# Patient Record
Sex: MALE | Race: WHITE | NOT HISPANIC OR LATINO | Employment: OTHER | URBAN - METROPOLITAN AREA
[De-identification: names, ages, dates, MRNs, and addresses within clinical notes are randomized per-mention and may not be internally consistent; named-entity substitution may affect disease eponyms.]

---

## 2017-12-19 ENCOUNTER — OFFICE VISIT (OUTPATIENT)
Dept: FAMILY MEDICINE | Facility: CLINIC | Age: 27
End: 2017-12-19
Payer: OTHER GOVERNMENT

## 2017-12-19 VITALS
HEART RATE: 70 BPM | WEIGHT: 192.8 LBS | SYSTOLIC BLOOD PRESSURE: 134 MMHG | DIASTOLIC BLOOD PRESSURE: 72 MMHG | OXYGEN SATURATION: 95 % | BODY MASS INDEX: 28.56 KG/M2 | TEMPERATURE: 97.7 F | HEIGHT: 69 IN

## 2017-12-19 DIAGNOSIS — R10.32 LLQ ABDOMINAL PAIN: Primary | ICD-10-CM

## 2017-12-19 DIAGNOSIS — R19.7 DIARRHEA, UNSPECIFIED TYPE: ICD-10-CM

## 2017-12-19 LAB
ALBUMIN SERPL-MCNC: 4.2 G/DL (ref 3.4–5)
ALP SERPL-CCNC: 45 U/L (ref 40–150)
ALT SERPL W P-5'-P-CCNC: 39 U/L (ref 0–70)
ANION GAP SERPL CALCULATED.3IONS-SCNC: 5 MMOL/L (ref 3–14)
AST SERPL W P-5'-P-CCNC: 19 U/L (ref 0–45)
BILIRUB SERPL-MCNC: 0.5 MG/DL (ref 0.2–1.3)
BUN SERPL-MCNC: 27 MG/DL (ref 7–30)
CALCIUM SERPL-MCNC: 9.1 MG/DL (ref 8.5–10.1)
CHLORIDE SERPL-SCNC: 101 MMOL/L (ref 94–109)
CO2 SERPL-SCNC: 30 MMOL/L (ref 20–32)
CREAT SERPL-MCNC: 1.28 MG/DL (ref 0.66–1.25)
ERYTHROCYTE [DISTWIDTH] IN BLOOD BY AUTOMATED COUNT: 12.2 % (ref 10–15)
GFR SERPL CREATININE-BSD FRML MDRD: 67 ML/MIN/1.7M2
GLUCOSE SERPL-MCNC: 106 MG/DL (ref 70–99)
HCT VFR BLD AUTO: 43.7 % (ref 40–53)
HGB BLD-MCNC: 15.2 G/DL (ref 13.3–17.7)
MCH RBC QN AUTO: 31.2 PG (ref 26.5–33)
MCHC RBC AUTO-ENTMCNC: 34.8 G/DL (ref 31.5–36.5)
MCV RBC AUTO: 90 FL (ref 78–100)
PLATELET # BLD AUTO: 180 10E9/L (ref 150–450)
POTASSIUM SERPL-SCNC: 3.8 MMOL/L (ref 3.4–5.3)
PROT SERPL-MCNC: 7.5 G/DL (ref 6.8–8.8)
RBC # BLD AUTO: 4.87 10E12/L (ref 4.4–5.9)
SODIUM SERPL-SCNC: 136 MMOL/L (ref 133–144)
TSH SERPL DL<=0.005 MIU/L-ACNC: 0.69 MU/L (ref 0.4–4)
WBC # BLD AUTO: 4.6 10E9/L (ref 4–11)

## 2017-12-19 PROCEDURE — 83516 IMMUNOASSAY NONANTIBODY: CPT | Mod: 59 | Performed by: INTERNAL MEDICINE

## 2017-12-19 PROCEDURE — 36415 COLL VENOUS BLD VENIPUNCTURE: CPT | Performed by: INTERNAL MEDICINE

## 2017-12-19 PROCEDURE — 83516 IMMUNOASSAY NONANTIBODY: CPT | Performed by: INTERNAL MEDICINE

## 2017-12-19 PROCEDURE — 99203 OFFICE O/P NEW LOW 30 MIN: CPT | Performed by: INTERNAL MEDICINE

## 2017-12-19 PROCEDURE — 80053 COMPREHEN METABOLIC PANEL: CPT | Performed by: INTERNAL MEDICINE

## 2017-12-19 PROCEDURE — 85027 COMPLETE CBC AUTOMATED: CPT | Performed by: INTERNAL MEDICINE

## 2017-12-19 PROCEDURE — 84443 ASSAY THYROID STIM HORMONE: CPT | Performed by: INTERNAL MEDICINE

## 2017-12-19 NOTE — PROGRESS NOTES
SUBJECTIVE:   Darius Bennett is a 27 year old male who presents to clinic today for the following health issues:  Chief Complaint   Patient presents with     Abdominal Pain     x 1 year on/off, worsening over past month, w/ diarrhea      ABDOMINAL   PAIN     Onset: x 1 year, getting worse over past month     Description:   Character: Sharp pain comes/goes and when it happens it hurts to breath.  Feels like it has something to do w/ digestion.  Within 10 minutes of eating will have diarrhea   Location: left lower quadrant  Radiation: None    Intensity: 5/10 when happening     Progression of Symptoms:  worsening and waxing and waning    Accompanying Signs & Symptoms:  Fever/Chills?: no   Gas/Bloating: YES  Nausea: no   Vomitting: no   Diarrhea?: YES- 10 - 12 episodes of diarrhea yesterday often w/in minutes of eating   Constipation:no   Dysuria or Hematuria: YES- patient feels like stream was weak yesterday.     History:   Trauma: no   Previous similar pain: YES- about 2 years ago diagnosed w/ IBS   Previous tests done: blood and stool test    Precipitating factors:   Does the pain change with:     Food: YES     BM: YES    Urination: no     Alleviating factors:  none    Therapies Tried and outcome: none    LMP:  not applicable       Carlos Manuel reports that he was diagnosed with IBS a couple of years ago due to daily diarrhea (typically 2-3 times per day).  He had work-up in the  with blood and stool tests that were normal.  He doesn't recall what they tested for exactly.  He did not have any abdominal imaging or colonoscopy at that time.  He gets some cramping pain with diarrhea, but over the past year he has developed LLQ pain that is different and becoming more constant, especially over the past month.  The LLQ pain is sharp and intermittent.  His diarrhea has increased in frequency- had 10-12 episodes yesterday, very liquid.  No blood or mucus in the stool.  He does not have diarrhea at night.  He's not  "had any fevers, chills, or nausea.  He has had a 7 pound weight gain in the past couple of months because he has been working out.  He has occasional sternal chest pain for about 30 minutes, non radiating.  Some episodes of pounding heart, but not fast.  No SOB.  No nausea.  Had some frequent urination recently.    He has not had an recent travel, new animal exposure, untreated water consumption, recent antibiotics, or exposure to healthcare environments.      Problem list and histories reviewed & adjusted, as indicated.  Additional history: as documented    Patient Active Problem List   Diagnosis     Cluster headaches     IBS (irritable bowel syndrome)     Past Surgical History:   Procedure Laterality Date     Compartment release Bilateral 2007    He reports compartment syndrome in both lower legs for which he had surgery       Social History   Substance Use Topics     Smoking status: Never Smoker     Smokeless tobacco: Former User     Alcohol use Yes     Family History   Problem Relation Age of Onset     DIABETES Paternal Grandmother      Kidney Cancer Paternal Grandfather          Current Outpatient Prescriptions   Medication Sig Dispense Refill     Multiple Vitamins-Minerals (MULTIVITAMIN ADULT PO)        Omega-3 Fatty Acids (FISH OIL OMEGA-3 PO)        No Known Allergies      Reviewed and updated as needed this visit by clinical staffTobacco  Allergies  Med Hx  Surg Hx  Fam Hx  Soc Hx      Reviewed and updated as needed this visit by Provider         ROS:  Constitutional, cardiovascular, pulmonary, gi and gu systems are negative, except as otherwise noted.      OBJECTIVE:   /72 (BP Location: Right arm, Patient Position: Chair, Cuff Size: Adult Regular)  Pulse 70  Temp 97.7  F (36.5  C) (Tympanic)  Ht 5' 8.5\" (1.74 m)  Wt 192 lb 12.8 oz (87.5 kg)  SpO2 95%  BMI 28.89 kg/m2  Body mass index is 28.89 kg/(m^2).    GENERAL: healthy, alert and no distress  RESP: lungs clear to auscultation - no " rales, rhonchi or wheezes  CV: regular rate and rhythm, normal S1 S2, no S3 or S4, no murmur, click or rub  ABDOMEN: soft, mild LLQ tenderness, no rebound or guarding, no hepatosplenomegaly, no masses and bowel sounds normal      Diagnostic Test Results:  Results for orders placed or performed in visit on 12/19/17 (from the past 24 hour(s))   CBC with platelets   Result Value Ref Range    WBC 4.6 4.0 - 11.0 10e9/L    RBC Count 4.87 4.4 - 5.9 10e12/L    Hemoglobin 15.2 13.3 - 17.7 g/dL    Hematocrit 43.7 40.0 - 53.0 %    MCV 90 78 - 100 fl    MCH 31.2 26.5 - 33.0 pg    MCHC 34.8 31.5 - 36.5 g/dL    RDW 12.2 10.0 - 15.0 %    Platelet Count 180 150 - 450 10e9/L   Comprehensive metabolic panel   Result Value Ref Range    Sodium 136 133 - 144 mmol/L    Potassium 3.8 3.4 - 5.3 mmol/L    Chloride 101 94 - 109 mmol/L    Carbon Dioxide 30 20 - 32 mmol/L    Anion Gap 5 3 - 14 mmol/L    Glucose 106 (H) 70 - 99 mg/dL    Urea Nitrogen 27 7 - 30 mg/dL    Creatinine 1.28 (H) 0.66 - 1.25 mg/dL    GFR Estimate 67 >60 mL/min/1.7m2    GFR Estimate If Black 81 >60 mL/min/1.7m2    Calcium 9.1 8.5 - 10.1 mg/dL    Bilirubin Total 0.5 0.2 - 1.3 mg/dL    Albumin 4.2 3.4 - 5.0 g/dL    Protein Total 7.5 6.8 - 8.8 g/dL    Alkaline Phosphatase 45 40 - 150 U/L    ALT 39 0 - 70 U/L    AST 19 0 - 45 U/L   TSH with free T4 reflex   Result Value Ref Range    TSH 0.69 0.40 - 4.00 mU/L       ASSESSMENT/PLAN:       1. LLQ abdominal pain  2. Diarrhea, unspecified type    LLQ pain is a different symptom than his typical IBS symptoms and his BM frequency has significantly increased, so this does warrant further investigation.  Labs fairly unremarkable thus far- creatinine is slightly high (possibly related to high muscle mass?) but GFR is okay.  Will get some stool studies as well.  He does track his diet, advised him to watch to see if symptoms correlate with any particular food.  If remainder of labs are normal, consider abdominal imaging and/or  colonoscopy for further evaluation.     - CBC with platelets  - Comprehensive metabolic panel  - TSH with free T4 reflex  - Tissue transglutaminase anne IgA and IgG  - Fecal Lactoferrin; Future  - Enteric Bacteria and Virus Panel by SUYAPA Stool; Future  - Clostridium difficile toxin B PCR; Future  - Ova and Parasite Exam Routine; Future  - Ova and Parasite Exam Routine; Future    MounaJessie Law MD  [facility]

## 2017-12-19 NOTE — LETTER
December 20, 2017      Darius Bennett  [address] [address] [address]        Dear ,    We are writing to inform you of your test results.  Please let Carlos Manuel know that his blood work looks pretty good.  Normal cell counts, thyroid, Celiac test, electrolytes, liver tests.  Blood sugar was a bit high, likely because he was not fasting, and his creatinine is a bit high- likely due to high muscle mass.  He should submit the stool sample for further evaluation.  Thanks.    Also, it appears the phone number we have on file to reach you is the wrong number. Please call the clinic at [phone] to update your contact information. Thank You     Resulted Orders   CBC with platelets   Result Value Ref Range    WBC 4.6 4.0 - 11.0 10e9/L    RBC Count 4.87 4.4 - 5.9 10e12/L    Hemoglobin 15.2 13.3 - 17.7 g/dL    Hematocrit 43.7 40.0 - 53.0 %    MCV 90 78 - 100 fl    MCH 31.2 26.5 - 33.0 pg    MCHC 34.8 31.5 - 36.5 g/dL    RDW 12.2 10.0 - 15.0 %    Platelet Count 180 150 - 450 10e9/L   Comprehensive metabolic panel   Result Value Ref Range    Sodium 136 133 - 144 mmol/L    Potassium 3.8 3.4 - 5.3 mmol/L    Chloride 101 94 - 109 mmol/L    Carbon Dioxide 30 20 - 32 mmol/L    Anion Gap 5 3 - 14 mmol/L    Glucose 106 (H) 70 - 99 mg/dL    Urea Nitrogen 27 7 - 30 mg/dL    Creatinine 1.28 (H) 0.66 - 1.25 mg/dL    GFR Estimate 67 >60 mL/min/1.7m2      Comment:      Non  GFR Calc    GFR Estimate If Black 81 >60 mL/min/1.7m2      Comment:       GFR Calc    Calcium 9.1 8.5 - 10.1 mg/dL    Bilirubin Total 0.5 0.2 - 1.3 mg/dL    Albumin 4.2 3.4 - 5.0 g/dL    Protein Total 7.5 6.8 - 8.8 g/dL    Alkaline Phosphatase 45 40 - 150 U/L    ALT 39 0 - 70 U/L    AST 19 0 - 45 U/L   TSH with free T4 reflex   Result Value Ref Range    TSH 0.69 0.40 - 4.00 mU/L   Tissue transglutaminase anne IgA and IgG   Result Value Ref Range    Tissue Transglutaminase Antibody IgA 1 <7 U/mL      Comment:       Negative  The tTG-IgA assay has limited utility for patients with decreased levels of   IgA. Screening for celiac disease should include IgA testing to rule out   selective IgA deficiency and to guide selection and interpretation of   serological testing. tTG-IgG testing may be positive in celiac disease   patients with IgA deficiency.      Tissue Transglutaminase Snehal IgG <1 <7 U/mL      Comment:      Negative       If you have any questions or concerns, please call the clinic at the number listed above.       Sincerely,        Jayant Law MD/angélica

## 2017-12-19 NOTE — MR AVS SNAPSHOT
"              After Visit Summary   12/19/2017    Darius Bennett    MRN: [id number]           Patient Information     Date Of Birth          1990        Visit Information        Provider Department      12/19/2017 8:20 AM Jayant Law MD [facility]        Today's Diagnoses     LLQ abdominal pain    -  1    Diarrhea, unspecified type           Follow-ups after your visit        Future tests that were ordered for you today     Open Future Orders        Priority Expected Expires Ordered    Fecal Lactoferrin Routine  12/19/2018 12/19/2017    Enteric Bacteria and Virus Panel by SUYAPA Stool Routine  12/19/2018 12/19/2017    Clostridium difficile toxin B PCR Routine  12/19/2018 12/19/2017    Ova and Parasite Exam Routine Routine  12/19/2018 12/19/2017    Ova and Parasite Exam Routine Routine  12/19/2018 12/19/2017            Who to contact     If you have questions or need follow up information about today's clinic visit or your schedule please contact [facility] directly at [phone].  Normal or non-critical lab and imaging results will be communicated to you by Cloudnexahart, letter or phone within 4 business days after the clinic has received the results. If you do not hear from us within 7 days, please contact the clinic through Jooobz!t or phone. If you have a critical or abnormal lab result, we will notify you by phone as soon as possible.  Submit refill requests through Kaola100 or call your pharmacy and they will forward the refill request to us. Please allow 3 business days for your refill to be completed.          Additional Information About Your Visit        Cloudnexahart Information     Kaola100 lets you send messages to your doctor, view your test results, renew your prescriptions, schedule appointments and more. To sign up, go to [email or web address] . Click on \"Log in\" on the left side of the screen, which will take you to the Welcome page. Then click on \"Sign up Now\" on the " "right side of the page.     You will be asked to enter the access code listed below, as well as some personal information. Please follow the directions to create your username and password.     Your access code is: [email or web address]  Expires: 3/19/2018  9:06 AM     Your access code will  in 90 days. If you need help or a new code, please call your [place] clinic or [phone].        Care EveryWhere ID     This is your Care EveryWhere ID. This could be used by other organizations to access your [place] medical records  [id number]        Your Vitals Were     Pulse Temperature Height Pulse Oximetry BMI (Body Mass Index)       70 97.7  F (36.5  C) (Tympanic) 5' 8.5\" (1.74 m) 95% 28.89 kg/m2        Blood Pressure from Last 3 Encounters:   17 134/72    Weight from Last 3 Encounters:   17 192 lb 12.8 oz (87.5 kg)              We Performed the Following     CBC with platelets     Comprehensive metabolic panel     Tissue transglutaminase anne IgA and IgG     TSH with free T4 reflex          Today's Medication Changes          These changes are accurate as of: 17  9:06 AM.  If you have any questions, ask your nurse or doctor.               Stop taking these medicines if you haven't already. Please contact your care team if you have questions.     CLARINEX 5 MG tablet   Generic drug:  desloratadine   Stopped by:  Jayant Law MD                    Primary Care Provider Office Phone # Fax #    [facility] [phone] [address] [address]        Equal Access to Services     [facility]: Hadii manish silverio haduarorao Sokay, waaxda luqadaha, qaybta kaalmada josh lopez idiin hayaan adeeg kharash la'aan . So [facility] [phone].    ATENCIÓN: Si habla español, tiene a scherer disposición servicios gratuitos de asistencia lingüística. Llame al [phone].    We comply with applicable federal civil rights laws and [place] laws. We do not discriminate on the " basis of race, color, national origin, age, disability, sex, sexual orientation, or gender identity.            Thank you!     Thank you for choosing [facility]  for your care. Our goal is always to provide you with excellent care. Hearing back from our patients is one way we can continue to improve our services. Please take a few minutes to complete the written survey that you may receive in the mail after your visit with us. Thank you!             Your Updated Medication List - Protect others around you: Learn how to safely use, store and throw away your medicines at www.disposemymeds.org.          This list is accurate as of: 12/19/17  9:06 AM.  Always use your most recent med list.                   Brand Name Dispense Instructions for use Diagnosis    FISH OIL OMEGA-3 PO           MULTIVITAMIN ADULT PO

## 2017-12-19 NOTE — NURSING NOTE
"Chief Complaint   Patient presents with     Abdominal Pain     x 1 year on/off, worsening over past month, w/ diarrhea        Initial /72 (BP Location: Right arm, Patient Position: Chair, Cuff Size: Adult Regular)  Pulse 70  Temp 97.7  F (36.5  C) (Tympanic)  Ht 5' 8.5\" (1.74 m)  Wt 192 lb 12.8 oz (87.5 kg)  SpO2 95%  BMI 28.89 kg/m2 Estimated body mass index is 28.89 kg/(m^2) as calculated from the following:    Height as of this encounter: 5' 8.5\" (1.74 m).    Weight as of this encounter: 192 lb 12.8 oz (87.5 kg).  Medication Reconciliation: complete   Krysta ELLSWORTH CMA ([facility])    "

## 2017-12-20 LAB
TTG IGA SER-ACNC: 1 U/ML
TTG IGG SER-ACNC: <1 U/ML

## 2017-12-21 ENCOUNTER — ALLIED HEALTH/NURSE VISIT (OUTPATIENT)
Dept: FAMILY MEDICINE | Facility: CLINIC | Age: 27
End: 2017-12-21
Payer: OTHER GOVERNMENT

## 2017-12-21 ENCOUNTER — TELEPHONE (OUTPATIENT)
Dept: FAMILY MEDICINE | Facility: CLINIC | Age: 27
End: 2017-12-21

## 2017-12-21 DIAGNOSIS — R10.32 LLQ ABDOMINAL PAIN: ICD-10-CM

## 2017-12-21 DIAGNOSIS — R10.32 LLQ ABDOMINAL PAIN: Primary | ICD-10-CM

## 2017-12-21 DIAGNOSIS — R19.7 DIARRHEA, UNSPECIFIED TYPE: ICD-10-CM

## 2017-12-21 LAB
C COLI+JEJUNI+LARI FUSA STL QL NAA+PROBE: NOT DETECTED
C DIFF TOX B STL QL: ABNORMAL
EC STX1 GENE STL QL NAA+PROBE: NOT DETECTED
EC STX2 GENE STL QL NAA+PROBE: NOT DETECTED
ENTERIC PATHOGEN COMMENT: NORMAL
NOROV GI+II ORF1-ORF2 JNC STL QL NAA+PR: NOT DETECTED
RVA NSP5 STL QL NAA+PROBE: NOT DETECTED
SALMONELLA SP RPOD STL QL NAA+PROBE: NOT DETECTED
SHIGELLA SP+EIEC IPAH STL QL NAA+PROBE: NOT DETECTED
SPECIMEN SOURCE: ABNORMAL
V CHOL+PARA RFBL+TRKH+TNAA STL QL NAA+PR: NOT DETECTED
Y ENTERO RECN STL QL NAA+PROBE: NOT DETECTED

## 2017-12-21 PROCEDURE — 87177 OVA AND PARASITES SMEARS: CPT | Performed by: INTERNAL MEDICINE

## 2017-12-21 PROCEDURE — 99207 ZZC NO CHARGE NURSE ONLY: CPT

## 2017-12-21 PROCEDURE — 87209 SMEAR COMPLEX STAIN: CPT | Performed by: INTERNAL MEDICINE

## 2017-12-21 PROCEDURE — 87506 IADNA-DNA/RNA PROBE TQ 6-11: CPT | Performed by: INTERNAL MEDICINE

## 2017-12-21 NOTE — TELEPHONE ENCOUNTER
Pt informed of Karlee Tello' instructions below.    He updates that he is following a bland diet and feels he is improving overall.  He is concerned about the amount of pain that he was experiencing and what to do if it recurs.     Informed pt that he will need to present to the ED if his pain worsens or he has worsening symptoms again.  Instructed to continue to follow bland diet of easy-to-digest foods until all his symptoms have resolved.  Informed to avoid alcohol, caffeine, chocolate, fatty foods, spicy foods and other hard to digest foods.    Directed pt to follow up with Dr Law when he returns from his trip after the new year.      Routed this message to Dr Law for further instructions upon her return to clinic next week.    Post-poned this message until then.    Carlota Waldron RN

## 2017-12-21 NOTE — LETTER
December 22, 2017      Darius Bennett  [address] [address] [address]        Dear ,    We are writing to inform you of your test results.    Stool studies negative for infection    Resulted Orders   Enteric Bacteria and Virus Panel by SUYAPA Stool   Result Value Ref Range    Campylobacter group by SUYAPA Not Detected NDET^Not Detected    Salmonella species by SUYAPA Not Detected NDET^Not Detected    Shigella species by SUYAPA Not Detected NDET^Not Detected    Vibrio group by SUYAPA Not Detected NDET^Not Detected    Rotavirus A by SUYAPA Not Detected NDET^Not Detected    Shiga toxin 1 gene by SUYAPA Not Detected NDET^Not Detected    Shiga toxin 2 gene by SUYAPA Not Detected NDET^Not Detected    Norovirus I and II by SUYAPA Not Detected NDET^Not Detected    Yersinia enterocolitica by SUYAPA Not Detected NDET^Not Detected    Enteric pathogen comment       Testing performed by multiplexed, qualitative PCR using the Nanosphere ForeUpigene Enteric   Pathogens Nucleic Acid Test. Results should not be used as the sole basis for diagnosis,   treatment, or other patient management decisions.        Comment:      Positive results do not rule out co-infection with other organisms that are   not detected by this test, and may not be the sole or definitive cause of   patient illness.   Negative results in the setting of clinical illness compatible with   gastroenteritis may be due to infection by pathogens that are not detected by   this test or non-infectious causes such as ulcerative colitis, irritable bowel   syndrome, or Crohn's disease.   Note: Shiga toxin producing E. coli (STEC) typically harbor one or both genes   that encode for Shiga toxins 1 and 2.         If you have any questions or concerns, please call the clinic at the number listed above.       Sincerely,        Charlene Sawyer, CNP

## 2017-12-21 NOTE — TELEPHONE ENCOUNTER
The pt has stopped into a RN appt to discuss symptoms. The pt was seen in clinic visit on 12/19/17 for c/o LLQ abd pain. I have reviewed lab results with him. The pt reports that he dropped off stool samples today. He is in a RN visit to report continued LLQ pain that he rates 5/10. He reports eating a bland diet, and states that the diarrhea has gotten better, but the abd pain as remained the same. He states that it is very localized and feels different then his previous experience with IBS. He is concerned about this. The pt reports that he is leaving for [place] tomorrow morning.     I will route this message to Dr Law to review and advise.   Meron Jesus RN

## 2017-12-21 NOTE — MR AVS SNAPSHOT
"              After Visit Summary   2017    Darius Bennett    MRN: [id number]           Patient Information     Date Of Birth          1990        Visit Information        Provider Department      2017 10:45 AM GERSON CERNA/JOHNY MARTINS [facility]        Today's Diagnoses     LLQ abdominal pain    -  1       Follow-ups after your visit        Who to contact     If you have questions or need follow up information about today's clinic visit or your schedule please contact [facility] directly at [phone].  Normal or non-critical lab and imaging results will be communicated to you by Gridle.inhart, letter or phone within 4 business days after the clinic has received the results. If you do not hear from us within 7 days, please contact the clinic through Gridle.inhart or phone. If you have a critical or abnormal lab result, we will notify you by phone as soon as possible.  Submit refill requests through AMDL or call your pharmacy and they will forward the refill request to us. Please allow 3 business days for your refill to be completed.          Additional Information About Your Visit        MyChart Information     AMDL lets you send messages to your doctor, view your test results, renew your prescriptions, schedule appointments and more. To sign up, go to [email or web address] . Click on \"Log in\" on the left side of the screen, which will take you to the Welcome page. Then click on \"Sign up Now\" on the right side of the page.     You will be asked to enter the access code listed below, as well as some personal information. Please follow the directions to create your username and password.     Your access code is: [email or web address]  Expires: 3/19/2018  9:06 AM     Your access code will  in 90 days. If you need help or a new code, please call your [facility] or [phone].        Care EveryWhere ID     This is your Care EveryWhere ID. This could be used by other " organizations to access your [place] medical records  [id number]         Blood Pressure from Last 3 Encounters:   12/19/17 134/72    Weight from Last 3 Encounters:   12/19/17 192 lb 12.8 oz (87.5 kg)              Today, you had the following     No orders found for display       Primary Care Provider Office Phone # Fax #    [facility] [phone] [address] [address]        Equal Access to Services     SHAYNE ZAMORA : Hadii aad ku hadasho Soomaali, waaxda luqadaha, qaybta kaalmada adeegyada, waxay idiin hayaan adeeg kharash la'christophern amber. So [facility] [phone].    ATENCIÓN: Si habla español, tiene a scherer disposición servicios gratuitos de asistencia lingüística. Ll[facility] [phone].    We comply with applicable federal civil rights laws and [place] laws. We do not discriminate on the basis of race, color, national origin, age, disability, sex, sexual orientation, or gender identity.            Thank you!     Thank you for choosing [facility]  for your care. Our goal is always to provide you with excellent care. Hearing back from our patients is one way we can continue to improve our services. Please take a few minutes to complete the written survey that you may receive in the mail after your visit with us. Thank you!             Your Updated Medication List - Protect others around you: Learn how to safely use, store and throw away your medicines at www.disposemymeds.org.          This list is accurate as of: 12/21/17 11:43 AM.  Always use your most recent med list.                   Brand Name Dispense Instructions for use Diagnosis    FISH OIL OMEGA-3 PO           MULTIVITAMIN ADULT PO

## 2017-12-21 NOTE — TELEPHONE ENCOUNTER
I would speak to Dr. Dao... If in clinic today.  IF not patient needs to be seen for recheck and ? Imaging.  LOAN JenkinsP

## 2017-12-21 NOTE — TELEPHONE ENCOUNTER
Reason for Call:  Other fyi    Detailed comments: lab is calling to say they cannot process the stool sample for this patient as it is in a solid form.    Phone Number labcan be reached at: Other phone number:  [phone]    Best Time: any    Can we leave a detailed message on this number? YES    Call taken on 12/21/2017 at 1:50 PM by Isi Cates

## 2017-12-21 NOTE — NURSING NOTE
The pt has stopped into a RN appt to discuss symptoms. The pt was seen in clinic visit on 12/19/17 for c/o LLQ abd pain. I have reviewed labs with him. The pt reports that he dropped off stool samples today. He is in a RN visit to report continued LLQ pain that he rates 5/10. He reports eating a bland diet, and states that the diarrhea has gotten better, but the abd pain as remained the same. He states that it is very localized and feels different then his previous experience with IBS. He is concerned about this. The pt reports that he is leaving for [place] tomorrow morning.     I will route this message to Dr Law to review and advise.   Meron Jesus RN

## 2017-12-22 LAB
O+P STL MICRO: NORMAL
O+P STL MICRO: NORMAL
SPECIMEN SOURCE: NORMAL

## 2017-12-22 NOTE — TELEPHONE ENCOUNTER
Pt informed that his stool specimen was too formed to be processed.  Pt says that his diarrhea is improving.  No further specimen re-ordered at this time due to formed stools.  Carlota Waldron RN

## 2017-12-27 NOTE — TELEPHONE ENCOUNTER
The pt has returned a call to the clinic. He states that he still has the pain as described below. I have provided him with the number to schedule the CT scan. We can just leave him a message when the order is placed.   I will route this back to Dr Law. The pt would like to move forward with the CT scan. Please order.   Thank you,    Meron Jesus RN

## 2017-12-27 NOTE — TELEPHONE ENCOUNTER
If not improved by the time he returns from his trip, recommend proceeding with abdominal CT scan.  I will place order if he is not better.  Thanks.    Jayant Law MD

## 2018-01-04 RX ORDER — IOPAMIDOL 755 MG/ML
94 INJECTION, SOLUTION INTRAVASCULAR ONCE
Status: COMPLETED | OUTPATIENT
Start: 2018-01-05 | End: 2018-01-05

## 2018-01-05 ENCOUNTER — HOSPITAL ENCOUNTER (OUTPATIENT)
Dept: CT IMAGING | Facility: CLINIC | Age: 28
Discharge: HOME | End: 2018-01-05
Attending: INTERNAL MEDICINE | Admitting: INTERNAL MEDICINE
Payer: OTHER GOVERNMENT

## 2018-01-05 DIAGNOSIS — R10.32 LLQ ABDOMINAL PAIN: ICD-10-CM

## 2018-01-05 DIAGNOSIS — R19.7 DIARRHEA, UNSPECIFIED TYPE: ICD-10-CM

## 2018-01-05 DIAGNOSIS — R10.32 LLQ ABDOMINAL PAIN: Primary | ICD-10-CM

## 2018-01-05 PROCEDURE — 25000125 ZZHC RX 250: Performed by: RADIOLOGY

## 2018-01-05 PROCEDURE — 74177 CT ABD & PELVIS W/CONTRAST: CPT

## 2018-01-05 PROCEDURE — 25000128 H RX IP 250 OP 636: Performed by: RADIOLOGY

## 2018-01-05 RX ADMIN — IOPAMIDOL 94 ML: 755 INJECTION, SOLUTION INTRAVENOUS at 10:24

## 2018-01-05 RX ADMIN — SODIUM CHLORIDE 64 ML: 9 INJECTION, SOLUTION INTRAVENOUS at 10:24

## 2018-03-05 ENCOUNTER — OFFICE VISIT (OUTPATIENT)
Dept: FAMILY MEDICINE | Facility: CLINIC | Age: 28
End: 2018-03-05
Payer: OTHER GOVERNMENT

## 2018-03-05 VITALS
DIASTOLIC BLOOD PRESSURE: 74 MMHG | WEIGHT: 198 LBS | HEART RATE: 65 BPM | BODY MASS INDEX: 29.67 KG/M2 | SYSTOLIC BLOOD PRESSURE: 130 MMHG

## 2018-03-05 DIAGNOSIS — G89.29 CHRONIC PAIN OF LEFT KNEE: Primary | ICD-10-CM

## 2018-03-05 DIAGNOSIS — M25.562 CHRONIC PAIN OF LEFT KNEE: Primary | ICD-10-CM

## 2018-03-05 PROCEDURE — 99213 OFFICE O/P EST LOW 20 MIN: CPT | Performed by: NURSE PRACTITIONER

## 2018-03-05 NOTE — MR AVS SNAPSHOT
After Visit Summary   3/5/2018    Darius Bennett    MRN: [id number]           Patient Information     Date Of Birth          1990        Visit Information        Provider Department      3/5/2018 11:20 AM Nelly Aguirre APRN CNP [facility]        Today's Diagnoses     Chronic pain of left knee    -  1      Care Instructions    Knee MRI  Ibuprofen, Naproxen as needed  Try soft knee brace, or ACE wraps    Schedule with ortho               Follow-ups after your visit        Additional Services     ORTHO  REFERRAL       [facility] is referring you to the Orthopedic  Services at [facility].       The  Representative will assist you in the coordination of your Orthopedic and Musculoskeletal Care as prescribed by your physician.    The  Representative will call you within 1 business day to help schedule your appointment, or you may contact the  Representative at:    All areas ~ [phone]     Type of Referral : Non Surgical       Timeframe requested: 3 - 5 days    Coverage of these services is subject to the terms and limitations of your health insurance plan.  Please call member services at your health plan with any benefit or coverage questions.      If X-rays, CT or MRI's have been performed, please contact the facility where they were done to arrange for , prior to your scheduled appointment.  Please bring this referral request to your appointment and present it to your specialist.                  Follow-up notes from your care team     Return if symptoms worsen or fail to improve.      Your next 10 appointments already scheduled     Mar 09, 2018  8:00 AM CST   (Arrive by 7:45 AM)   MR KNEE LEFT W/O CONTRAST with WYMR2   [facility] MRI ([facility])    [address] [address]   [phone]           Take your medicines as usual, unless  your doctor tells you not to. Bring a list of your current medicines to your exam (including vitamins, minerals and over-the-counter drugs). Also bring the results of similar scans you may have had.  Please remove any body piercings and hair extensions before you arrive.  Follow your doctor s orders. If you do not, we may have to postpone your exam.  You may or may not receive IV contrast for this exam pending the discretion of the Radiologist.  You do not need to do anything special to prepare.  The MRI machine uses a strong magnet. Please wear clothes without metal (snaps, zippers). A sweatsuit works well, or we may give you a hospital gown.   **IMPORTANT** THE INSTRUCTIONS BELOW ARE ONLY FOR THOSE PATIENTS WHO HAVE BEEN PRESCRIBED SEDATION OR GENERAL ANESTHESIA DURING THEIR MRI PROCEDURE:  IF YOUR DOCTOR PRESCRIBED ORAL SEDATION (take medicine to help you relax during your exam):   You must get the medicine from your doctor (oral medication) before you arrive. Bring the medicine to the exam. Do not take it at home. You ll be told when to take it upon arriving for your exam.   Arrive one hour early. Bring someone who can take you home after the test. Your medicine will make you sleepy. After the exam, you may not drive, take a bus or take a taxi by yourself.  IF YOUR DOCTOR PRESCRIBED IV SEDATION:   Arrive one hour early. Bring someone who can take you home after the test. Your medicine will make you sleepy. After the exam, you may not drive, take a bus or take a taxi by yourself.   No eating 6 hours before your exam. You may have clear liquids up until 4 hours before your exam. (Clear liquids include water, clear tea, black coffee and fruit juice without pulp.)  IF YOUR DOCTOR PRESCRIBED ANESTHESIA (be asleep for your exam):   Arrive 1 1/2 hours early. Bring someone who can take you home after the test. You may not drive, take a bus or take a taxi by yourself.   No eating 8 hours before your exam. You may have  "clear liquids up until 4 hours before your exam. (Clear liquids include water, clear tea, black coffee and fruit juice without pulp.)   You will spend four to five hours in the recovery room.  Please call the Imaging Department at your exam site with any questions.            Mar 15, 2018  3:00 PM CDT   New Visit with Dutch Cheema DO   [facility] ([facility])    [address]  [address]  [facility] [place] [facility] [place] [address] [phone]              Who to contact     If you have questions or need follow up information about today's clinic visit or your schedule please contact [facility] directly at [phone].  Normal or non-critical lab and imaging results will be communicated to you by [employer], letter or phone within 4 business days after the clinic has received the results. If you do not hear from us within 7 days, please contact the clinic through [employer] or phone. If you have a critical or abnormal lab result, we will notify you by phone as soon as possible.  Submit refill requests through [employer] or call your pharmacy and they will forward the refill request to us. Please allow 3 business days for your refill to be completed.          Additional Information About Your Visit        [employer] Information     [employer] lets you send messages to your doctor, view your test results, renew your prescriptions, schedule appointments and more. To sign up, go to [email or web address] . Click on \"Log in\" on the left side of the screen, which will take you to the Welcome page. Then click on \"Sign up Now\" on the right side of the page.     You will be asked to enter the access code listed below, as well as some personal information. Please follow the directions to create your username and password.     Your access code is: [email or web address]  Expires: 3/19/2018  9:06 AM     Your access code will  in 90 days. If you need help or a new code, please call your " [facility] or [phone].        Care EveryWhere ID     This is your Care EveryWhere ID. This could be used by other organizations to access your [facility] medical records  [id number]        Your Vitals Were     Pulse BMI (Body Mass Index)                65 29.67 kg/m2           Blood Pressure from Last 3 Encounters:   03/05/18 130/74   12/19/17 134/72    Weight from Last 3 Encounters:   03/05/18 198 lb (89.8 kg)   12/19/17 192 lb 12.8 oz (87.5 kg)              We Performed the Following     ORTHO  REFERRAL        Primary Care Provider Office Phone # Fax #    [facility] [phone] [address] [address]        Equal Access to Services     SHAYNE ZAMORA : Hadii manish yano Sokay, waaxda luqadaha, qaybta kaalmada adeegyada, josh del rosario . So [facility] [phone].    ATENCIÓN: Si habla español, tiene a scherer disposición servicios gratuitos de asistencia lingüística. Llame al [phone].    We comply with applicable federal civil rights laws and [place] laws. We do not discriminate on the basis of race, color, national origin, age, disability, sex, sexual orientation, or gender identity.            Thank you!     Thank you for choosing [facility]  for your care. Our goal is always to provide you with excellent care. Hearing back from our patients is one way we can continue to improve our services. Please take a few minutes to complete the written survey that you may receive in the mail after your visit with us. Thank you!             Your Updated Medication List - Protect others around you: Learn how to safely use, store and throw away your medicines at www.disposemymeds.org.          This list is accurate as of 3/5/18 11:59 PM.  Always use your most recent med list.                   Brand Name Dispense Instructions for use Diagnosis    FISH OIL OMEGA-3 PO           MULTIVITAMIN ADULT PO

## 2018-03-05 NOTE — PATIENT INSTRUCTIONS
Knee MRI  Ibuprofen, Naproxen as needed  Try soft knee brace, or ACE wraps    Schedule with ortho

## 2018-03-05 NOTE — NURSING NOTE
"Chief Complaint   Patient presents with     Referral     ongoing left knee pain x2 years. Is in the , has pain while running. No history of imaging. Has gone to PT twice with little to no relief       Initial /74 (BP Location: Right arm, Patient Position: Chair, Cuff Size: Adult Regular)  Pulse 65  Wt 198 lb (89.8 kg)  BMI 29.67 kg/m2 Estimated body mass index is 29.67 kg/(m^2) as calculated from the following:    Height as of 12/19/17: 5' 8.5\" (1.74 m).    Weight as of this encounter: 198 lb (89.8 kg).  Medication Reconciliation: complete   Priyanka Taylor CMA    "

## 2018-03-05 NOTE — PROGRESS NOTES
SUBJECTIVE:   Darius Bennett is a 28 year old male who presents to clinic today for the following health issues: complains of chronic lateral side left knee pain, the pain usually worse after running, sometimes gets very severe pain. He did not notice any swelling and erythema. The pain is mainly on the lateral side radiating up and above his knee. He did physical therapy in the past without any improvement. He is in  and running frequently, was unable to run due to severe pain past few weeks.     MRI and Ortho referral orders faxed to insurance.   Priyanka Taylor [facility]    Problem list and histories reviewed & adjusted, as indicated.  Additional history: as documented    Labs reviewed in EPIC    Reviewed and updated as needed this visit by clinical staff  Tobacco  Allergies  Med Hx  Surg Hx  Fam Hx  Soc Hx      Reviewed and updated as needed this visit by Provider         ROS:  Constitutional, HEENT, cardiovascular, pulmonary, gi and gu systems are negative, except as otherwise noted.    OBJECTIVE:     /74 (BP Location: Right arm, Patient Position: Chair, Cuff Size: Adult Regular)  Pulse 65  Wt 198 lb (89.8 kg)  BMI 29.67 kg/m2  Body mass index is 29.67 kg/(m^2).  GENERAL: healthy, alert and no distress  MS: no gross musculoskeletal defects noted, no edema. Left knee: The is no swelling and ecchymosis.  No rashes, bruising, redness, foreign bodies and scars.   Valgus stress test is negative. Varus stress test is negative. Lateral side of the left knee tender to palpation   SKIN: no suspicious lesions or rashes  PSYCH: mentation appears normal, affect normal/bright    ASSESSMENT/PLAN:     1. Chronic pain of left knee  - MR Knee Left w/o Contrast; Future  - ORTHO  REFERRAL  -avoid running until see ortho  -Ibuprofen, or Naproxen as needed   -ice packs as needed     See Patient Instructions    JEN Villarreal [facility]

## 2018-03-09 ENCOUNTER — HOSPITAL ENCOUNTER (OUTPATIENT)
Dept: MRI IMAGING | Facility: CLINIC | Age: 28
Discharge: HOME | End: 2018-03-09
Attending: NURSE PRACTITIONER | Admitting: NURSE PRACTITIONER
Payer: OTHER GOVERNMENT

## 2018-03-09 ENCOUNTER — TELEPHONE (OUTPATIENT)
Dept: FAMILY MEDICINE | Facility: CLINIC | Age: 28
End: 2018-03-09

## 2018-03-09 ENCOUNTER — OFFICE VISIT (OUTPATIENT)
Dept: FAMILY MEDICINE | Facility: CLINIC | Age: 28
End: 2018-03-09
Payer: OTHER GOVERNMENT

## 2018-03-09 VITALS
OXYGEN SATURATION: 96 % | HEART RATE: 76 BPM | WEIGHT: 195 LBS | DIASTOLIC BLOOD PRESSURE: 63 MMHG | SYSTOLIC BLOOD PRESSURE: 115 MMHG | TEMPERATURE: 96.2 F | BODY MASS INDEX: 28.88 KG/M2 | HEIGHT: 69 IN

## 2018-03-09 DIAGNOSIS — R09.82 POST-NASAL DRIP: Primary | ICD-10-CM

## 2018-03-09 DIAGNOSIS — G89.29 CHRONIC PAIN OF LEFT KNEE: ICD-10-CM

## 2018-03-09 DIAGNOSIS — M25.562 CHRONIC PAIN OF LEFT KNEE: ICD-10-CM

## 2018-03-09 PROCEDURE — 99213 OFFICE O/P EST LOW 20 MIN: CPT | Performed by: FAMILY MEDICINE

## 2018-03-09 PROCEDURE — 73721 MRI JNT OF LWR EXTRE W/O DYE: CPT | Mod: LT

## 2018-03-09 RX ORDER — CETIRIZINE HYDROCHLORIDE 10 MG/1
10 TABLET ORAL EVERY EVENING
Qty: 90 TABLET | Refills: 3 | Status: SHIPPED | OUTPATIENT
Start: 2018-03-09 | End: 2019-05-03

## 2018-03-09 RX ORDER — TRIAMCINOLONE ACETONIDE 55 UG/1
2 SPRAY, METERED NASAL DAILY
Qty: 1 BOTTLE | Refills: 3 | Status: SHIPPED | OUTPATIENT
Start: 2018-03-09 | End: 2019-05-03

## 2018-03-09 NOTE — NURSING NOTE
"Chief Complaint   Patient presents with     Sinus Problem       Initial /63 (BP Location: Left arm, Cuff Size: Adult Regular)  Pulse 76  Temp 96.2  F (35.7  C) (Tympanic)  Ht 5' 8.5\" (1.74 m)  Wt 195 lb (88.5 kg)  SpO2 96%  BMI 29.22 kg/m2 Estimated body mass index is 29.22 kg/(m^2) as calculated from the following:    Height as of this encounter: 5' 8.5\" (1.74 m).    Weight as of this encounter: 195 lb (88.5 kg).  Medication Reconciliation: complete  "

## 2018-03-09 NOTE — MR AVS SNAPSHOT
After Visit Summary   3/9/2018    Darius Bennett    MRN: [id number]           Patient Information     Date Of Birth          1990        Visit Information        Provider Department      3/9/2018 7:00 AM Laquita Peres MD [facility]        Today's Diagnoses     Post-nasal drip    -  1      Care Instructions          Thank you for choosing [facility].  You may be receiving a survey in the mail from [employer] regarding your visit today.  Please take a few minutes to complete and return the survey to let us know how we are doing.      If you have questions or concerns, please contact us via [employer] or you can contact your care team at [phone].    Our Clinic hours are:  Monday 6:40 am  to 7:00 pm  Tuesday -Friday 6:40 am to 5:00 pm    The [place] outpatient lab hours are:  Monday - Friday 6:10 am to 4:45 pm  Saturdays 7:00 am to 11:00 am  Appointments are required, call [phone]    If you have clinical questions after hours or would like to schedule an appointment,  call the clinic at [phone].          Follow-ups after your visit        Your next 10 appointments already scheduled     Mar 09, 2018  8:00 AM CST   (Arrive by 7:45 AM)   MR KNEE LEFT W/O CONTRAST with [address] MRI ([facility])    [address] [id number] [phone]           Take your medicines as usual, unless your doctor tells you not to. Bring a list of your current medicines to your exam (including vitamins, minerals and over-the-counter drugs). Also bring the results of similar scans you may have had.  Please remove any body piercings and hair extensions before you arrive.  Follow your doctor s orders. If you do not, we may have to postpone your exam.  You may or may not receive IV contrast for this exam pending the discretion of the Radiologist.  You do not need to do anything special to prepare.  The MRI machine uses a strong  magnet. Please wear clothes without metal (snaps, zippers). A sweatsuit works well, or we may give you a hospital gown.   **IMPORTANT** THE INSTRUCTIONS BELOW ARE ONLY FOR THOSE PATIENTS WHO HAVE BEEN PRESCRIBED SEDATION OR GENERAL ANESTHESIA DURING THEIR MRI PROCEDURE:  IF YOUR DOCTOR PRESCRIBED ORAL SEDATION (take medicine to help you relax during your exam):   You must get the medicine from your doctor (oral medication) before you arrive. Bring the medicine to the exam. Do not take it at home. You ll be told when to take it upon arriving for your exam.   Arrive one hour early. Bring someone who can take you home after the test. Your medicine will make you sleepy. After the exam, you may not drive, take a bus or take a taxi by yourself.  IF YOUR DOCTOR PRESCRIBED IV SEDATION:   Arrive one hour early. Bring someone who can take you home after the test. Your medicine will make you sleepy. After the exam, you may not drive, take a bus or take a taxi by yourself.   No eating 6 hours before your exam. You may have clear liquids up until 4 hours before your exam. (Clear liquids include water, clear tea, black coffee and fruit juice without pulp.)  IF YOUR DOCTOR PRESCRIBED ANESTHESIA (be asleep for your exam):   Arrive 1 1/2 hours early. Bring someone who can take you home after the test. You may not drive, take a bus or take a taxi by yourself.   No eating 8 hours before your exam. You may have clear liquids up until 4 hours before your exam. (Clear liquids include water, clear tea, black coffee and fruit juice without pulp.)   You will spend four to five hours in the recovery room.  Please call the Imaging Department at your exam site with any questions.            Mar 15, 2018  3:00 PM CDT   New Visit with Dutch Cheema,    [facility] [place] ([facility])    [address] [address] [phone]              Who to contact     If you have  "questions or need follow up information about today's clinic visit or your schedule please contact [facility] directly at [phone].  Normal or non-critical lab and imaging results will be communicated to you by MyChart, letter or phone within 4 business days after the clinic has received the results. If you do not hear from us within 7 days, please contact the clinic through [employer] or phone. If you have a critical or abnormal lab result, we will notify you by phone as soon as possible.  Submit refill requests through [employer] or call your pharmacy and they will forward the refill request to us. Please allow 3 business days for your refill to be completed.          Additional Information About Your Visit        [employer] Information     [employer] lets you send messages to your doctor, view your test results, renew your prescriptions, schedule appointments and more. To sign up, go to [email or web address] . Click on \"Log in\" on the left side of the screen, which will take you to the Welcome page. Then click on \"Sign up Now\" on the right side of the page.     You will be asked to enter the access code listed below, as well as some personal information. Please follow the directions to create your username and password.     Your access code is: [email or web address]  Expires: 3/19/2018  9:06 AM     Your access code will  in 90 days. If you need help or a new code, please call your [facility] clinic or [phone].        Care EveryWhere ID     This is your Care EveryWhere ID. This could be used by other organizations to access your [facility] medical records  [id number]        Your Vitals Were     Pulse Temperature Height Pulse Oximetry BMI (Body Mass Index)       76 96.2  F (35.7  C) (Tympanic) 5' 8.5\" (1.74 m) 96% 29.22 kg/m2        Blood Pressure from Last 3 Encounters:   18 115/63   18 130/74   17 134/72    Weight from Last 3 Encounters:   18 195 lb (88.5 kg)   18 198 lb " (89.8 kg)   12/19/17 192 lb 12.8 oz (87.5 kg)              Today, you had the following     No orders found for display         Today's Medication Changes          These changes are accurate as of 3/9/18  7:42 AM.  If you have any questions, ask your nurse or doctor.               Start taking these medicines.        Dose/Directions    cetirizine 10 MG tablet   Commonly known as:  zyrTEC   Used for:  Post-nasal drip   Started by:  Laquita Peres MD        Dose:  10 mg   Take 1 tablet (10 mg) by mouth every evening   Quantity:  90 tablet   Refills:  3       triamcinolone 55 MCG/ACT Inhaler   Commonly known as:  NASACORT AQ   Used for:  Post-nasal drip   Started by:  Laquita Peres MD        Dose:  2 spray   Spray 2 sprays into both nostrils daily   Quantity:  1 Bottle   Refills:  3            Where to get your medicines      These medications were sent to [facility] [address] [address]     Phone:  [phone]     cetirizine 10 MG tablet    triamcinolone 55 MCG/ACT Inhaler                Primary Care Provider Office Phone # Fax #    [facility] [phone] [address] [address]        Equal Access to Services     SHAYNE ZAMORA AH: Hadii manish ku hadasho Solindaali, waaxda luqadaha, qaybta kaalmada adeegyada, waxay scar hayadilia clark. So [facility] [phone].    ATENCIÓN: Si habla español, tiene a scherer disposición servicios gratuitos de asistencia lingüística. Llame al [phone].    We comply with applicable federal civil rights laws and [place] laws. We do not discriminate on the basis of race, color, national origin, age, disability, sex, sexual orientation, or gender identity.            Thank you!     Thank you for choosing [facility]  for your care. Our goal is always to provide you with excellent care. Hearing back from our patients  is one way we can continue to improve our services. Please take a few minutes to complete the written survey that you may receive in the mail after your visit with us. Thank you!             Your Updated Medication List - Protect others around you: Learn how to safely use, store and throw away your medicines at www.disposemymeds.org.          This list is accurate as of 3/9/18  7:42 AM.  Always use your most recent med list.                   Brand Name Dispense Instructions for use Diagnosis    cetirizine 10 MG tablet    zyrTEC    90 tablet    Take 1 tablet (10 mg) by mouth every evening    Post-nasal drip       FISH OIL OMEGA-3 PO           MULTIVITAMIN ADULT PO           triamcinolone 55 MCG/ACT Inhaler    NASACORT AQ    1 Bottle    Spray 2 sprays into both nostrils daily    Post-nasal drip

## 2018-03-09 NOTE — PATIENT INSTRUCTIONS
Thank you for choosing [facility].  You may be receiving a survey in the mail from [employer] regarding your visit today.  Please take a few minutes to complete and return the survey to let us know how we are doing.      If you have questions or concerns, please contact us via [employer] or you can contact your care team at [phone].    Our Clinic hours are:  Monday 6:40 am  to 7:00 pm  Tuesday -Friday 6:40 am to 5:00 pm    The [place] outpatient lab hours are:  Monday - Friday 6:10 am to 4:45 pm  Saturdays 7:00 am to 11:00 am  Appointments are required, call [phone]    If you have clinical questions after hours or would like to schedule an appointment,  call the clinic at [phone].

## 2018-03-09 NOTE — PROGRESS NOTES
SUBJECTIVE:   Darius Bennett is a 28 year old male who presents to clinic today for the following health issues:      Acute Illness   Acute illness concerns: sinus drainage   Onset: 2 weeks ago     Fever: no    Chills/Sweats: no    Headache (location?): no    Sinus Pressure:no    Conjunctivitis:  no    Ear Pain: no    Rhinorrhea: no     Congestion: YES- slight     Sore Throat: YES- from drainage      Cough: YES-non-productive    Wheeze: no    Decreased Appetite: no    Nausea: no    Vomiting: no    Diarrhea:  no    Dysuria/Freq.: no    Fatigue/Achiness: no    Sick/Strep Exposure: no     Therapies Tried and outcome: none       Patient comes in for post nasal drainage , ongoing for about three weeks. Patient says he feels the need to always clear his throat. He denies any cough or shortness of breath. This symptom seems worse with exercise. Denies any history of acid reflux. Sometimes eating tends to worsen this symptom.    Problem list and histories reviewed & adjusted, as indicated.  Additional history: as documented    Patient Active Problem List   Diagnosis     Cluster headaches     IBS (irritable bowel syndrome)     Past Surgical History:   Procedure Laterality Date     Compartment release Bilateral 2007    He reports compartment syndrome in both lower legs for which he had surgery       Social History   Substance Use Topics     Smoking status: Never Smoker     Smokeless tobacco: Former User     Alcohol use Yes     Family History   Problem Relation Age of Onset     DIABETES Paternal Grandmother      Kidney Cancer Paternal Grandfather          Current Outpatient Prescriptions   Medication Sig Dispense Refill     triamcinolone (NASACORT AQ) 55 MCG/ACT Inhaler Spray 2 sprays into both nostrils daily 1 Bottle 3     cetirizine (ZYRTEC) 10 MG tablet Take 1 tablet (10 mg) by mouth every evening 90 tablet 3     Multiple Vitamins-Minerals (MULTIVITAMIN ADULT PO)        Omega-3 Fatty Acids (FISH OIL OMEGA-3 PO)     "    No Known Allergies  BP Readings from Last 3 Encounters:   03/09/18 115/63   03/05/18 130/74   12/19/17 134/72    Wt Readings from Last 3 Encounters:   03/09/18 195 lb (88.5 kg)   03/05/18 198 lb (89.8 kg)   12/19/17 192 lb 12.8 oz (87.5 kg)                  Labs reviewed in EPIC    Reviewed and updated as needed this visit by clinical staff  Tobacco  Allergies  Med Hx  Surg Hx  Fam Hx  Soc Hx      Reviewed and updated as needed this visit by Provider         ROS:  Constitutional, HEENT, cardiovascular, pulmonary, gi and gu systems are negative, except as otherwise noted.    OBJECTIVE:     /63 (BP Location: Left arm, Cuff Size: Adult Regular)  Pulse 76  Temp 96.2  F (35.7  C) (Tympanic)  Ht 5' 8.5\" (1.74 m)  Wt 195 lb (88.5 kg)  SpO2 96%  BMI 29.22 kg/m2  Body mass index is 29.22 kg/(m^2).  GENERAL: healthy, alert and no distress  EYES: Eyes grossly normal to inspection, PERRL and conjunctivae and sclerae normal  HENT: ear canals and TM's normal, nose and mouth without ulcers or lesions  NECK: no adenopathy, no asymmetry, masses, or scars and thyroid normal to palpation  RESP: lungs clear to auscultation - no rales, rhonchi or wheezes  CV: regular rate and rhythm, normal S1 S2, no S3 or S4, no murmur, click or rub, no peripheral edema and peripheral pulses strong  MS: no gross musculoskeletal defects noted, no edema    Diagnostic Test Results:  none     ASSESSMENT/PLAN:         (R09.82) Post-nasal drip  (primary encounter diagnosis)  Comment: Trial of allergy medication with nasal spray   Plan: triamcinolone (NASACORT AQ) 55 MCG/ACT Inhaler,        cetirizine (ZYRTEC) 10 MG tablet      FUTURE APPOINTMENTS:       - Follow-up visit as needed.    Laquita Peres MD  [facility]  "

## 2018-03-09 NOTE — TELEPHONE ENCOUNTER
Triamcinolone is not covered by patients insurance but fluticasone is covered- can we please get this changed and get a new prescription sent over to us.    Thank You!  Lauren James  [facility]  P: [phone] F:[phone]

## 2018-03-11 RX ORDER — FLUTICASONE PROPIONATE 50 MCG
1-2 SPRAY, SUSPENSION (ML) NASAL DAILY
Qty: 1 BOTTLE | Refills: 11 | Status: SHIPPED | OUTPATIENT
Start: 2018-03-11 | End: 2019-05-03

## 2018-03-15 ENCOUNTER — OFFICE VISIT (OUTPATIENT)
Dept: ORTHOPEDICS | Facility: CLINIC | Age: 28
End: 2018-03-15
Payer: OTHER GOVERNMENT

## 2018-03-15 DIAGNOSIS — M25.562 LEFT KNEE PAIN, UNSPECIFIED CHRONICITY: Primary | ICD-10-CM

## 2018-03-15 DIAGNOSIS — M25.562 PATELLOFEMORAL ARTHRALGIA OF LEFT KNEE: ICD-10-CM

## 2018-03-15 PROCEDURE — 99243 OFF/OP CNSLTJ NEW/EST LOW 30: CPT | Performed by: FAMILY MEDICINE

## 2018-03-15 NOTE — MR AVS SNAPSHOT
"              After Visit Summary   3/15/2018    Darius Bennett    MRN: [id number]           Patient Information     Date Of Birth          1990        Visit Information        Provider Department      3/15/2018 3:00 PM Dutch Cheema DO [facility]        Today's Diagnoses     Left knee pain, unspecified chronicity    -  1    Patellofemoral arthralgia of left knee           Follow-ups after your visit        Who to contact     If you have questions or need follow up information about today's clinic visit or your schedule please contact [facility] directly at [phone].  Normal or non-critical lab and imaging results will be communicated to you by MyChart, letter or phone within 4 business days after the clinic has received the results. If you do not hear from us within 7 days, please contact the clinic through MyChart or phone. If you have a critical or abnormal lab result, we will notify you by phone as soon as possible.  Submit refill requests through [employer] or call your pharmacy and they will forward the refill request to us. Please allow 3 business days for your refill to be completed.          Additional Information About Your Visit        MyChart Information     [employer] lets you send messages to your doctor, view your test results, renew your prescriptions, schedule appointments and more. To sign up, go to [email or web address] . Click on \"Log in\" on the left side of the screen, which will take you to the Welcome page. Then click on \"Sign up Now\" on the right side of the page.     You will be asked to enter the access code listed below, as well as some personal information. Please follow the directions to create your username and password.     Your access code is: [email or web address]  Expires: 2018  3:46 PM     Your access code will  in 90 days. If you need help or a new code, please call your [place] clinic or " [phone].        Care EveryWhere ID     This is your Care EveryWhere ID. This could be used by other organizations to access your [place] medical records  [id number]         Blood Pressure from Last 3 Encounters:   03/15/18 (P) 115/62   03/09/18 115/63   03/05/18 130/74    Weight from Last 3 Encounters:   03/15/18 (P) 195 lb (88.5 kg)   03/09/18 195 lb (88.5 kg)   03/05/18 198 lb (89.8 kg)              Today, you had the following     No orders found for display       Primary Care Provider Office Phone # Fax #    [facility] [phone] [address] [phone]        Equal Access to Services     SHAYNE ZAMORA : Abhijit yano Sokay, waaxda luqadaha, qaybta kaalmada adeegyada, josh del rosario . So [facility] [phone].    ATENCIÓN: Si habla español, tiene a scherer disposición servicios gratuitos de asistencia lingüística. Llame al [phone].    We comply with applicable federal civil rights laws and [place] laws. We do not discriminate on the basis of race, color, national origin, age, disability, sex, sexual orientation, or gender identity.            Thank you!     Thank you for choosing [facility]  for your care. Our goal is always to provide you with excellent care. Hearing back from our patients is one way we can continue to improve our services. Please take a few minutes to complete the written survey that you may receive in the mail after your visit with us. Thank you!             Your Updated Medication List - Protect others around you: Learn how to safely use, store and throw away your medicines at www.disposemymeds.org.          This list is accurate as of 3/15/18 11:59 PM.  Always use your most recent med list.                   Brand Name Dispense Instructions for use Diagnosis    cetirizine 10 MG tablet    zyrTEC    90 tablet    Take 1 tablet (10 mg) by mouth every evening     Post-nasal drip       FISH OIL OMEGA-3 PO           fluticasone 50 MCG/ACT spray    FLONASE    1 Bottle    Spray 1-2 sprays into both nostrils daily    Post-nasal drip       MULTIVITAMIN ADULT PO           triamcinolone 55 MCG/ACT Inhaler    NASACORT AQ    1 Bottle    Spray 2 sprays into both nostrils daily    Post-nasal drip

## 2018-03-15 NOTE — LETTER
"    3/15/2018         RE: Darius Bennett  [id number] [address] [id number]        Dear Colleague,    Thank you for referring your patient, Darius Bennett, to the [facility]. Please see a copy of my visit note below.    Darius Bennett  :  1990  DOS: 3/15/2018  MRN: [id number]    Sports Medicine Clinic Visit    PCP: [facility], [facility]    Darius Bennett is a 28 year old male who is seen in consultation at the request of  Nelly Aguirre C.N.P. presenting with chronic left knee pain.    Injury: Gradual onset of left knee pain over the last ~ 2 years, mostly with running > 15 minutes.  Pain located over left lateral, superior knee, nonradiating.  Additional Features:  Positive: swelling.  Symptoms are better with Ibuprofen and Rest.  Symptoms are worse with: running > 15 minutes.  Other evaluation and/or treatments so far consists of: Ibuprofen, Rest and PCP consult, physical therapy x2.  Recent imaging completed: MRI completed 3/9/17.  Prior History of related problems: none    Social History: works for  - [employer] full time    Review of Systems  Musculoskeletal: as above  Remainder of review of systems is negative including constitutional, CV, pulmonary, GI, Skin and Neurologic except as noted in HPI or medical history.    Past Medical History:   Diagnosis Date     Cluster headaches      IBS (irritable bowel syndrome)      Past Surgical History:   Procedure Laterality Date     Compartment release Bilateral     He reports compartment syndrome in both lower legs for which he had surgery       Objective  BP (P) 115/62  Ht (P) 5' 8.5\" (1.74 m)  Wt (P) 195 lb (88.5 kg)  BMI (P) 29.22 kg/m2    General: healthy, alert and in no distress    HEENT: no scleral icterus or conjunctival erythema   Skin: no suspicious lesions or rash. No jaundice.   CV: regular rhythm by palpation, 2+ distal pulses, no pedal edema    Resp: normal " respiratory effort without conversational dyspnea   Psych: normal mood and affect    Gait: nonantalgic, appropriate coordination and balance   Neuro: normal light touch sensory exam of the extremities. Motor strength as noted below     Left Knee exam    ROM:        Flexion 140 degrees       Extension 0 degrees       Range of motion limited by mild pain in terminal flexion    Inspection:       no visible ecchymosis        effusion noted trace    Skin:       no visible deformities       well perfused       capillary refill brisk    Patellar Motion:        Lateral tilt noted in patella       Crepitus noted in the patellofemoral joint       + J Sign    Tender:        lateral patellar border       medial joint line    Non Tender:         remainder of knee area        medial patellar border        lateral joint line        along MCL        distal IT Band        infrapatellar tendon        tibial tubercle       pes anserine bursa    Special Tests:        neg (-) Maegan       neg (-) Lachmans       neg (-) anterior drawer       neg (-) posterior drawer       neg (-) varus at 0 deg and 30 deg       neg (-) valgus at 0 deg and 30 deg    Evaluation of ipsilateral kinetic chain       normal strength with hip extension and abduction, but somewhat deconditioned b/l       Decreased strength with single leg squat b/l, R>L    Radiology  Results for orders placed or performed during the hospital encounter of 03/09/18   MR Knee Left w/o Contrast    Narrative    MR KNEE LEFT WITHOUT CONTRAST   3/9/2018 8:21 AM    HISTORY:  Chronic lateral side left knee pain, worse after running  severe pain. Chronic pain of left knee.    TECHNIQUE:  Sagittal proton density and T2, coronal T1, and coronal  and transverse fat suppressed T2 weighted images.    COMPARISON: None.    FINDINGS:   Medial Meniscus: No tear, displaced fragment, or extrusion.       Lateral Meniscus: No tear, displaced fragment, or extrusion.       Anterior Cruciate Ligament:  Intact.     Posterior Cruciate Ligament: Intact.     Medial Collateral Ligament: Intact.    Lateral Collateral Ligament Complex, Popliteus Tendon: The fibular  collateral ligament, biceps femoris tendon, popliteal tendon, and  iliotibial band are intact.    Osseous Structures and Cartilaginous Surfaces:  Articular cartilage  surfaces in the medial, lateral, and patellofemoral compartments  appear within normal limits. Marrow signal is within normal limits.    Extensor Mechanism: The quadriceps and infrapatellar tendons are  intact. The medial and lateral patellar retinacula appear  unremarkable. Mild lateral tilt of the patella.    Joint Space: There is a physiological amount of fluid in the joint  space.  No definite articular bodies are demonstrated.    Additional Findings:  No semimembranosus-tibial collateral ligament or  pes anserine bursitis. Minimal Baker's cyst.      Impression    IMPRESSION:    1. No meniscal tear or ligamentous injury.  2. Mild lateral tilt of the patella.    GUI HERNÁNDEZ MD       Assessment:  1. Left knee pain, unspecified chronicity    2. Patellofemoral arthralgia of left knee        Plan:  Discussed the assessment with the patient.  Follow up: prn  Follow up: 6-8 wks prn  Offered PT, signs of PFS, advised working with Mayito Santiago if desired  MR images independently visualized and reviewed with patient today in clinic  Results reassuring overall  Apply HEP to both legs, activity modifications reviewed  Need to work on hip and knee stabilization reviewed, training principles reviewed  Use of short 1-2 wk course of NSAIDs reviewed, dosing and precautions reviewed if utilized  Otherwise try to use tylenol prn, and not before competition or training to mask symptoms  RICE reviewed, as well as potential brace usage and principles of appropriate brace usage for comfort without becoming dependent  Painfree activity ok, limit squatting, lunging, jumping, stairs  We discussed modified progressive  pain-free activity as tolerated  Home handouts provided and supportive care reviewed  All questions were answered today  Contact us with additional questions or concerns  Signs and sx of concern reviewed    Thanks very much for sending this nice gentleman to us, I will keep you updated with his progress      Dutch Cheema DO, KAYODE  Primary Care Sports Medicine  [facility]           Disclaimer: This note consists of symbols derived from keyboarding, dictation and/or voice recognition software. As a result, there may be errors in the script that have gone undetected. Please consider this when interpreting information found in this chart.    Again, thank you for allowing me to participate in the care of your patient.        Sincerely,        Dutch Cheema DO

## 2018-03-15 NOTE — PROGRESS NOTES
"Darius Bennett  :  1990  DOS: 3/15/2018  MRN: [id number]    Sports Medicine Clinic Visit    PCP: [facility]    Darius Bennett is a 28 year old male who is seen in consultation at the request of  Nelly Aguirre C.N.P. presenting with chronic left knee pain.    Injury: Gradual onset of left knee pain over the last ~ 2 years, mostly with running > 15 minutes.  Pain located over left lateral, superior knee, nonradiating.  Additional Features:  Positive: swelling.  Symptoms are better with Ibuprofen and Rest.  Symptoms are worse with: running > 15 minutes.  Other evaluation and/or treatments so far consists of: Ibuprofen, Rest and PCP consult, physical therapy x2.  Recent imaging completed: MRI completed 3/9/17.  Prior History of related problems: none    Social History: works for  - [employer] full time    Review of Systems  Musculoskeletal: as above  Remainder of review of systems is negative including constitutional, CV, pulmonary, GI, Skin and Neurologic except as noted in HPI or medical history.    Past Medical History:   Diagnosis Date     Cluster headaches      IBS (irritable bowel syndrome)      Past Surgical History:   Procedure Laterality Date     Compartment release Bilateral     He reports compartment syndrome in both lower legs for which he had surgery       Objective  BP (P) 115/62  Ht (P) 5' 8.5\" (1.74 m)  Wt (P) 195 lb (88.5 kg)  BMI (P) 29.22 kg/m2    General: healthy, alert and in no distress    HEENT: no scleral icterus or conjunctival erythema   Skin: no suspicious lesions or rash. No jaundice.   CV: regular rhythm by palpation, 2+ distal pulses, no pedal edema    Resp: normal respiratory effort without conversational dyspnea   Psych: normal mood and affect    Gait: nonantalgic, appropriate coordination and balance   Neuro: normal light touch sensory exam of the extremities. Motor strength as noted below     Left Knee exam    ROM:        Flexion " 140 degrees       Extension 0 degrees       Range of motion limited by mild pain in terminal flexion    Inspection:       no visible ecchymosis        effusion noted trace    Skin:       no visible deformities       well perfused       capillary refill brisk    Patellar Motion:        Lateral tilt noted in patella       Crepitus noted in the patellofemoral joint       + J Sign    Tender:        lateral patellar border       medial joint line    Non Tender:         remainder of knee area        medial patellar border        lateral joint line        along MCL        distal IT Band        infrapatellar tendon        tibial tubercle       pes anserine bursa    Special Tests:        neg (-) Maegan       neg (-) Lachmans       neg (-) anterior drawer       neg (-) posterior drawer       neg (-) varus at 0 deg and 30 deg       neg (-) valgus at 0 deg and 30 deg    Evaluation of ipsilateral kinetic chain       normal strength with hip extension and abduction, but somewhat deconditioned b/l       Decreased strength with single leg squat b/l, R>L    Radiology  Results for orders placed or performed during the hospital encounter of 03/09/18   MR Knee Left w/o Contrast    Narrative    MR KNEE LEFT WITHOUT CONTRAST   3/9/2018 8:21 AM    HISTORY:  Chronic lateral side left knee pain, worse after running  severe pain. Chronic pain of left knee.    TECHNIQUE:  Sagittal proton density and T2, coronal T1, and coronal  and transverse fat suppressed T2 weighted images.    COMPARISON: None.    FINDINGS:   Medial Meniscus: No tear, displaced fragment, or extrusion.       Lateral Meniscus: No tear, displaced fragment, or extrusion.       Anterior Cruciate Ligament: Intact.     Posterior Cruciate Ligament: Intact.     Medial Collateral Ligament: Intact.    Lateral Collateral Ligament Complex, Popliteus Tendon: The fibular  collateral ligament, biceps femoris tendon, popliteal tendon, and  iliotibial band are intact.    Osseous  Structures and Cartilaginous Surfaces:  Articular cartilage  surfaces in the medial, lateral, and patellofemoral compartments  appear within normal limits. Marrow signal is within normal limits.    Extensor Mechanism: The quadriceps and infrapatellar tendons are  intact. The medial and lateral patellar retinacula appear  unremarkable. Mild lateral tilt of the patella.    Joint Space: There is a physiological amount of fluid in the joint  space.  No definite articular bodies are demonstrated.    Additional Findings:  No semimembranosus-tibial collateral ligament or  pes anserine bursitis. Minimal Baker's cyst.      Impression    IMPRESSION:    1. No meniscal tear or ligamentous injury.  2. Mild lateral tilt of the patella.    GUI HERNÁNDEZ MD       Assessment:  1. Left knee pain, unspecified chronicity    2. Patellofemoral arthralgia of left knee        Plan:  Discussed the assessment with the patient.  Follow up: prn  Follow up: 6-8 wks prn  Offered PT, signs of PFS, advised working with Mayito Santiago if desired  MR images independently visualized and reviewed with patient today in clinic  Results reassuring overall  Apply HEP to both legs, activity modifications reviewed  Need to work on hip and knee stabilization reviewed, training principles reviewed  Use of short 1-2 wk course of NSAIDs reviewed, dosing and precautions reviewed if utilized  Otherwise try to use tylenol prn, and not before competition or training to mask symptoms  RICE reviewed, as well as potential brace usage and principles of appropriate brace usage for comfort without becoming dependent  Painfree activity ok, limit squatting, lunging, jumping, stairs  We discussed modified progressive pain-free activity as tolerated  Home handouts provided and supportive care reviewed  All questions were answered today  Contact us with additional questions or concerns  Signs and sx of concern reviewed    Thanks very much for sending this nice gentleman to us, I  will keep you updated with his progress      Dutch Cheema DO, KAYODE  Primary Care Sports Medicine  [facility]           Disclaimer: This note consists of symbols derived from keyboarding, dictation and/or voice recognition software. As a result, there may be errors in the script that have gone undetected. Please consider this when interpreting information found in this chart.

## 2018-06-19 ENCOUNTER — OFFICE VISIT (OUTPATIENT)
Dept: FAMILY MEDICINE | Facility: CLINIC | Age: 28
End: 2018-06-19
Payer: OTHER GOVERNMENT

## 2018-06-19 ENCOUNTER — TELEPHONE (OUTPATIENT)
Dept: FAMILY MEDICINE | Facility: CLINIC | Age: 28
End: 2018-06-19

## 2018-06-19 VITALS
TEMPERATURE: 96.1 F | BODY MASS INDEX: 28.58 KG/M2 | RESPIRATION RATE: 14 BRPM | HEART RATE: 58 BPM | HEIGHT: 69 IN | DIASTOLIC BLOOD PRESSURE: 77 MMHG | SYSTOLIC BLOOD PRESSURE: 119 MMHG | WEIGHT: 193 LBS

## 2018-06-19 DIAGNOSIS — G44.019 EPISODIC CLUSTER HEADACHE, NOT INTRACTABLE: Primary | ICD-10-CM

## 2018-06-19 PROCEDURE — 99213 OFFICE O/P EST LOW 20 MIN: CPT | Performed by: FAMILY MEDICINE

## 2018-06-19 PROCEDURE — 99207 ZZC FOR CODING REVIEW: CPT | Performed by: FAMILY MEDICINE

## 2018-06-19 RX ORDER — ZOLMITRIPTAN 5 MG/1
1 SPRAY NASAL
Qty: 30 EACH | Refills: 11 | Status: SHIPPED | OUTPATIENT
Start: 2018-06-19 | End: 2018-06-19

## 2018-06-19 RX ORDER — ZOLMITRIPTAN 5 MG/1
5 SPRAY NASAL
COMMUNITY
Start: 2017-02-14 | End: 2018-06-19

## 2018-06-19 RX ORDER — VERAPAMIL HYDROCHLORIDE 120 MG/1
120 TABLET, FILM COATED, EXTENDED RELEASE ORAL
COMMUNITY
Start: 2017-02-15 | End: 2018-06-19

## 2018-06-19 RX ORDER — SUMATRIPTAN 20 MG/1
1 SPRAY NASAL PRN
Qty: 1 EACH | Refills: 11 | Status: SHIPPED | OUTPATIENT
Start: 2018-06-19 | End: 2021-03-30 | Stop reason: ALTCHOICE

## 2018-06-19 RX ORDER — VERAPAMIL HYDROCHLORIDE 120 MG/1
120 TABLET, FILM COATED, EXTENDED RELEASE ORAL AT BEDTIME
Qty: 90 TABLET | Refills: 3 | Status: SHIPPED | OUTPATIENT
Start: 2018-06-19 | End: 2021-05-05 | Stop reason: DRUGHIGH

## 2018-06-19 NOTE — TELEPHONE ENCOUNTER
Rx for sumatriptan nasal spray signed and sent to pharmacy.  Zolmitriptan rx removed from med list.

## 2018-06-19 NOTE — PATIENT INSTRUCTIONS
Use Zolmitriptan as prescribed for cluster headache attacks.  If not covered by insurance, possible trial of sumatriptan can be given.    Verapamil as prescribed for preventive treatment.    Try to obtain Oxygen tank/equipment for treatment of cluster headaches.  Cluster Headache  A cluster headache is different from a migraine or a tension headache. A cluster headache starts suddenly, without any warning. The pain can become severe within minutes. The headache is often brief. It can last only 15 minutes. But it can also continue for several hours.  The pain is around a single eye. You may have tears coming from that eye. That eyelid may become droopy. The eye may be red and swollen. You may also have a stuffy or runny nose on the affected side.  You may also have several headaches in one 24-hour period. These may return at the same time of day during the cluster period. A cluster headache ends as suddenly as it began. It often leaves you feeling exhausted for some time afterward.  Cluster headaches often occur at night and during certain times of the year that are unique to you. A cluster period ranges from a few weeks up to a few months. Then, the headaches may not come back for months or years.  Possible triggers include alcohol and smoking. Even a single alcoholic drink can trigger a headache during the cluster period. Another potential trigger is interrupted sleep patterns. Medicines like nitroglycerin can also cause a cluster headache.  Treatment for cluster headaches varies. Headaches that last only 15 minutes aren t helped by over-the-counter medicines. That s because these medicines take 20 to 30 minutes to start working. Prescription medicines given by injection or as a nasal spray can stop a headache. This is called abortive treatment.  Preventive medicines include steroids and anti-seizure medicines. These can help cut the number of headaches you have during a cluster period. High dose oxygen therapy or  a nerve stimulator may shorten each attack and make it less severe. If you have cluster headaches often or have severe symptoms, your healthcare provider may talk with you about surgery. Surgery may be able to stop the nerve that s sending the pain signals. These types of treatments are usually given by a specialist (neurologist or neurosurgeon).  Home care  Follow these tips when caring for yourself at home:    Don t drive yourself home if you were given pain medicine for your headache. Instead, have someone else drive you home. Try to sleep when you get home. You should feel much better when you wake up.    If your healthcare provider gave you preventive medicines, take them as directed. If abortive medicines were prescribed, carry these with you to take at first sign of the headache.    Stick to a regular sleep schedule. Avoid afternoon naps during a cluster period. If you have sleep apnea, talk with your provider about getting treatment for this condition. Sleep apnea greatly interferes with sleep patterns.    Stay away from gas fumes and oil-based paint fumes.    Avoid drinking alcohol and smoking during a cluster period.    Be cautious when traveling to high altitudes. Higher altitudes have less oxygen. This may trigger a headache.    Use sunglasses to avoid glare and bright lights.    Keep a headache journal. Record the date and time of each headache. Describe the quality of the pain. Note how severe it is, where it is, and how long it lasts. Write down your response to any medicines to control the pain. Note possible triggers: Was it something you ate? Something you did just before the attack? Share this information with your provider to help him or her figure out the best treatment plan for you.    Talk with a counselor or therapist, or join a support group. This may help you cope with the effects of cluster headache on your life.  Follow-up care  Follow up with your healthcare provider, or as advised If  you had a CT scan or an MRI, a specialist will review it. You will be told of any new findings that may affect your care.  When to seek medical advice  Call your healthcare provider right away if any of these occur:    Sudden, severe headache, worse than any you have had before    Headache with a fainting spell    Unexplained fever greater than 100.0  F (37.8  C), or as advised    Stiff neck or rash    Weakness in an arm or leg, or on one side of your face    Difficulty speaking    Headaches brought on by physical activity    Changes in your vision    You need to use more pain medicine than normal  Date Last Reviewed: 8/1/2016 2000-2017 [employer]. [address] [place] [address], [place] [address]. All rights reserved. This information is not intended as a substitute for professional medical care. Always follow your healthcare professional's instructions.

## 2018-06-19 NOTE — NURSING NOTE
"Chief Complaint   Patient presents with     Headache     Patient presents today with start up of cluster headaches (began last night with another headache this morning).  Last cluster cycle was December 2015 to February 2016.  Presents today with medication refills for Zomig nasal spray and also verapamil 120 mg.       Initial /77  Pulse 58  Temp 96.1  F (35.6  C) (Tympanic)  Resp 14  Ht 5' 8.5\" (1.74 m)  Wt 193 lb (87.5 kg)  BMI 28.92 kg/m2 Estimated body mass index is 28.92 kg/(m^2) as calculated from the following:    Height as of this encounter: 5' 8.5\" (1.74 m).    Weight as of this encounter: 193 lb (87.5 kg).    Medication Reconciliation:  complete    Nellie Cid CMA (AAMA)  "

## 2018-06-19 NOTE — PROGRESS NOTES
SUBJECTIVE:   Darius Bennett is a 28 year old male who presents to clinic today for the following health issues:      Medication Followup of ZOMIG NASAL SPRAY, VERAPAMIL 120 MG    Taking Medication as prescribed: no, patient needs refills on both Rx, however, did take one dose of Zomig this morning.    Side Effects:  Unsure - this morning patient reports his left shoulder started tightening up after taking  Rx.    Medication Helping Symptoms:  yes     Patient states in last 2 years has been in remission from cluster headaches until last night.   Patient has zolmitriptan -  since 2017.  On verapamil preventive treatment.      Problem list and histories reviewed & adjusted, as indicated.  Additional history: as documented    Patient Active Problem List   Diagnosis     Cluster headaches     IBS (irritable bowel syndrome)     Past Surgical History:   Procedure Laterality Date     Compartment release Bilateral     He reports compartment syndrome in both lower legs for which he had surgery       Social History   Substance Use Topics     Smoking status: Never Smoker     Smokeless tobacco: Former User     Alcohol use Yes     Family History   Problem Relation Age of Onset     Diabetes Paternal Grandmother      Kidney Cancer Paternal Grandfather          Current Outpatient Prescriptions   Medication Sig Dispense Refill     Multiple Vitamins-Minerals (MULTIVITAMIN ADULT PO)        Omega-3 Fatty Acids (FISH OIL OMEGA-3 PO)        order for DME Equipment being ordered: Oxygen 1 each 0     verapamil (CALAN-SR) 120 MG TBCR CR tablet Take 1 tablet (120 mg) by mouth At Bedtime 90 tablet 3     cetirizine (ZYRTEC) 10 MG tablet Take 1 tablet (10 mg) by mouth every evening 90 tablet 3     fluticasone (FLONASE) 50 MCG/ACT spray Spray 1-2 sprays into both nostrils daily 1 Bottle 11     SUMAtriptan (IMITREX) 20 MG/ACT nasal spray Spray 1 spray in nostril as needed for migraine 1 each 11     triamcinolone (NASACORT  "AQ) 55 MCG/ACT Inhaler Spray 2 sprays into both nostrils daily 1 Bottle 3     [DISCONTINUED] verapamil (CALAN-SR) 120 MG TBCR CR tablet Take 120 mg by mouth       No Known Allergies    Reviewed and updated as needed this visit by clinical staff  Tobacco  Allergies  Meds  Problems  Med Hx  Surg Hx  Fam Hx  Soc Hx        Reviewed and updated as needed this visit by Provider  Allergies  Meds  Problems         ROS:  CONSTITUTIONAL: NEGATIVE for fever, chills, change in weight  INTEGUMENTARY/SKIN: NEGATIVE for worrisome rashes, moles or lesions  EYES: NEGATIVE for vision changes or irritation  ENT/MOUTH: NEGATIVE for ear, mouth and throat problems  RESP: NEGATIVE for significant cough or SOB  CV: NEGATIVE for chest pain, palpitations or peripheral edema  GI: NEGATIVE for nausea, abdominal pain, heartburn, or change in bowel habits  : NEGATIVE for frequency, dysuria, or hematuria  MUSCULOSKELETAL: NEGATIVE for significant arthralgias or myalgia  NEURO: NEGATIVE for weakness, dizziness or paresthesias  ENDOCRINE: NEGATIVE for temperature intolerance, skin/hair changes  HEME: NEGATIVE for bleeding problems    OBJECTIVE:                                                    /77  Pulse 58  Temp 96.1  F (35.6  C) (Tympanic)  Resp 14  Ht 5' 8.5\" (1.74 m)  Wt 193 lb (87.5 kg)  BMI 28.92 kg/m2  Body mass index is 28.92 kg/(m^2).  GENERAL: well-nourished, alert and no distress, ambulatory w/o assist; gait normal  EYES: no icterus; EOMI, PERRLA  NECK: no tenderness, no adenopathy,  Thyroid not enlarged  RESP: lungs clear to auscultation - no rales, no rhonchi, no wheezes  CV: regular rates and rhythm, no murmur  MS: no edema  SKIN: no suspicious lesions, no rashes  NEURO: Patient is A and O X 3; Cranial nerves 2-12 intact;  Strength 5/5 all extremities; DTR: ++ x 4;  Sensory no deficit; no tremors    Diagnostic test results:  Diagnostic Test Results:  none      ASSESSMENT/PLAN:                                   "                      ICD-10-CM    1. Episodic cluster headache, not intractable G44.019 order for DME     verapamil (CALAN-SR) 120 MG TBCR CR tablet      ZOLMitriptan 5 MG SOLN     Patient is well-versed with his treatment for cluster headaches - states has been seen by  provider, a previous primary provider and a neurologist.  Patient states he feels he is in a new cycle of cluster HA since 2 days ago - hence need new Rxs.  Patient unsure if zolmitriptan is covered under insurance so will try first, and if not, try sumatriptan nasal.  Reinforced role of preventive treatment - patient concurred to start verapamil.  O2 rx given to patient - was given in the past but patient never pursued the Rx.  Other symptomatic measures reinforced.  Consider neurology consult if difficult to control CH.  Return precautions discussed and given to patient.        Follow up with Provider - prn   Patient Instructions   Use Zolmitriptan as prescribed for cluster headache attacks.  If not covered by insurance, possible trial of sumatriptan can be given.    Verapamil as prescribed for preventive treatment.    Try to obtain Oxygen tank/equipment for treatment of cluster headaches.  Cluster Headache  A cluster headache is different from a migraine or a tension headache. A cluster headache starts suddenly, without any warning. The pain can become severe within minutes. The headache is often brief. It can last only 15 minutes. But it can also continue for several hours.  The pain is around a single eye. You may have tears coming from that eye. That eyelid may become droopy. The eye may be red and swollen. You may also have a stuffy or runny nose on the affected side.  You may also have several headaches in one 24-hour period. These may return at the same time of day during the cluster period. A cluster headache ends as suddenly as it began. It often leaves you feeling exhausted for some time afterward.  Cluster headaches often occur  at night and during certain times of the year that are unique to you. A cluster period ranges from a few weeks up to a few months. Then, the headaches may not come back for months or years.  Possible triggers include alcohol and smoking. Even a single alcoholic drink can trigger a headache during the cluster period. Another potential trigger is interrupted sleep patterns. Medicines like nitroglycerin can also cause a cluster headache.  Treatment for cluster headaches varies. Headaches that last only 15 minutes aren t helped by over-the-counter medicines. That s because these medicines take 20 to 30 minutes to start working. Prescription medicines given by injection or as a nasal spray can stop a headache. This is called abortive treatment.  Preventive medicines include steroids and anti-seizure medicines. These can help cut the number of headaches you have during a cluster period. High dose oxygen therapy or a nerve stimulator may shorten each attack and make it less severe. If you have cluster headaches often or have severe symptoms, your healthcare provider may talk with you about surgery. Surgery may be able to stop the nerve that s sending the pain signals. These types of treatments are usually given by a specialist (neurologist or neurosurgeon).  Home care  Follow these tips when caring for yourself at home:    Don t drive yourself home if you were given pain medicine for your headache. Instead, have someone else drive you home. Try to sleep when you get home. You should feel much better when you wake up.    If your healthcare provider gave you preventive medicines, take them as directed. If abortive medicines were prescribed, carry these with you to take at first sign of the headache.    Stick to a regular sleep schedule. Avoid afternoon naps during a cluster period. If you have sleep apnea, talk with your provider about getting treatment for this condition. Sleep apnea greatly interferes with sleep  patterns.    Stay away from gas fumes and oil-based paint fumes.    Avoid drinking alcohol and smoking during a cluster period.    Be cautious when traveling to high altitudes. Higher altitudes have less oxygen. This may trigger a headache.    Use sunglasses to avoid glare and bright lights.    Keep a headache journal. Record the date and time of each headache. Describe the quality of the pain. Note how severe it is, where it is, and how long it lasts. Write down your response to any medicines to control the pain. Note possible triggers: Was it something you ate? Something you did just before the attack? Share this information with your provider to help him or her figure out the best treatment plan for you.    Talk with a counselor or therapist, or join a support group. This may help you cope with the effects of cluster headache on your life.  Follow-up care  Follow up with your healthcare provider, or as advised If you had a CT scan or an MRI, a specialist will review it. You will be told of any new findings that may affect your care.  When to seek medical advice  Call your healthcare provider right away if any of these occur:    Sudden, severe headache, worse than any you have had before    Headache with a fainting spell    Unexplained fever greater than 100.0  F (37.8  C), or as advised    Stiff neck or rash    Weakness in an arm or leg, or on one side of your face    Difficulty speaking    Headaches brought on by physical activity    Changes in your vision    You need to use more pain medicine than normal  Date Last Reviewed: 8/1/2016 2000-2017 [employer]. [address] [place] [address], [place] [address]. All rights reserved. This information is not intended as a substitute for professional medical care. Always follow your healthcare professional's instructions.            Jaron Hamm MD  [facility]

## 2018-06-19 NOTE — MR AVS SNAPSHOT
After Visit Summary   6/19/2018    Darius Bennett    MRN: [id number]           Patient Information     Date Of Birth          1990        Visit Information        Provider Department      6/19/2018 11:20 AM Jaron Hamm MD [facility]        Today's Diagnoses     Episodic cluster headache, not intractable    -  1      Care Instructions    Use Zolmitriptan as prescribed for cluster headache attacks.  If not covered by insurance, possible trial of sumatriptan can be given.    Verapamil as prescribed for preventive treatment.    Try to obtain Oxygen tank/equipment for treatment of cluster headaches.  Cluster Headache  A cluster headache is different from a migraine or a tension headache. A cluster headache starts suddenly, without any warning. The pain can become severe within minutes. The headache is often brief. It can last only 15 minutes. But it can also continue for several hours.  The pain is around a single eye. You may have tears coming from that eye. That eyelid may become droopy. The eye may be red and swollen. You may also have a stuffy or runny nose on the affected side.  You may also have several headaches in one 24-hour period. These may return at the same time of day during the cluster period. A cluster headache ends as suddenly as it began. It often leaves you feeling exhausted for some time afterward.  Cluster headaches often occur at night and during certain times of the year that are unique to you. A cluster period ranges from a few weeks up to a few months. Then, the headaches may not come back for months or years.  Possible triggers include alcohol and smoking. Even a single alcoholic drink can trigger a headache during the cluster period. Another potential trigger is interrupted sleep patterns. Medicines like nitroglycerin can also cause a cluster headache.  Treatment for cluster headaches varies. Headaches that last only 15 minutes aren t helped  by over-the-counter medicines. That s because these medicines take 20 to 30 minutes to start working. Prescription medicines given by injection or as a nasal spray can stop a headache. This is called abortive treatment.  Preventive medicines include steroids and anti-seizure medicines. These can help cut the number of headaches you have during a cluster period. High dose oxygen therapy or a nerve stimulator may shorten each attack and make it less severe. If you have cluster headaches often or have severe symptoms, your healthcare provider may talk with you about surgery. Surgery may be able to stop the nerve that s sending the pain signals. These types of treatments are usually given by a specialist (neurologist or neurosurgeon).  Home care  Follow these tips when caring for yourself at home:    Don t drive yourself home if you were given pain medicine for your headache. Instead, have someone else drive you home. Try to sleep when you get home. You should feel much better when you wake up.    If your healthcare provider gave you preventive medicines, take them as directed. If abortive medicines were prescribed, carry these with you to take at first sign of the headache.    Stick to a regular sleep schedule. Avoid afternoon naps during a cluster period. If you have sleep apnea, talk with your provider about getting treatment for this condition. Sleep apnea greatly interferes with sleep patterns.    Stay away from gas fumes and oil-based paint fumes.    Avoid drinking alcohol and smoking during a cluster period.    Be cautious when traveling to high altitudes. Higher altitudes have less oxygen. This may trigger a headache.    Use sunglasses to avoid glare and bright lights.    Keep a headache journal. Record the date and time of each headache. Describe the quality of the pain. Note how severe it is, where it is, and how long it lasts. Write down your response to any medicines to control the pain. Note possible  triggers: Was it something you ate? Something you did just before the attack? Share this information with your provider to help him or her figure out the best treatment plan for you.    Talk with a counselor or therapist, or join a support group. This may help you cope with the effects of cluster headache on your life.  Follow-up care  Follow up with your healthcare provider, or as advised If you had a CT scan or an MRI, a specialist will review it. You will be told of any new findings that may affect your care.  When to seek medical advice  Call your healthcare provider right away if any of these occur:    Sudden, severe headache, worse than any you have had before    Headache with a fainting spell    Unexplained fever greater than 100.0  F (37.8  C), or as advised    Stiff neck or rash    Weakness in an arm or leg, or on one side of your face    Difficulty speaking    Headaches brought on by physical activity    Changes in your vision    You need to use more pain medicine than normal  Date Last Reviewed: 8/1/2016 2000-2017 [employer]. [address] [place] [address]. All rights reserved. This information is not intended as a substitute for professional medical care. Always follow your healthcare professional's instructions.                Follow-ups after your visit        Who to contact     If you have questions or need follow up information about today's clinic visit or your schedule please contact [facility] directly at [phone].  Normal or non-critical lab and imaging results will be communicated to you by MyChart, letter or phone within 4 business days after the clinic has received the results. If you do not hear from us within 7 days, please contact the clinic through MyChart or phone. If you have a critical or abnormal lab result, we will notify you by phone as soon as possible.  Submit refill requests through [employer] or call your pharmacy and they will forward  "the refill request to us. Please allow 3 business days for your refill to be completed.          Additional Information About Your Visit        [employer] Information     Viyet lets you send messages to your doctor, view your test results, renew your prescriptions, schedule appointments and more. To sign up, go to [email or web address] . Click on \"Log in\" on the left side of the screen, which will take you to the Welcome page. Then click on \"Sign up Now\" on the right side of the page.     You will be asked to enter the access code listed below, as well as some personal information. Please follow the directions to create your username and password.     Your access code is: [email or web address]  Expires: 2018 11:01 AM     Your access code will  in 90 days. If you need help or a new code, please call your [place] clinic or [phone].        Care EveryWhere ID     This is your Care EveryWhere ID. This could be used by other organizations to access your [place] medical records  [id number]        Your Vitals Were     Pulse Temperature Respirations Height BMI (Body Mass Index)       58 96.1  F (35.6  C) (Tympanic) 14 5' 8.5\" (1.74 m) 28.92 kg/m2        Blood Pressure from Last 3 Encounters:   18 119/77   03/15/18 (P) 115/62   18 115/63    Weight from Last 3 Encounters:   18 193 lb (87.5 kg)   03/15/18 (P) 195 lb (88.5 kg)   18 195 lb (88.5 kg)              Today, you had the following     No orders found for display         Today's Medication Changes          These changes are accurate as of 18 11:50 AM.  If you have any questions, ask your nurse or doctor.               Start taking these medicines.        Dose/Directions    order for DME   Used for:  Episodic cluster headache, not intractable   Started by:  Jaron Hamm MD        Equipment being ordered: Oxygen   Quantity:  1 each   Refills:  0         These medicines have changed or have updated prescriptions.        " Dose/Directions    verapamil 120 MG Tbcr CR tablet   Commonly known as:  CALAN-SR   This may have changed:  when to take this   Used for:  Episodic cluster headache, not intractable   Changed by:  Jaron Hamm MD        Dose:  120 mg   Take 1 tablet (120 mg) by mouth At Bedtime   Quantity:  90 tablet   Refills:  3       ZOLMitriptan 5 MG Soln   This may have changed:    - how much to take  - how to take this  - when to take this  - reasons to take this   Used for:  Episodic cluster headache, not intractable   Changed by:  Jaron Hamm MD        Dose:  1 spray   Spray 1 spray in nostril at onset of headache for migraine   Quantity:  30 each   Refills:  11            Where to get your medicines      These medications were sent to [facility] [address] [address]     Phone:  [phone]     verapamil 120 MG Tbcr CR tablet    ZOLMitriptan 5 MG Soln         Some of these will need a paper prescription and others can be bought over the counter.  Ask your nurse if you have questions.     Bring a paper prescription for each of these medications     order for DME                Primary Care Provider Office Phone # Fax #    [facility] [phone] [address] [facility] [address]        Equal Access to Services     SHAYNE ZAMORA AH: Abhijit zheng Sokay, waaxda luqadaha, qaybta kaalmada adeegyada, josh clark. So [facility] [phone].    ATENCIÓN: Si habla español, tiene a scherer disposición servicios gratuitos de asistencia lingüística. Llame al [phone].    We comply with applicable federal civil rights laws and [place] laws. We do not discriminate on the basis of race, color, national origin, age, disability, sex, sexual orientation, or gender identity.            Thank you!     Thank you for choosing [facility]  for your care.  Our goal is always to provide you with excellent care. Hearing back from our patients is one way we can continue to improve our services. Please take a few minutes to complete the written survey that you may receive in the mail after your visit with us. Thank you!             Your Updated Medication List - Protect others around you: Learn how to safely use, store and throw away your medicines at www.disposemymeds.org.          This list is accurate as of 6/19/18 11:50 AM.  Always use your most recent med list.                   Brand Name Dispense Instructions for use Diagnosis    cetirizine 10 MG tablet    zyrTEC    90 tablet    Take 1 tablet (10 mg) by mouth every evening    Post-nasal drip       FISH OIL OMEGA-3 PO           fluticasone 50 MCG/ACT spray    FLONASE    1 Bottle    Spray 1-2 sprays into both nostrils daily    Post-nasal drip       MULTIVITAMIN ADULT PO           order for DME     1 each    Equipment being ordered: Oxygen    Episodic cluster headache, not intractable       triamcinolone 55 MCG/ACT Inhaler    NASACORT AQ    1 Bottle    Spray 2 sprays into both nostrils daily    Post-nasal drip       verapamil 120 MG Tbcr CR tablet    CALAN-SR    90 tablet    Take 1 tablet (120 mg) by mouth At Bedtime    Episodic cluster headache, not intractable       ZOLMitriptan 5 MG Soln     30 each    Spray 1 spray in nostril at onset of headache for migraine    Episodic cluster headache, not intractable

## 2018-07-05 ENCOUNTER — TELEPHONE (OUTPATIENT)
Dept: FAMILY MEDICINE | Facility: CLINIC | Age: 28
End: 2018-07-05

## 2018-07-05 DIAGNOSIS — G44.019 EPISODIC CLUSTER HEADACHE, NOT INTRACTABLE: Primary | ICD-10-CM

## 2018-07-05 NOTE — TELEPHONE ENCOUNTER
Dr. Hamm,    Looks like the patient is  and going out of town for a week asking for a early refill of the medication.   Although his insurance may not cover it??  Please advise. Britni BRADY RN

## 2018-07-05 NOTE — TELEPHONE ENCOUNTER
Patient's pharmacy may be advised to fill the Rx early so patient can have the medication with him when he goes out of town.

## 2018-07-05 NOTE — TELEPHONE ENCOUNTER
According to labeling, sumatriptan nasal is generally used for migraines.  Even for migraines, use of sumatriptan nasal solution spray is recommended for up to 2 sprays per 24 hours. The efficacy and safety of treatment of four headaches in a 30 day period is not documented.    Prior auth for 2 boxes may be started but PA's can take several days for approval.    Patient will need neurology consult if he continues to have more frequent headaches that requires imitrex treatment based on the above recommended use of the med.   Referral has been placed for neurology if patient agrees to see one.

## 2018-07-05 NOTE — TELEPHONE ENCOUNTER
Patient calling clinic   He reports he is leaving today for  duties  Advised patient that pharmacy reports he will have to pay cash to fill early  Patient reports:  1 box has 6 spray bottles that are single dose, that lasts about 4-6 days  3 box RX is more realistic for a 30 days worth of medication  Insurance reports pharmacy that 1 box is what is allowed for 30 days, anything outside of that requires a prior auth  Ok to PA for 3 boxes of nasal Imitrex ?    Routing to provider.    Piper VALLECILLO Rn

## 2018-07-05 NOTE — TELEPHONE ENCOUNTER
Reason for Call:  Nasal Imitrex    Detailed comments: patient is calling and stating he is headed out on a  trip for 1 week and was trying to fill this medication. It was prescribed 6/19/2018 1 bottle with 11 refills. He states he is using it up a lot sooner than once a month. He is out and needs this for his headaches which are daily. [facility] will not fill it this soon, with out MD's approval. Please advise.    Phone Number Patient can be reached at: Cell number on file:    Telephone Information:   Mobile [phone]       Best Time: any    Can we leave a detailed message on this number? YES     Patricia Lund  Clinic [facility] [place] [facility]      Call taken on 7/5/2018 at 12:46 PM by Patricia Lund

## 2018-07-06 NOTE — TELEPHONE ENCOUNTER
When stationed in [place] was seeing Neurologist. Advised patient to have medical records sent to [facility]. He was told then to try Zomig. Patient was denied prior because he didn't try and fail sumatriptan. He is now asking if Dr. Hamm would be willing to send a prescription in for this medication and send in a prior authorization on this medication.    Send prescription or have patient see neuro for this?    Thank you,     Felicia CABRERA RN

## 2018-07-08 ENCOUNTER — TELEPHONE (OUTPATIENT)
Dept: FAMILY MEDICINE | Facility: CLINIC | Age: 28
End: 2018-07-08

## 2018-07-08 NOTE — TELEPHONE ENCOUNTER
Sumatriptan is indicated for cluster headaches in the form of injection subcutaneously. See Uptodate; non-response after a second dose is nto well documented for safety or efficcacy.    If used for migraines, Intranasal sumatriptan is recommended for treating of up to 4 headaches in a 30-day period; more than that, the safety is not well-documented. Hence using Imitrex intranasal for more than the labeled dose is unusual.    Prior authorization can be started for 2 boxes. Use of more of this medication beyond that should be consulted with and overseen by a neurologist.

## 2018-07-09 ENCOUNTER — HOSPITAL ENCOUNTER (EMERGENCY)
Facility: CLINIC | Age: 28
Discharge: HOME | End: 2018-07-09
Attending: STUDENT IN AN ORGANIZED HEALTH CARE EDUCATION/TRAINING PROGRAM | Admitting: STUDENT IN AN ORGANIZED HEALTH CARE EDUCATION/TRAINING PROGRAM
Payer: OTHER GOVERNMENT

## 2018-07-09 VITALS
SYSTOLIC BLOOD PRESSURE: 119 MMHG | RESPIRATION RATE: 22 BRPM | WEIGHT: 180 LBS | OXYGEN SATURATION: 95 % | BODY MASS INDEX: 26.97 KG/M2 | DIASTOLIC BLOOD PRESSURE: 79 MMHG | TEMPERATURE: 98.1 F

## 2018-07-09 DIAGNOSIS — G44.011 INTRACTABLE EPISODIC CLUSTER HEADACHE: ICD-10-CM

## 2018-07-09 PROCEDURE — 25000128 H RX IP 250 OP 636: Performed by: STUDENT IN AN ORGANIZED HEALTH CARE EDUCATION/TRAINING PROGRAM

## 2018-07-09 PROCEDURE — 96374 THER/PROPH/DIAG INJ IV PUSH: CPT

## 2018-07-09 PROCEDURE — 99284 EMERGENCY DEPT VISIT MOD MDM: CPT | Mod: Z6 | Performed by: STUDENT IN AN ORGANIZED HEALTH CARE EDUCATION/TRAINING PROGRAM

## 2018-07-09 PROCEDURE — 96361 HYDRATE IV INFUSION ADD-ON: CPT

## 2018-07-09 PROCEDURE — 96375 TX/PRO/DX INJ NEW DRUG ADDON: CPT

## 2018-07-09 PROCEDURE — 99284 EMERGENCY DEPT VISIT MOD MDM: CPT | Mod: 25

## 2018-07-09 PROCEDURE — 25000125 ZZHC RX 250: Performed by: STUDENT IN AN ORGANIZED HEALTH CARE EDUCATION/TRAINING PROGRAM

## 2018-07-09 RX ORDER — LIDOCAINE HYDROCHLORIDE 20 MG/ML
1 INJECTION, SOLUTION EPIDURAL; INFILTRATION; INTRACAUDAL; PERINEURAL ONCE
Status: COMPLETED | OUTPATIENT
Start: 2018-07-09 | End: 2018-07-09

## 2018-07-09 RX ORDER — DIPHENHYDRAMINE HYDROCHLORIDE 50 MG/ML
25 INJECTION INTRAMUSCULAR; INTRAVENOUS ONCE
Status: COMPLETED | OUTPATIENT
Start: 2018-07-09 | End: 2018-07-09

## 2018-07-09 RX ORDER — BENZTROPINE MESYLATE 1 MG/ML
1 INJECTION, SOLUTION INTRAMUSCULAR; INTRAVENOUS ONCE
Status: COMPLETED | OUTPATIENT
Start: 2018-07-09 | End: 2018-07-09

## 2018-07-09 RX ORDER — DEXAMETHASONE SODIUM PHOSPHATE 10 MG/ML
10 INJECTION, SOLUTION INTRAMUSCULAR; INTRAVENOUS ONCE
Status: COMPLETED | OUTPATIENT
Start: 2018-07-09 | End: 2018-07-09

## 2018-07-09 RX ADMIN — DEXAMETHASONE SODIUM PHOSPHATE 10 MG: 10 INJECTION, SOLUTION INTRAMUSCULAR; INTRAVENOUS at 02:47

## 2018-07-09 RX ADMIN — PROCHLORPERAZINE EDISYLATE 10 MG: 5 INJECTION INTRAMUSCULAR; INTRAVENOUS at 03:18

## 2018-07-09 RX ADMIN — BENZTROPINE MESYLATE 1 MG: 1 INJECTION INTRAMUSCULAR; INTRAVENOUS at 03:58

## 2018-07-09 RX ADMIN — SODIUM CHLORIDE 1000 ML: 9 INJECTION, SOLUTION INTRAVENOUS at 03:18

## 2018-07-09 RX ADMIN — LIDOCAINE HYDROCHLORIDE 1 ML: 20 INJECTION, SOLUTION EPIDURAL; INFILTRATION; INTRACAUDAL; PERINEURAL at 02:44

## 2018-07-09 RX ADMIN — DIPHENHYDRAMINE HYDROCHLORIDE 25 MG: 50 INJECTION, SOLUTION INTRAMUSCULAR; INTRAVENOUS at 03:18

## 2018-07-09 NOTE — ED PROVIDER NOTES
History     Chief Complaint   Patient presents with     Headache     hx of cluster headache     HPI  Darius Bennett is a 28 year old male with medical history which includes IBS and cluster headaches who presents for evaluation of sharp stabbing left-sided headache.  Patient explains that he had been in remission from cluster headaches for nearly 2.5 years until 6/9/18 when they suddenly began reoccurring.  He has had between 1-4 episodes per day lasting up to 30 minutes per episode, but then has had none over the past 4 days and believed that he may have entered remission again.  90 minutes prior to arrival his sharp stabbing left-sided headache began, retro-orbital, severe, and consistent with his long history of cluster headaches.  He denies recent fever, chills, neck stiffness, visual changes, injury or illness.  He had taken his sumatriptan without relief.    Problem List:    Patient Active Problem List    Diagnosis Date Noted     Cluster headaches      Priority: Medium     IBS (irritable bowel syndrome)      Priority: Medium        Past Medical History:    Past Medical History:   Diagnosis Date     Cluster headaches      IBS (irritable bowel syndrome)        Past Surgical History:    Past Surgical History:   Procedure Laterality Date     Compartment release Bilateral 2007    He reports compartment syndrome in both lower legs for which he had surgery       Family History:    Family History   Problem Relation Age of Onset     Diabetes Paternal Grandmother      Kidney Cancer Paternal Grandfather        Social History:  Marital Status:  Single [1]  Social History   Substance Use Topics     Smoking status: Never Smoker     Smokeless tobacco: Former User     Alcohol use Yes        Medications:      SUMAtriptan (IMITREX) 20 MG/ACT nasal spray   verapamil (CALAN-SR) 120 MG TBCR CR tablet   cetirizine (ZYRTEC) 10 MG tablet   fluticasone (FLONASE) 50 MCG/ACT spray   Multiple Vitamins-Minerals (MULTIVITAMIN ADULT  PO)   Omega-3 Fatty Acids (FISH OIL OMEGA-3 PO)   order for DME   triamcinolone (NASACORT AQ) 55 MCG/ACT Inhaler         Review of Systems  Constitutional:  Negative for fever or recent illness.  HENT:  Negative for nasal congestion or sore throat.  Eye:  Negative for visual changes from baseline.  Respiratory:  Negative for cough or shortness of breath.  Gastrointestinal:  Negative for abdominal pain.  Musculoskeletal:  Negative for neck pain or recent injury.  Neurological: Sharp stabbing left-sided headache.  Negative for sensory deficits or weakness.    All others reviewed and are negative.      Physical Exam   BP: (!) 139/98  Heart Rate: 74  Temp: 98.1  F (36.7  C)  Resp: 22  Weight: 81.6 kg (180 lb)  SpO2: 98 %      Physical Exam  Constitutional:  Well developed, well nourished.  Appears nontoxic but moderate discomfort secondary to headache.   Head:  Normocephalic and atraumatic.  Symmetrical in appearance.  No palpable temporal arteries.  Eyes:  Conjunctivae are normal.  EOMI.  PERRLA.  Negative for nystagmus.  Neck:  Neck supple and without nuchal rigidity.  Cardiovascular:  No cyanosis.   Respiratory/Chest:  Effort normal, no respiratory distress.   Musculoskeletal:  Moves all extremities spontaneously and without complaint.  Neuro:  Patient is alert and oriented, ambulated without difficulty or ataxia.  Skin:  Skin is warm and dry, not diaphoretic.  Psych:  Appears to have a normal mood and affect.      ED Course     ED Course     Procedures              Critical Care time:  none               No results found for this or any previous visit (from the past 24 hour(s)).    Medications   dexamethasone PF (DECADRON) injection 10 mg (10 mg Intravenous Given 7/9/18 0247)   lidocaine (PF) (XYLOCAINE) 2 % injection 1 mL (1 mL Other Given 7/9/18 0244)   prochlorperazine (COMPAZINE) injection 10 mg (10 mg Intravenous Given 7/9/18 0318)   diphenhydrAMINE (BENADRYL) injection 25 mg (25 mg Intravenous Given 7/9/18  6550)   0.9% sodium chloride BOLUS (0 mLs Intravenous Stopped 7/9/18 9667)   benztropine mesylate (COGENTIN) injection 1 mg (1 mg Intravenous Given 7/9/18 5901)       Assessments & Plan (with Medical Decision Making)   Darius Bennett is a 28 year old male who presents to the department for evaluation of severe sharp stabbing left-sided headache consistent with his history of cluster headaches.  He has been taking his prevention medication verapamil and also tried intranasal sumatriptan and this evening without relief.  He admits that oxygen therapy has helped in the past but he believes Toradol has led to rebound headaches.  No focal neurologic deficits, recent injury, or infectious symptoms.  Low suspicion for intracranial mass or SAH.  Oxygen was liberally applied but without initial relief.  Subsequently attempted intranasal lidocaine with only mild improvement.  Afterwards he was given IV prochlorperazine and diphenhydramine and his headache completely resolved.  He did develop some anxiousness and restlessness so a milligram of Cogentin was given and symptoms resolved.  Clinical impression is that he likely suffered from one of his recurrent cluster headaches.  Recommend outpatient follow-up with primary care provider for continued management.  Neurology contact information also provided if needed.  He seems comfortable with the discharge plan we discussed including follow up.    Disclaimer:  This note consists of symbols derived from keyboarding, dictation, and/or voice recognition software.  As a result, there may be errors in the script that have gone undetected.  Please consider this when interpreting information found in the chart.        I have reviewed the nursing notes.    I have reviewed the findings, diagnosis, plan and need for follow up with the patient.       Current Discharge Medication List          Final diagnoses:   Intractable episodic cluster headache       7/9/2018   [facility]  EMERGENCY DEPARTMENT     Maco Escobar DO  07/09/18 2688

## 2018-07-09 NOTE — TELEPHONE ENCOUNTER
PA submitted to [facility] PA POOL 7/6/18 by RENE Hammonds    Prior Authorization Retail Medication Request    Medication/Dose: SUMAtriptan (IMITREX) 20 MG/ACT nasal spray  ICD code (if different than what is on RX):    Previously Tried and Failed:  Zolmitriptan 5 mg sol  Rationale:  Per Dr. Hamm: Prior authorization can be started for 2 boxes. Use of more of this medication beyond that should be consulted with and overseen by a neurologist.    Insurance Name:  [employer]  Insurance ID:  [id number]       Pharmacy Information (if different than what is on RX)  Name:    Phone:

## 2018-07-09 NOTE — ED TRIAGE NOTES
Patient states was watching movie and headache came on suddenly. Patient has history of cluster headaches and states pain is at an 9/10. Tried prescribed medications with no relief.

## 2018-07-09 NOTE — TELEPHONE ENCOUNTER
PA Initiation    Medication: sumatriptan intra nasal  Insurance Company: [employer] - Phone [phone] Fax [phone]  Pharmacy Filling the Rx: [place], [place] - [address] [place] [address]  Filling Pharmacy Phone: [phone]  Filling Pharmacy Fax:    Start Date: 7/9/2018    Central Prior Authorization Team   Phone: [phone]

## 2018-07-09 NOTE — ED AVS SNAPSHOT
[facility] Emergency Department    [address] [facility] [address]    Phone:  [phone]    Fax:  [phone]                                       Darius Bennett   MRN: [id number]    Department:  [facility] Emergency Department   Date of Visit:  7/9/2018           Patient Information     Date Of Birth          1990        Your diagnoses for this visit were:     Intractable episodic cluster headache        You were seen by Maco Escobar DO.      Follow-up Information     Follow up with [facility]. Schedule an appointment as soon as possible for a visit in 4 days.    Why:  Followup for reevaluation and managment plan.    Contact information:    [address] [place] [address] [phone]          Call [facility].    Specialty:  Neurology    Why:  As needed.    Contact information:    [address] [place] [address] [phone]    Additional information:    The medical center is located at   [address] [place] [address] (between [facility] and   [place] [address] in [place], four miles north   of [place]).        Discharge Instructions         Cluster Headache  A cluster headache is different from a migraine or a tension headache. A cluster headache starts suddenly, without any warning. The pain can become severe within minutes. The headache is often brief. It can last only 15 minutes. But it can also continue for several hours.  The pain is around a single eye. You may have tears coming from that eye. That eyelid may become droopy. The eye may be red and swollen. You may also have a stuffy or runny nose on the affected side.  You may also have several headaches in one 24-hour period. These may return at the same time of day during the cluster period. A cluster headache ends as suddenly as it began. It often leaves you feeling exhausted for some time afterward.  Cluster headaches often occur at night and during certain times of the  year that are unique to you. A cluster period ranges from a few weeks up to a few months. Then, the headaches may not come back for months or years.  Possible triggers include alcohol and smoking. Even a single alcoholic drink can trigger a headache during the cluster period. Another potential trigger is interrupted sleep patterns. Medicines like nitroglycerin can also cause a cluster headache.  Treatment for cluster headaches varies. Headaches that last only 15 minutes aren t helped by over-the-counter medicines. That s because these medicines take 20 to 30 minutes to start working. Prescription medicines given by injection or as a nasal spray can stop a headache. This is called abortive treatment.  Preventive medicines include steroids and anti-seizure medicines. These can help cut the number of headaches you have during a cluster period. High dose oxygen therapy or a nerve stimulator may shorten each attack and make it less severe. If you have cluster headaches often or have severe symptoms, your healthcare provider may talk with you about surgery. Surgery may be able to stop the nerve that s sending the pain signals. These types of treatments are usually given by a specialist (neurologist or neurosurgeon).  Home care  Follow these tips when caring for yourself at home:    Don t drive yourself home if you were given pain medicine for your headache. Instead, have someone else drive you home. Try to sleep when you get home. You should feel much better when you wake up.    If your healthcare provider gave you preventive medicines, take them as directed. If abortive medicines were prescribed, carry these with you to take at first sign of the headache.    Stick to a regular sleep schedule. Avoid afternoon naps during a cluster period. If you have sleep apnea, talk with your provider about getting treatment for this condition. Sleep apnea greatly interferes with sleep patterns.    Stay away from gas fumes and oil-based  paint fumes.    Avoid drinking alcohol and smoking during a cluster period.    Be cautious when traveling to high altitudes. Higher altitudes have less oxygen. This may trigger a headache.    Use sunglasses to avoid glare and bright lights.    Keep a headache journal. Record the date and time of each headache. Describe the quality of the pain. Note how severe it is, where it is, and how long it lasts. Write down your response to any medicines to control the pain. Note possible triggers: Was it something you ate? Something you did just before the attack? Share this information with your provider to help him or her figure out the best treatment plan for you.    Talk with a counselor or therapist, or join a support group. This may help you cope with the effects of cluster headache on your life.  Follow-up care  Follow up with your healthcare provider, or as advised If you had a CT scan or an MRI, a specialist will review it. You will be told of any new findings that may affect your care.  When to seek medical advice  Call your healthcare provider right away if any of these occur:    Sudden, severe headache, worse than any you have had before    Headache with a fainting spell    Unexplained fever greater than 100.0  F (37.8  C), or as advised    Stiff neck or rash    Weakness in an arm or leg, or on one side of your face    Difficulty speaking    Headaches brought on by physical activity    Changes in your vision    You need to use more pain medicine than normal  Date Last Reviewed: 8/1/2016 2000-2017 [employer]. [address] [place] [address]. All rights reserved. This information is not intended as a substitute for professional medical care. Always follow your healthcare professional's instructions.          24 Hour Appointment Hotline       To make an appointment at any [facility] clinic, call [phone] ([phone]). If you don't have a family doctor or clinic, we will help you  find one. [facility] are conveniently located to serve the needs of you and your family.             Review of your medicines      Our records show that you are taking the medicines listed below. If these are incorrect, please call your family doctor or clinic.        Dose / Directions Last dose taken    cetirizine 10 MG tablet   Commonly known as:  zyrTEC   Dose:  10 mg   Quantity:  90 tablet        Take 1 tablet (10 mg) by mouth every evening   Refills:  3        FISH OIL OMEGA-3 PO        Refills:  0        fluticasone 50 MCG/ACT spray   Commonly known as:  FLONASE   Dose:  1-2 spray   Quantity:  1 Bottle        Spray 1-2 sprays into both nostrils daily   Refills:  11        MULTIVITAMIN ADULT PO        Refills:  0        order for DME   Quantity:  1 each        Equipment being ordered: Oxygen   Refills:  0        SUMAtriptan 20 MG/ACT nasal spray   Commonly known as:  IMITREX   Dose:  1 spray   Quantity:  1 each        Spray 1 spray in nostril as needed for migraine   Refills:  11        triamcinolone 55 MCG/ACT Inhaler   Commonly known as:  NASACORT AQ   Dose:  2 spray   Quantity:  1 Bottle        Spray 2 sprays into both nostrils daily   Refills:  3        verapamil 120 MG Tbcr CR tablet   Commonly known as:  CALAN-SR   Dose:  120 mg   Quantity:  90 tablet        Take 1 tablet (120 mg) by mouth At Bedtime   Refills:  3                Orders Needing Specimen Collection     None      Pending Results     No orders found from 7/7/2018 to 7/10/2018.            Pending Culture Results     No orders found from 7/7/2018 to 7/10/2018.            Pending Results Instructions     If you had any lab results that were not finalized at the time of your Discharge, you can call the ED Lab Result RN at [phone]. You will be contacted by this team for any positive Lab results or changes in treatment. The nurses are available 7 days a week from 10A to 6:30P.  You can leave a message 24 hours per day and they will  "return your call.        Test Results From Your Hospital Stay               Thank you for choosing [facility]       Thank you for choosing [facility] for your care. Our goal is always to provide you with excellent care. Hearing back from our patients is one way we can continue to improve our services. Please take a few minutes to complete the written survey that you may receive in the mail after you visit with us. Thank you!        [employer] Information     [employer] lets you send messages to your doctor, view your test results, renew your prescriptions, schedule appointments and more. To sign up, go to [email or web address] . Click on \"Log in\" on the left side of the screen, which will take you to the Welcome page. Then click on \"Sign up Now\" on the right side of the page.     You will be asked to enter the access code listed below, as well as some personal information. Please follow the directions to create your username and password.     Your access code is: [email or web address]  Expires: 2018 11:01 AM     Your access code will  in 90 days. If you need help or a new code, please call your [facility] clinic or [phone].        Care EveryWhere ID     This is your Care EveryWhere ID. This could be used by other organizations to access your [facility] medical records  [id number]        Equal Access to Services     SHAYNE ZAMORA : Abhijit Childers, waaxda luqadaha, qaybta kaalmada jessica, josh clark. So [facility] [phone].    ATENCIÓN: Si habla español, tiene a scherer disposición servicios gratuitos de asistencia lingüística. Llame al [phone].    We comply with applicable federal civil rights laws and [place] laws. We do not discriminate on the basis of race, color, national origin, age, disability, sex, sexual orientation, or gender identity.            After Visit Summary       This is your record. Keep this with you and show to your community pharmacist(s) and " doctor(s) at your next visit.

## 2018-07-09 NOTE — ED AVS SNAPSHOT
[facility] Emergency Department    [address] [facility] [address]    Phone:  [phone]    Fax:  [phone]                                       Darius Bennett   MRN: [id number]    Department:  [facility] Emergency Department   Date of Visit:  7/9/2018           After Visit Summary Signature Page     I have received my discharge instructions, and my questions have been answered. I have discussed any challenges I see with this plan with the nurse or doctor.    ..........................................................................................................................................  Patient/Patient Representative Signature      ..........................................................................................................................................  Patient Representative Print Name and Relationship to Patient    ..................................................               ................................................  Date                                            Time    ..........................................................................................................................................  Reviewed by Signature/Title    ...................................................              ..............................................  Date                                                            Time

## 2018-07-09 NOTE — ED NOTES
Patient denies any pain at this time and reports shakiness to be better. States that he wants to go home.

## 2018-07-09 NOTE — ED NOTES
Patient states headache is gone with 0/10 for pain but is now feeling shaky and restless. MD informed with new orders received.

## 2018-07-09 NOTE — DISCHARGE INSTRUCTIONS
Cluster Headache  A cluster headache is different from a migraine or a tension headache. A cluster headache starts suddenly, without any warning. The pain can become severe within minutes. The headache is often brief. It can last only 15 minutes. But it can also continue for several hours.  The pain is around a single eye. You may have tears coming from that eye. That eyelid may become droopy. The eye may be red and swollen. You may also have a stuffy or runny nose on the affected side.  You may also have several headaches in one 24-hour period. These may return at the same time of day during the cluster period. A cluster headache ends as suddenly as it began. It often leaves you feeling exhausted for some time afterward.  Cluster headaches often occur at night and during certain times of the year that are unique to you. A cluster period ranges from a few weeks up to a few months. Then, the headaches may not come back for months or years.  Possible triggers include alcohol and smoking. Even a single alcoholic drink can trigger a headache during the cluster period. Another potential trigger is interrupted sleep patterns. Medicines like nitroglycerin can also cause a cluster headache.  Treatment for cluster headaches varies. Headaches that last only 15 minutes aren t helped by over-the-counter medicines. That s because these medicines take 20 to 30 minutes to start working. Prescription medicines given by injection or as a nasal spray can stop a headache. This is called abortive treatment.  Preventive medicines include steroids and anti-seizure medicines. These can help cut the number of headaches you have during a cluster period. High dose oxygen therapy or a nerve stimulator may shorten each attack and make it less severe. If you have cluster headaches often or have severe symptoms, your healthcare provider may talk with you about surgery. Surgery may be able to stop the nerve that s sending the pain  signals. These types of treatments are usually given by a specialist (neurologist or neurosurgeon).  Home care  Follow these tips when caring for yourself at home:    Don t drive yourself home if you were given pain medicine for your headache. Instead, have someone else drive you home. Try to sleep when you get home. You should feel much better when you wake up.    If your healthcare provider gave you preventive medicines, take them as directed. If abortive medicines were prescribed, carry these with you to take at first sign of the headache.    Stick to a regular sleep schedule. Avoid afternoon naps during a cluster period. If you have sleep apnea, talk with your provider about getting treatment for this condition. Sleep apnea greatly interferes with sleep patterns.    Stay away from gas fumes and oil-based paint fumes.    Avoid drinking alcohol and smoking during a cluster period.    Be cautious when traveling to high altitudes. Higher altitudes have less oxygen. This may trigger a headache.    Use sunglasses to avoid glare and bright lights.    Keep a headache journal. Record the date and time of each headache. Describe the quality of the pain. Note how severe it is, where it is, and how long it lasts. Write down your response to any medicines to control the pain. Note possible triggers: Was it something you ate? Something you did just before the attack? Share this information with your provider to help him or her figure out the best treatment plan for you.    Talk with a counselor or therapist, or join a support group. This may help you cope with the effects of cluster headache on your life.  Follow-up care  Follow up with your healthcare provider, or as advised If you had a CT scan or an MRI, a specialist will review it. You will be told of any new findings that may affect your care.  When to seek medical advice  Call your healthcare provider right away if any of these occur:    Sudden, severe headache, worse  than any you have had before    Headache with a fainting spell    Unexplained fever greater than 100.0  F (37.8  C), or as advised    Stiff neck or rash    Weakness in an arm or leg, or on one side of your face    Difficulty speaking    Headaches brought on by physical activity    Changes in your vision    You need to use more pain medicine than normal  Date Last Reviewed: 8/1/2016 2000-2017 [employer]. [address] [place] [address], [place] [address]. All rights reserved. This information is not intended as a substitute for professional medical care. Always follow your healthcare professional's instructions.

## 2018-07-10 NOTE — TELEPHONE ENCOUNTER
Missed that 2 boxes per month is wanted re-submitted request to [employer], new CoverMyMeds Key [id number].

## 2018-07-12 NOTE — TELEPHONE ENCOUNTER
Prior Authorization Approval    Authorization Effective Date: 6/9/2018  Authorization Expiration Date: 7/19/2018  Medication: sumatriptan intra nasal  Approved Dose/Quantity: 2/30  Reference #: [id number]   Insurance Company: [employer] - Phone [phone] Fax [phone]  Which Pharmacy is filling the prescription (Not needed for infusion/clinic administered): [facility] [place] [id number] [place] [address]  Pharmacy Notified: Yes  Patient Notified: Yes

## 2018-09-14 ENCOUNTER — OFFICE VISIT (OUTPATIENT)
Dept: FAMILY MEDICINE | Facility: CLINIC | Age: 28
End: 2018-09-14
Payer: OTHER GOVERNMENT

## 2018-09-14 VITALS
OXYGEN SATURATION: 98 % | DIASTOLIC BLOOD PRESSURE: 82 MMHG | BODY MASS INDEX: 29.22 KG/M2 | SYSTOLIC BLOOD PRESSURE: 121 MMHG | HEART RATE: 70 BPM | WEIGHT: 195 LBS

## 2018-09-14 DIAGNOSIS — B07.0 PLANTAR WART OF LEFT FOOT: Primary | ICD-10-CM

## 2018-09-14 PROCEDURE — 99213 OFFICE O/P EST LOW 20 MIN: CPT | Performed by: FAMILY MEDICINE

## 2018-09-14 NOTE — MR AVS SNAPSHOT
After Visit Summary   9/14/2018    Darius Bennett    MRN: [id number]           Patient Information     Date Of Birth          1990        Visit Information        Provider Department      9/14/2018 8:40 AM Laquita Peres MD [facility]        Today's Diagnoses     Plantar wart of left foot    -  1       Follow-ups after your visit        Additional Services     ORTHO  REFERRAL       [facility] is referring you to the Orthopedic  Services at [facility].       The  Representative will assist you in the coordination of your Orthopedic and Musculoskeletal Care as prescribed by your physician.    The  Representative will call you within 1 business day to help schedule your appointment, or you may contact the  Representative at:    All areas ~ [phone]     Type of Referral : [facility]       Timeframe requested: 3 - 5 days    Patient has a deep calloused wart , will like this removed    Coverage of these services is subject to the terms and limitations of your health insurance plan.  Please call member services at your health plan with any benefit or coverage questions.      If X-rays, CT or MRI's have been performed, please contact the facility where they were done to arrange for , prior to your scheduled appointment.  Please bring this referral request to your appointment and present it to your specialist.                  Who to contact     If you have questions or need follow up information about today's clinic visit or your schedule please contact [facility] directly at [phone].  Normal or non-critical lab and imaging results will be communicated to you by MyChart, letter or phone within 4 business days after the clinic has received the results. If you do not hear from us within 7 days, please contact the clinic through  "MyChart or phone. If you have a critical or abnormal lab result, we will notify you by phone as soon as possible.  Submit refill requests through [employer] (app) or call your pharmacy and they will forward the refill request to us. Please allow 3 business days for your refill to be completed.          Additional Information About Your Visit        To [employer] Information     [employer] (app) lets you send messages to your doctor, view your test results, renew your prescriptions, schedule appointments and more. To sign up, go to [email or web address] ([email or web address]) . Click on \"Log in\" on the left side of the screen, which will take you to the Welcome page. Then click on \"Sign up Now\" on the right side of the page.     You will be asked to enter the access code listed below, as well as some personal information. Please follow the directions to create your username and password.     Your access code is: [email or web address]  Expires: 2018  8:44 AM     Your access code will  in 90 days. If you need help or a new code, please call your [facility] clinic or [phone].        Care EveryWhere ID     This is your Care EveryWhere ID. This could be used by other organizations to access your [facility] medical records  [id number]        Your Vitals Were     Pulse Pulse Oximetry BMI (Body Mass Index)             70 98% 29.22 kg/m2          Blood Pressure from Last 3 Encounters:   18 121/82   18 119/79   18 119/77    Weight from Last 3 Encounters:   18 195 lb (88.5 kg)   18 180 lb (81.6 kg)   18 193 lb (87.5 kg)              We Performed the Following     ORTHO  REFERRAL        Primary Care Provider Office Phone # Fax #    [facility] [phone] [address] [facility] [address]        Equal Access to Services     SHAYNE ZAMORA : Abhijit Childers, mireya luqmarietta, qatony kajosh smith . So [facility] " [phone].    ATENCIÓN: Si eda cee, tiene a scherer disposición servicios gratuitos de asistencia lingüística. Terry herrera [phone].    We comply with applicable federal civil rights laws and [place] laws. We do not discriminate on the basis of race, color, national origin, age, disability, sex, sexual orientation, or gender identity.            Thank you!     Thank you for choosing [facility]  for your care. Our goal is always to provide you with excellent care. Hearing back from our patients is one way we can continue to improve our services. Please take a few minutes to complete the written survey that you may receive in the mail after your visit with us. Thank you!             Your Updated Medication List - Protect others around you: Learn how to safely use, store and throw away your medicines at www.disposemymeds.org.          This list is accurate as of 9/14/18  8:57 AM.  Always use your most recent med list.                   Brand Name Dispense Instructions for use Diagnosis    cetirizine 10 MG tablet    zyrTEC    90 tablet    Take 1 tablet (10 mg) by mouth every evening    Post-nasal drip       FISH OIL OMEGA-3 PO           fluticasone 50 MCG/ACT spray    FLONASE    1 Bottle    Spray 1-2 sprays into both nostrils daily    Post-nasal drip       MULTIVITAMIN ADULT PO           order for DME     1 each    Equipment being ordered: Oxygen    Episodic cluster headache, not intractable       SUMAtriptan 20 MG/ACT nasal spray    IMITREX    1 each    Spray 1 spray in nostril as needed for migraine    Episodic cluster headache, not intractable       triamcinolone 55 MCG/ACT inhaler    NASACORT AQ    1 Bottle    Spray 2 sprays into both nostrils daily    Post-nasal drip       verapamil 120 MG Tbcr CR tablet    CALAN-SR    90 tablet    Take 1 tablet (120 mg) by mouth At Bedtime    Episodic cluster headache, not intractable

## 2018-09-14 NOTE — PROGRESS NOTES
SUBJECTIVE:   Darius Bennett is a 28 year old male who presents to clinic today for the following health issues:      WART(S)  Onset: 3 years    Description:   Location: left foot  Number of warts: 1  Painful: YES    Accompanying Signs & Symptoms:  Signs of infection: no     History:   History of trauma: no   Prior warts: YES    Therapies Tried and outcome: liquid nitrogen and OTC    28 yr old male here for a wart on his left plantar surface. Says he has had this for over two years. It has been frozen once with liquid nitrogen but this did not help. It is causing some pain when he walks.    Problem list and histories reviewed & adjusted, as indicated.  Additional history: as documented    Patient Active Problem List   Diagnosis     Cluster headaches     IBS (irritable bowel syndrome)     Past Surgical History:   Procedure Laterality Date     Compartment release Bilateral 2007    He reports compartment syndrome in both lower legs for which he had surgery       Social History   Substance Use Topics     Smoking status: Never Smoker     Smokeless tobacco: Former User     Alcohol use Yes     Family History   Problem Relation Age of Onset     Diabetes Paternal Grandmother      Kidney Cancer Paternal Grandfather          Current Outpatient Prescriptions   Medication Sig Dispense Refill     cetirizine (ZYRTEC) 10 MG tablet Take 1 tablet (10 mg) by mouth every evening 90 tablet 3     fluticasone (FLONASE) 50 MCG/ACT spray Spray 1-2 sprays into both nostrils daily 1 Bottle 11     Multiple Vitamins-Minerals (MULTIVITAMIN ADULT PO)        Omega-3 Fatty Acids (FISH OIL OMEGA-3 PO)        order for DME Equipment being ordered: Oxygen 1 each 0     SUMAtriptan (IMITREX) 20 MG/ACT nasal spray Spray 1 spray in nostril as needed for migraine 1 each 11     triamcinolone (NASACORT AQ) 55 MCG/ACT Inhaler Spray 2 sprays into both nostrils daily 1 Bottle 3     verapamil (CALAN-SR) 120 MG TBCR CR tablet Take 1 tablet (120 mg) by  mouth At Bedtime 90 tablet 3     No Known Allergies  BP Readings from Last 3 Encounters:   09/14/18 121/82   07/09/18 119/79   06/19/18 119/77    Wt Readings from Last 3 Encounters:   09/14/18 195 lb (88.5 kg)   07/09/18 180 lb (81.6 kg)   06/19/18 193 lb (87.5 kg)                  Labs reviewed in EPIC    Reviewed and updated as needed this visit by clinical staff       Reviewed and updated as needed this visit by Provider         ROS:  Constitutional, HEENT, cardiovascular, pulmonary, gi and gu systems are negative, except as otherwise noted.    OBJECTIVE:     /82 (BP Location: Right arm, Patient Position: Chair, Cuff Size: Adult Regular)  Pulse 70  Wt 195 lb (88.5 kg)  SpO2 98%  BMI 29.22 kg/m2  Body mass index is 29.22 kg/(m^2).  GENERAL: healthy, alert and no distress  SKIN: one deep calloused  wart - foot left    Diagnostic Test Results:  none     ASSESSMENT/PLAN:       1. Plantar wart of left foot  Due to the fact that this wart is quite deep , referred to podiatry for removal   - ORTHO  REFERRAL    FUTURE APPOINTMENTS:       - Follow-up visit as needed.    Laquita Peres MD  [facility]

## 2018-09-14 NOTE — NURSING NOTE
"Initial /82 (BP Location: Right arm, Patient Position: Chair, Cuff Size: Adult Regular)  Pulse 70  Wt 195 lb (88.5 kg)  SpO2 98%  BMI 29.22 kg/m2 Estimated body mass index is 29.22 kg/(m^2) as calculated from the following:    Height as of 6/19/18: 5' 8.5\" (1.74 m).    Weight as of this encounter: 195 lb (88.5 kg). .    Fabienne Alcaraz    "

## 2018-09-17 ENCOUNTER — TELEPHONE (OUTPATIENT)
Dept: FAMILY MEDICINE | Facility: CLINIC | Age: 28
End: 2018-09-17

## 2018-09-17 NOTE — TELEPHONE ENCOUNTER
Reason for Call:  Referral faxed    Detailed comments: patient is calling and stating that he needs his Podiatry Referral faxed to [employer] at [phone]    Phone Number Patient can be reached at: Home number on file [phone] (home)    Best Time: any    Can we leave a detailed message on this number? YES   Patricia Lund  [facility]  [facility]      Call taken on 9/17/2018 at 12:10 PM by Patricia Lund

## 2018-09-19 NOTE — TELEPHONE ENCOUNTER
Patient is calling us back stating his insurance company does not accept faxes anymore for his referral, instead we need to go to [facility]. Come and go to the provider tab page to submit this referral.  Patricia Lund  [facility]inic [facility]  [facility]

## 2018-09-19 NOTE — TELEPHONE ENCOUNTER
Submitted referral via [facility] notified this was completed. He will check status. Printed out referral. Will send to HIM for scanning.      Felicia CABRERA RN

## 2019-03-01 ENCOUNTER — OFFICE VISIT (OUTPATIENT)
Dept: FAMILY MEDICINE | Facility: CLINIC | Age: 29
End: 2019-03-01
Payer: OTHER GOVERNMENT

## 2019-03-01 VITALS
HEART RATE: 73 BPM | TEMPERATURE: 97.8 F | WEIGHT: 201.6 LBS | RESPIRATION RATE: 16 BRPM | SYSTOLIC BLOOD PRESSURE: 110 MMHG | OXYGEN SATURATION: 98 % | HEIGHT: 69 IN | DIASTOLIC BLOOD PRESSURE: 70 MMHG | BODY MASS INDEX: 29.86 KG/M2

## 2019-03-01 DIAGNOSIS — R00.2 PALPITATIONS: Primary | ICD-10-CM

## 2019-03-01 LAB
ANION GAP SERPL CALCULATED.3IONS-SCNC: 6 MMOL/L (ref 3–14)
BUN SERPL-MCNC: 22 MG/DL (ref 7–30)
CALCIUM SERPL-MCNC: 9 MG/DL (ref 8.5–10.1)
CHLORIDE SERPL-SCNC: 106 MMOL/L (ref 94–109)
CO2 SERPL-SCNC: 28 MMOL/L (ref 20–32)
CREAT SERPL-MCNC: 1.15 MG/DL (ref 0.66–1.25)
GFR SERPL CREATININE-BSD FRML MDRD: 85 ML/MIN/{1.73_M2}
GLUCOSE SERPL-MCNC: 104 MG/DL (ref 70–99)
MAGNESIUM SERPL-MCNC: 2.3 MG/DL (ref 1.6–2.3)
POTASSIUM SERPL-SCNC: 3.8 MMOL/L (ref 3.4–5.3)
SODIUM SERPL-SCNC: 140 MMOL/L (ref 133–144)
TSH SERPL DL<=0.005 MIU/L-ACNC: 0.56 MU/L (ref 0.4–4)

## 2019-03-01 PROCEDURE — 93000 ELECTROCARDIOGRAM COMPLETE: CPT | Performed by: INTERNAL MEDICINE

## 2019-03-01 PROCEDURE — 99214 OFFICE O/P EST MOD 30 MIN: CPT | Performed by: INTERNAL MEDICINE

## 2019-03-01 PROCEDURE — 83735 ASSAY OF MAGNESIUM: CPT | Performed by: INTERNAL MEDICINE

## 2019-03-01 PROCEDURE — 36415 COLL VENOUS BLD VENIPUNCTURE: CPT | Performed by: INTERNAL MEDICINE

## 2019-03-01 PROCEDURE — 84443 ASSAY THYROID STIM HORMONE: CPT | Performed by: INTERNAL MEDICINE

## 2019-03-01 PROCEDURE — 80048 BASIC METABOLIC PNL TOTAL CA: CPT | Performed by: INTERNAL MEDICINE

## 2019-03-01 ASSESSMENT — PAIN SCALES - GENERAL: PAINLEVEL: NO PAIN (0)

## 2019-03-01 ASSESSMENT — MIFFLIN-ST. JEOR: SCORE: 1869.83

## 2019-03-01 NOTE — PROGRESS NOTES
"  SUBJECTIVE:   Darius Bennett is a 29 year old male who presents to clinic today for the following health issues:      Chief Complaint   Patient presents with     Patient Request     ? heart palpations \"pt can feel heart pounding\" X 3 weeks comes and goes. pt. is very active goes to the gym and the last week when at the gym noticed it and seemed out of shape. pt. notes it seems worse with alcohol and caffeine.        Darius has noticed heart pounding when just sitting, felt fast but he checked his HR and it was normal.  This sometimes lasts for hours.  Happens daily.  Very SOB during recent exercise.  He feels like his breathing is \"off\" during the episodes.  No chest pain, dizziness.  No history of heart issues.  He hasn't had more stress recently.  Has had more parties recently, so has been drinking more than usual.  Hasn't noticed urine being more concentrated or lightheadedness with position change.     Problem list and histories reviewed & adjusted, as indicated.  Additional history: as documented    Patient Active Problem List   Diagnosis     Cluster headaches     IBS (irritable bowel syndrome)     Past Surgical History:   Procedure Laterality Date     Compartment release Bilateral 2007    He reports compartment syndrome in both lower legs for which he had surgery       Social History     Tobacco Use     Smoking status: Never Smoker     Smokeless tobacco: Former User   Substance Use Topics     Alcohol use: Yes     Family History   Problem Relation Age of Onset     Diabetes Paternal Grandmother      Kidney Cancer Paternal Grandfather          Current Outpatient Medications   Medication Sig Dispense Refill     Multiple Vitamins-Minerals (MULTIVITAMIN ADULT PO)        Omega-3 Fatty Acids (FISH OIL OMEGA-3 PO)        cetirizine (ZYRTEC) 10 MG tablet Take 1 tablet (10 mg) by mouth every evening (Patient not taking: Reported on 3/1/2019) 90 tablet 3     fluticasone (FLONASE) 50 MCG/ACT spray Spray 1-2 sprays " "into both nostrils daily (Patient not taking: Reported on 3/1/2019) 1 Bottle 11     order for DME Equipment being ordered: Oxygen 1 each 0     SUMAtriptan (IMITREX) 20 MG/ACT nasal spray Spray 1 spray in nostril as needed for migraine (Patient not taking: Reported on 3/1/2019) 1 each 11     triamcinolone (NASACORT AQ) 55 MCG/ACT Inhaler Spray 2 sprays into both nostrils daily (Patient not taking: Reported on 3/1/2019) 1 Bottle 3     verapamil (CALAN-SR) 120 MG TBCR CR tablet Take 1 tablet (120 mg) by mouth At Bedtime (Patient not taking: Reported on 3/1/2019) 90 tablet 3     No Known Allergies    Reviewed and updated as needed this visit by clinical staff  Tobacco  Allergies  Meds  Med Hx  Surg Hx  Fam Hx  Soc Hx      Reviewed and updated as needed this visit by Provider         ROS:  Constitutional, cardiovascular systems are negative, except as otherwise noted.    OBJECTIVE:     /70   Pulse 73   Temp 97.8  F (36.6  C) (Tympanic)   Resp 16   Ht 1.753 m (5' 9\")   Wt 91.4 kg (201 lb 9.6 oz)   SpO2 98%   BMI 29.77 kg/m    Body mass index is 29.77 kg/m .  GENERAL: healthy, alert and no distress  NECK: no adenopathy, no asymmetry, masses, or scars and thyroid normal to palpation  RESP: lungs clear to auscultation - no rales, rhonchi or wheezes  CV: regular rate and rhythm, normal S1 S2, no S3 or S4, no murmur, click or rub, no peripheral edema    Diagnostic Test Results:  EKG - nondiagnostic RSR in V1, otherwise normal    ASSESSMENT/PLAN:       1. Palpitations    Darius presents with 3 weeks of the sensation of strong heart beats- when he checks his pulse during these times, it has been normal, which is reassuring and therefore likely represents a benign etiology.  Discussed possible dehydration as he has noticed it is worse when drinking alcohol.  Denies significant stressors that may be leading to an anxiety etiology.  EKG today was unremarkable.  Will check labs as below.  Discussed Holter " monitoring for additional reassurance, but he says he may just continue to monitor if labs are normal, which I think is reasonable.  Certainly proceed with Holter if he records any rapid HRs at home.  Follow-up as needed with any new symptoms.     - TSH with free T4 reflex  - Magnesium  - Basic metabolic panel  - EKG 12-lead complete w/read - Clinics  - Holter Monitor 48 hour Adult Pediatric; Future      MounaJessie Law MD  [facility]

## 2019-03-01 NOTE — LETTER
[facility]  [address] [place] [address]  Phone: [phone]    March 1, 2019        Darius Bennett  [address] [place] [address]          Dear Darius,      Your labs are normal.  Blood sugar just slightly elevated, but this sample was likely not done fasting, so the level is fine for a non-fasting sample.       Component      Latest Ref Rng & Units 3/1/2019   Sodium      133 - 144 mmol/L 140   Potassium      3.4 - 5.3 mmol/L 3.8   Chloride      94 - 109 mmol/L 106   Carbon Dioxide      20 - 32 mmol/L 28   Anion Gap      3 - 14 mmol/L 6   Glucose      70 - 99 mg/dL 104 (H)   Urea Nitrogen      7 - 30 mg/dL 22   Creatinine      0.66 - 1.25 mg/dL 1.15   GFR Estimate      >60 mL/min/1.73:m2 85   GFR Estimate If Black      >60 mL/min/1.73:m2 >90   Calcium      8.5 - 10.1 mg/dL 9.0   TSH      0.40 - 4.00 mU/L 0.56   Magnesium      1.6 - 2.3 mg/dL 2.3         Sincerely,        Richa Law MD /  cma

## 2019-04-26 ENCOUNTER — OFFICE VISIT (OUTPATIENT)
Dept: FAMILY MEDICINE | Facility: CLINIC | Age: 29
End: 2019-04-26
Payer: OTHER GOVERNMENT

## 2019-04-26 ENCOUNTER — ANCILLARY PROCEDURE (OUTPATIENT)
Dept: GENERAL RADIOLOGY | Facility: CLINIC | Age: 29
End: 2019-04-26
Attending: NURSE PRACTITIONER
Payer: OTHER GOVERNMENT

## 2019-04-26 VITALS
TEMPERATURE: 97.6 F | RESPIRATION RATE: 16 BRPM | BODY MASS INDEX: 28.44 KG/M2 | SYSTOLIC BLOOD PRESSURE: 136 MMHG | WEIGHT: 192 LBS | HEIGHT: 69 IN | OXYGEN SATURATION: 96 % | HEART RATE: 70 BPM | DIASTOLIC BLOOD PRESSURE: 78 MMHG

## 2019-04-26 DIAGNOSIS — S29.011A STRAIN OF LEFT PECTORALIS MUSCLE, INITIAL ENCOUNTER: Primary | ICD-10-CM

## 2019-04-26 DIAGNOSIS — S29.011A STRAIN OF LEFT PECTORALIS MUSCLE, INITIAL ENCOUNTER: ICD-10-CM

## 2019-04-26 PROCEDURE — 71046 X-RAY EXAM CHEST 2 VIEWS: CPT

## 2019-04-26 PROCEDURE — 99214 OFFICE O/P EST MOD 30 MIN: CPT | Performed by: NURSE PRACTITIONER

## 2019-04-26 ASSESSMENT — MIFFLIN-ST. JEOR: SCORE: 1826.29

## 2019-04-26 NOTE — PATIENT INSTRUCTIONS
Set up appointment for recheck in 7-10 days.    Try to not keep feeling or pressing on this area as it can actually become tender or red with repeated palpation.    Follow-up with your primary care provider next week and as needed.    Indications for emergent return to emergency department discussed with patient, who verbalized good understanding and agreement.  Patient understands the limitations of today's evaluation.

## 2019-04-26 NOTE — NURSING NOTE
"Chief Complaint   Patient presents with     Mass     lump on chest       Initial /78 (BP Location: Right arm, Patient Position: Chair, Cuff Size: Adult Large)   Pulse 70   Temp 97.6  F (36.4  C) (Tympanic)   Resp 16   Ht 1.753 m (5' 9\")   Wt 87.1 kg (192 lb)   SpO2 96%   BMI 28.35 kg/m   Estimated body mass index is 28.35 kg/m  as calculated from the following:    Height as of this encounter: 1.753 m (5' 9\").    Weight as of this encounter: 87.1 kg (192 lb).    Patient presents to the clinic using No DME    Health Maintenance that is potentially due pending provider review:  NONE    n/a    Is there anyone who you would like to be able to receive your results? No  If yes have patient fill out YUSRA    "

## 2019-04-26 NOTE — PROGRESS NOTES
SUBJECTIVE:   Darius Bennett is a 29 year old male who presents to clinic today for the following   health issues:      Lump on Chest      Duration: 1 day    Description (location/character/radiation): noticed yesterday a small lump on left side of chest     Intensity:  mild    Accompanying signs and symptoms: can have discomfort at site of lump    History (similar episodes/previous evaluation): None    Precipitating or alleviating factors: None    Therapies tried and outcome: None           Additional history: as documented    Reviewed  and updated as needed this visit by clinical staff  Tobacco  Allergies  Meds  Problems  Med Hx  Surg Hx  Fam Hx  Soc Hx          Reviewed and updated as needed this visit by Provider  Tobacco  Allergies  Meds  Problems  Med Hx  Surg Hx  Fam Hx         Patient Active Problem List   Diagnosis     Cluster headaches     IBS (irritable bowel syndrome)     Past Surgical History:   Procedure Laterality Date     Compartment release Bilateral 2007    He reports compartment syndrome in both lower legs for which he had surgery       Social History     Tobacco Use     Smoking status: Never Smoker     Smokeless tobacco: Former User   Substance Use Topics     Alcohol use: Yes     Family History   Problem Relation Age of Onset     Diabetes Paternal Grandmother      Kidney Cancer Paternal Grandfather          Current Outpatient Medications   Medication Sig Dispense Refill     Multiple Vitamins-Minerals (MULTIVITAMIN ADULT PO)        Omega-3 Fatty Acids (FISH OIL OMEGA-3 PO)        order for DME Equipment being ordered: Oxygen 1 each 0     SUMAtriptan (IMITREX) 20 MG/ACT nasal spray Spray 1 spray in nostril as needed for migraine 1 each 11     verapamil (CALAN-SR) 120 MG TBCR CR tablet Take 1 tablet (120 mg) by mouth At Bedtime 90 tablet 3     cetirizine (ZYRTEC) 10 MG tablet Take 1 tablet (10 mg) by mouth every evening (Patient not taking: Reported on 3/1/2019) 90 tablet 3      "fluticasone (FLONASE) 50 MCG/ACT spray Spray 1-2 sprays into both nostrils daily (Patient not taking: Reported on 3/1/2019) 1 Bottle 11     triamcinolone (NASACORT AQ) 55 MCG/ACT Inhaler Spray 2 sprays into both nostrils daily (Patient not taking: Reported on 3/1/2019) 1 Bottle 3     No Known Allergies  Labs reviewed in EPIC    ROS:  Constitutional, HEENT, cardiovascular, pulmonary, GI, , musculoskeletal, neuro, skin, endocrine and psych systems are negative, except as otherwise noted.    OBJECTIVE:     /78 (BP Location: Right arm, Patient Position: Chair, Cuff Size: Adult Large)   Pulse 70   Temp 97.6  F (36.4  C) (Tympanic)   Resp 16   Ht 1.753 m (5' 9\")   Wt 87.1 kg (192 lb)   SpO2 96%   BMI 28.35 kg/m    Body mass index is 28.35 kg/m .   GENERAL: healthy, alert and no distress, nontoxic in appearance  EYES: Eyes grossly normal to inspection, PERRL and conjunctivae and sclerae normal  HENT: normocephalic  NECK:supple with full ROM  RESP: lungs clear to auscultation - no rales, rhonchi or wheezes  CV: regular rate and rhythm, normal S1 S2, no S3 or S4, no murmur, click or rub, no peripheral edema   ABDOMEN: soft, nontender, no hepatosplenomegaly, no masses and bowel sounds normal  MS: no gross musculoskeletal defects noted, no edema  Left pectoral muscle has area of linear firmness across the top. Had Dr. Mathews feel it as well as it does not feel like a mass. Xray is normal.    Diagnostic Test Results:CHEST TWO VIEWS 4/26/2019 3:45 PM      HISTORY: Strain of left pectoralis muscle, initial encounter     COMPARISON: None.      FINDINGS: There are no acute infiltrates. The cardiac silhouette is  not enlarged. Pulmonary vasculature is unremarkable.                                                                      IMPRESSION: No acute disease.     NORI TAN MD  No results found for this or any previous visit (from the past 24 hour(s)).    ASSESSMENT/PLAN:   Watchful waiting and recheck in 1 week " for further evaluation and recheck. Try to not keep touching it as this can irritate the area. Dr. Mathews palpated as well and was wondering if it could be a rib that was slightly displaced due to bench pressing up to 250 pounds.  Problem List Items Addressed This Visit     None      Visit Diagnoses     Strain of left pectoralis muscle, initial encounter    -  Primary    Relevant Orders    XR Chest 2 Views (Completed)               Patient Instructions   Set up appointment for recheck in 7-10 days.    Try to not keep feeling or pressing on this area as it can actually become tender or red with repeated palpation.    Follow-up with your primary care provider next week and as needed.    Indications for emergent return to emergency department discussed with patient, who verbalized good understanding and agreement.  Patient understands the limitations of today's evaluation.             JEN [facility]

## 2019-05-03 ENCOUNTER — OFFICE VISIT (OUTPATIENT)
Dept: FAMILY MEDICINE | Facility: CLINIC | Age: 29
End: 2019-05-03
Payer: OTHER GOVERNMENT

## 2019-05-03 VITALS
TEMPERATURE: 97 F | SYSTOLIC BLOOD PRESSURE: 122 MMHG | RESPIRATION RATE: 18 BRPM | WEIGHT: 189 LBS | BODY MASS INDEX: 27.99 KG/M2 | DIASTOLIC BLOOD PRESSURE: 70 MMHG | HEART RATE: 76 BPM | HEIGHT: 69 IN

## 2019-05-03 DIAGNOSIS — N63.0 LUMP OR MASS IN BREAST: Primary | ICD-10-CM

## 2019-05-03 DIAGNOSIS — R22.2 LUMP IN CHEST: ICD-10-CM

## 2019-05-03 DIAGNOSIS — S86.892A LEFT MEDIAL TIBIAL STRESS SYNDROME, INITIAL ENCOUNTER: ICD-10-CM

## 2019-05-03 PROCEDURE — 99213 OFFICE O/P EST LOW 20 MIN: CPT | Performed by: NURSE PRACTITIONER

## 2019-05-03 ASSESSMENT — MIFFLIN-ST. JEOR: SCORE: 1812.68

## 2019-05-03 NOTE — PATIENT INSTRUCTIONS
If lump dose not resolve over the next 1 week Contact [phone] to schedule your ultra sound.    Will have you start Physical Therapy.       Patient Education     Shin Splints (Medial Tibial Stress Syndrome)  Pain felt in the front of your lower leg is often called  shin splints.  One common cause of this pain is tendinitis. This is the inflammation of tendons. These are the tough, cordlike bands of tissue that connect muscle to bone. When the tendons of the muscles near the shinbone (tibia) become inflamed, the pain is felt along the shin. Shin splints often affect athletes and runners and are commonly due to overuse. A less common cause is flat feet with low arches.  Symptoms of shin splints  Symptoms of shin splints often start as a dull ache that gets worse over time. Pain may also be sharp or stabbing. Resting your legs often relieves the symptoms. Pain may occur both during or after activity. Later, the pain may become continuous with almost any activity.  Your evaluation  Your healthcare provider will ask you questions about your activities and your health history. Tell your provider about possible injuries. The diagnosis is often made through the history and physical exam. There are no tests for shin splints. But your provider may want to do some tests to rule out a stress fracture in your shinbone. These tests may include an X-ray, bone scan, or MRI.  Treating shin splints    Follow these and any other instructions you are given.    Rest. Cut down on running and high-impact sports. Or stop doing these things completely to let your legs rest and the injury heal.    Ice. Put ice on the painful areas. Ice for 15 minutes every 3 hours. To make an ice pack, put ice cubes in a plastic bag that seals at the top. Wrap the bag in a clean, thin towel or cloth. Never put ice or an ice pack directly on the skin.    Medicines. Take nonsteroidal anti-inflammatory medicines (NSAIDs), such as ibuprofen, as directed by  your healthcare provider.  Preventing shin splints  To help prevent shin splints in the future:    Warm up before you run. Do gentle calf-stretching exercises.    Be careful not to overtrain.    Don't run on hard or uneven surfaces.    If you have flat feet or low arches, consider orthotics or insoles for correction.  Use running shoes with good support and cushioned soles. Replace old or worn shoes.  Date Last Reviewed: 5/1/2018 2000-2018 [employer]. [address] [place], [place] [id number]. All rights reserved. This information is not intended as a substitute for professional medical care. Always follow your healthcare professional's instructions.

## 2019-05-03 NOTE — PROGRESS NOTES
SUBJECTIVE:   Darius Bennett is a 29 year old male who presents to clinic today for the following   health issues:      ED/UC Followup:    Facility:  [facility]  Date of visit: 4/26/19  Reason for visit: strain of left pectoralis  Current Status: Patient states the lump is still on his chest. It has not changed. It is not painful.           Additional history: as documented    Reviewed  and updated as needed this visit by clinical staff         Reviewed and updated as needed this visit by Provider         Patient Active Problem List   Diagnosis     Cluster headaches     IBS (irritable bowel syndrome)     Past Surgical History:   Procedure Laterality Date     Compartment release Bilateral 2007    He reports compartment syndrome in both lower legs for which he had surgery       Social History     Tobacco Use     Smoking status: Never Smoker     Smokeless tobacco: Former User   Substance Use Topics     Alcohol use: Yes     Family History   Problem Relation Age of Onset     Diabetes Paternal Grandmother      Kidney Cancer Paternal Grandfather          Current Outpatient Medications   Medication Sig Dispense Refill     Multiple Vitamins-Minerals (MULTIVITAMIN ADULT PO)        Omega-3 Fatty Acids (FISH OIL OMEGA-3 PO)        order for DME Equipment being ordered: Oxygen 1 each 0     SUMAtriptan (IMITREX) 20 MG/ACT nasal spray Spray 1 spray in nostril as needed for migraine 1 each 11     verapamil (CALAN-SR) 120 MG TBCR CR tablet Take 1 tablet (120 mg) by mouth At Bedtime 90 tablet 3     No Known Allergies  Recent Labs   Lab Test 03/01/19  0853 12/19/17  0921   ALT  --  39   CR 1.15 1.28*   GFRESTIMATED 85 67   GFRESTBLACK >90 81   POTASSIUM 3.8 3.8   TSH 0.56 0.69      BP Readings from Last 3 Encounters:   05/03/19 122/70   04/26/19 136/78   03/01/19 110/70    Wt Readings from Last 3 Encounters:   05/03/19 85.7 kg (189 lb)   04/26/19 87.1 kg (192 lb)   03/01/19 91.4 kg (201 lb 9.6 oz)                 "    ROS:  Constitutional, HEENT, cardiovascular, pulmonary, gi and gu systems are negative, except as otherwise noted.    OBJECTIVE:     /70 (BP Location: Right arm, Cuff Size: Adult Large)   Pulse 76   Temp 97  F (36.1  C) (Tympanic)   Resp 18   Ht 1.753 m (5' 9\")   Wt 85.7 kg (189 lb)   BMI 27.91 kg/m    Body mass index is 27.91 kg/m .  GENERAL: healthy, alert and no distress  EYES: Eyes grossly normal to inspection, PERRL and conjunctivae and sclerae normal  NECK: no adenopathy, no asymmetry, masses, or scars and thyroid normal to palpation  RESP: lungs clear to auscultation - no rales, rhonchi or wheezes  BREAST: mass left breast/chest close to left sternum   CV: regular rate and rhythm, normal S1 S2, no S3 or S4, no murmur, click or rub, no peripheral edema and peripheral pulses strong  MS: Tenderness to the left calf area with fixation and extension  SKIN: no suspicious lesions or rashes  NEURO: Normal strength and tone, mentation intact and speech normal  PSYCH: mentation appears normal, affect normal/bright      ASSESSMENT/PLAN:   (N63.0) Lump or mass in breast  (primary encounter diagnosis)  Comment: We will obtain ultrasound.  Plan: US Chest Wall        (R22.2) Lump in chest  Comment:   Plan: , US Chest Wall      (S86.892A) Left medial tibial stress syndrome, initial encounter  Comment: Patient is training for marathon.  Has noticed questionable shinsplints.  Will have patient follow-up with physical therapy for treatment due to his active sport history and training for marathon  Plan: PHYSICAL THERAPY REFERRAL, CANCELED: US Chest         Wall       JEN [facility]      "

## 2019-05-14 ENCOUNTER — HOSPITAL ENCOUNTER (OUTPATIENT)
Dept: ULTRASOUND IMAGING | Facility: CLINIC | Age: 29
Discharge: HOME | End: 2019-05-14
Attending: NURSE PRACTITIONER | Admitting: NURSE PRACTITIONER
Payer: OTHER GOVERNMENT

## 2019-05-14 DIAGNOSIS — N63.0 LUMP OR MASS IN BREAST: ICD-10-CM

## 2019-05-14 DIAGNOSIS — R22.2 LUMP IN CHEST: ICD-10-CM

## 2019-05-14 PROCEDURE — 76642 ULTRASOUND BREAST LIMITED: CPT | Mod: LT

## 2019-05-14 NOTE — RESULT ENCOUNTER NOTE
Please Notify Darius  of test results Ultra Sound negative.  Continue to monitor if not resolved or  further concerns return.    Katy Barragan CNP

## 2019-05-14 NOTE — LETTER
Darius Bennett  [address] [facility] [address]      May 14, 2019  Date of Exam: 5/14/19    Dear Darius:    Thank you for your recent visit.  Breast Imaging Result: We are pleased to inform you that the results of your recent breast imaging show no evidence of malignancy (cancer).    If you are experiencing any breast problems such as a lump or localized pain we request that you discuss this with your health care provider if you haven t already done so, as additional testing may be necessary.    As you know, early detection of cancer is very important. Although mammography is the most accurate method for early detection, not all cancers are found through mammography. A thorough examination includes a combination of mammography, physical examination and breast self-examination. Currently the American College of Radiology and the Society of Breast Imaging recommend an annual mammogram for all women beginning at the age of 40.    A report of your breast imaging results was sent to: Katy Barragan    Your breast imaging will become part of your medical file here at [facility] for at least 10 years. You are responsible for informing any new health care provider or breast imaging facility of the date and location of this examination.    We appreciate the opportunity to participate in your health care.    Sincerely,    Kevin Sawyer MD  Interpreting Radiologist  [facility]

## 2019-07-31 ENCOUNTER — OFFICE VISIT (OUTPATIENT)
Dept: FAMILY MEDICINE | Facility: CLINIC | Age: 29
End: 2019-07-31
Payer: OTHER GOVERNMENT

## 2019-07-31 VITALS
OXYGEN SATURATION: 97 % | SYSTOLIC BLOOD PRESSURE: 128 MMHG | BODY MASS INDEX: 28.14 KG/M2 | WEIGHT: 190 LBS | HEART RATE: 62 BPM | DIASTOLIC BLOOD PRESSURE: 81 MMHG | RESPIRATION RATE: 16 BRPM | HEIGHT: 69 IN | TEMPERATURE: 97.7 F

## 2019-07-31 DIAGNOSIS — R20.0 NUMBNESS OF FINGERS OF BOTH HANDS: ICD-10-CM

## 2019-07-31 DIAGNOSIS — R20.2 PARESTHESIA: Primary | ICD-10-CM

## 2019-07-31 PROCEDURE — 99213 OFFICE O/P EST LOW 20 MIN: CPT | Performed by: NURSE PRACTITIONER

## 2019-07-31 ASSESSMENT — MIFFLIN-ST. JEOR: SCORE: 1817.21

## 2019-07-31 NOTE — PATIENT INSTRUCTIONS
"Set up Occupational Therapy appointment    Follow up with your primary care provider in the next 2 weeks.    Follow-up with your primary care provider next week and as needed.    Indications for emergent return to emergency department discussed with patient, who verbalized good understanding and agreement.  Patient understands the limitations of today's evaluation.         Patient Education     Paraesthesias  Paraesthesia is a burning or prickling sensation that is sometimes felt in the hands, arms, legs or feet. It can also occur in other parts of the body. It can also feel like tingling or numbness, skin crawling, or itching. The feeling is not comfortable, but it is not painful. (The \"pins and needles\" feeling that happens when a foot or hand \"falls asleep\" is a temporary paraesthesia.)  Paraesthesias that last or come and go may be caused by medical issues that need to be treated. These include stroke, a bulging disk pressing on a nerve, a trapped nerve, vitamin deficiencies, uncontrolled diabetes, alcohol abuse, or even certain medicines.  Tests are often done. These tests may include blood tests, X-ray, CT (computerized tomography) scan, nerve conduction studies (NCS), or a muscle test (electromyography). Depending on the cause, treatment may include physical therapy.  Home care    Tell your healthcare provider about all medicines you take. This includes prescription and over-the-counter medicines, vitamins, and herbs. Ask if any of the medicines may be causing your problems. Don't make any changes to prescription medicines without talking to your healthcare provider first.    You may be prescribed medicines to help relieve the tingling feeling or for pain. Take all medicines as directed.    A numb hand or foot may be more prone to injury. To help protect it:  ? Always use oven mitts.  ? Test water with an unaffected hand or foot.  ? Use caution when trimming nails. File sharp areas.  ? Wear shoes that fit " well to avoid pressure points, blisters, and ulcers.  ? Inspect your hands and feet carefully (including the soles of your feet and between your toes) daily. If you see red areas, sores, or other problems, tell your healthcare provider.  Follow-up care  Follow up with your doctor, or as advised. You may need further testing or evaluation.  When to seek medical advice  Call your healthcare provider right away if any of the following occur:    Numbness or weakness of the face, one arm, or one leg    Slurred speech, confusion, trouble speaking, walking, or seeing    Severe headache, fainting spell, dizziness, or seizure    Chest, arm, neck, or upper back pain    Loss of bladder or bowel control    Open wound with redness, swelling, or pus  Date Last Reviewed: 4/1/2018 2000-2018 [employer]. [address] [place] [address]. All rights reserved. This information is not intended as a substitute for professional medical care. Always follow your healthcare professional's instructions.

## 2019-07-31 NOTE — NURSING NOTE
"Chief Complaint   Patient presents with     Tingling       Initial /81   Pulse 62   Temp 97.7  F (36.5  C) (Tympanic)   Resp 16   Ht 1.753 m (5' 9\")   Wt 86.2 kg (190 lb)   SpO2 97%   BMI 28.06 kg/m   Estimated body mass index is 28.06 kg/m  as calculated from the following:    Height as of this encounter: 1.753 m (5' 9\").    Weight as of this encounter: 86.2 kg (190 lb).    Patient presents to the clinic using No DME    Health Maintenance that is potentially due pending provider review:  NONE    n/a    Is there anyone who you would like to be able to receive your results? No  If yes have patient fill out [facility]YS[facility]      "

## 2019-07-31 NOTE — PROGRESS NOTES
Subjective     Darius Bennett is a 29 year old male who presents to clinic today for the following health issues:    HPI   Numbness       Duration: 1 month     Description (location/character/radiation): both hands     Intensity:  mild, moderate    Accompanying signs and symptoms: numbness and tingling, feels weird when he touches things, no known injury. Has been riding bike due to training for a triathlon, no pain.     History (similar episodes/previous evaluation): training for a short triathlon     Precipitating or alleviating factors: None    Therapies tried and outcome: ibuprofen          Patient Active Problem List   Diagnosis     Cluster headaches     IBS (irritable bowel syndrome)     Past Surgical History:   Procedure Laterality Date     Compartment release Bilateral 2007    He reports compartment syndrome in both lower legs for which he had surgery       Social History     Tobacco Use     Smoking status: Never Smoker     Smokeless tobacco: Former User   Substance Use Topics     Alcohol use: Yes     Family History   Problem Relation Age of Onset     Diabetes Paternal Grandmother      Kidney Cancer Paternal Grandfather          Current Outpatient Medications   Medication Sig Dispense Refill     Multiple Vitamins-Minerals (MULTIVITAMIN ADULT PO)        Omega-3 Fatty Acids (FISH OIL OMEGA-3 PO)        SUMAtriptan (IMITREX) 20 MG/ACT nasal spray Spray 1 spray in nostril as needed for migraine 1 each 11     verapamil (CALAN-SR) 120 MG TBCR CR tablet Take 1 tablet (120 mg) by mouth At Bedtime (Patient taking differently: Take 120 mg by mouth as needed ) 90 tablet 3     order for DME Equipment being ordered: Oxygen (Patient not taking: Reported on 7/31/2019) 1 each 0     No Known Allergies      Reviewed and updated as needed this visit by Provider  Tobacco  Allergies  Meds  Problems  Med Hx  Surg Hx  Fam Hx         Review of Systems   ROS COMP: Constitutional, HEENT, cardiovascular, pulmonary, GI,  ", musculoskeletal, neuro, skin, endocrine and psych systems are negative, except as otherwise noted.      Objective    /81   Pulse 62   Temp 97.7  F (36.5  C) (Tympanic)   Resp 16   Ht 1.753 m (5' 9\")   Wt 86.2 kg (190 lb)   SpO2 97%   BMI 28.06 kg/m    Body mass index is 28.06 kg/m .  Physical Exam   GENERAL: healthy, alert and no distress, nontoxic in appearance  EYES: Eyes grossly normal to inspection, PERRL and conjunctivae and sclerae normal  HENT: normocephalic  NECK: supple with full ROM  RESP: lungs clear to auscultation - no rales, rhonchi or wheezes  CV: regular rate and rhythm, normal S1 S2, no S3 or S4, no murmur, click or rub, no peripheral edema   ABDOMEN: soft, nontender, no hepatosplenomegaly, no masses and bowel sounds normal  MS: no gross musculoskeletal defects noted, no edema  Hands are warm and pink with equal and strong . Has tingling in both little fingers and when worse riding his bicycle his ring fingers go numb as well.    Diagnostic Test Results:  Labs reviewed in Epic  No results found for this or any previous visit (from the past 24 hour(s)).        Assessment & Plan  This parathesia in his hands of his 4th and 5th digits started with his bike riding. It used to go away when he quit riding but now it persists. I do believe it has something to do with pressure on handle bars. Will refer to OT for further evaluation and treatment.  Problem List Items Addressed This Visit     None      Visit Diagnoses     Paresthesia    -  Primary    Numbness of fingers of both hands        Relevant Orders    OCCUPATIONAL THERAPY REFERRAL             BMI:   Estimated body mass index is 28.06 kg/m  as calculated from the following:    Height as of this encounter: 1.753 m (5' 9\").    Weight as of this encounter: 86.2 kg (190 lb).           Patient Instructions   Set up Occupational Therapy appointment    Follow up with your primary care provider in the next 2 weeks.    Follow-up with your " "primary care provider next week and as needed.    Indications for emergent return to emergency department discussed with patient, who verbalized good understanding and agreement.  Patient understands the limitations of today's evaluation.         Patient Education     Paraesthesias  Paraesthesia is a burning or prickling sensation that is sometimes felt in the hands, arms, legs or feet. It can also occur in other parts of the body. It can also feel like tingling or numbness, skin crawling, or itching. The feeling is not comfortable, but it is not painful. (The \"pins and needles\" feeling that happens when a foot or hand \"falls asleep\" is a temporary paraesthesia.)  Paraesthesias that last or come and go may be caused by medical issues that need to be treated. These include stroke, a bulging disk pressing on a nerve, a trapped nerve, vitamin deficiencies, uncontrolled diabetes, alcohol abuse, or even certain medicines.  Tests are often done. These tests may include blood tests, X-ray, CT (computerized tomography) scan, nerve conduction studies (NCS), or a muscle test (electromyography). Depending on the cause, treatment may include physical therapy.  Home care    Tell your healthcare provider about all medicines you take. This includes prescription and over-the-counter medicines, vitamins, and herbs. Ask if any of the medicines may be causing your problems. Don't make any changes to prescription medicines without talking to your healthcare provider first.    You may be prescribed medicines to help relieve the tingling feeling or for pain. Take all medicines as directed.    A numb hand or foot may be more prone to injury. To help protect it:  ? Always use oven mitts.  ? Test water with an unaffected hand or foot.  ? Use caution when trimming nails. File sharp areas.  ? Wear shoes that fit well to avoid pressure points, blisters, and ulcers.  ? Inspect your hands and feet carefully (including the soles of your feet and " between your toes) daily. If you see red areas, sores, or other problems, tell your healthcare provider.  Follow-up care  Follow up with your doctor, or as advised. You may need further testing or evaluation.  When to seek medical advice  Call your healthcare provider right away if any of the following occur:    Numbness or weakness of the face, one arm, or one leg    Slurred speech, confusion, trouble speaking, walking, or seeing    Severe headache, fainting spell, dizziness, or seizure    Chest, arm, neck, or upper back pain    Loss of bladder or bowel control    Open wound with redness, swelling, or pus  Date Last Reviewed: 4/1/2018 2000-2018 [employer]. [address] [place] [address], [place] [address]. All rights reserved. This information is not intended as a substitute for professional medical care. Always follow your healthcare professional's instructions.             Return in about 2 days (around 8/2/2019) for OT.    JEN [facility]

## 2019-08-07 ENCOUNTER — HOSPITAL ENCOUNTER (OUTPATIENT)
Dept: OCCUPATIONAL THERAPY | Facility: CLINIC | Age: 29
Setting detail: THERAPIES SERIES
End: 2019-08-07
Attending: NURSE PRACTITIONER
Payer: OTHER GOVERNMENT

## 2019-08-07 DIAGNOSIS — R20.0 NUMBNESS OF FINGERS OF BOTH HANDS: ICD-10-CM

## 2019-08-07 PROCEDURE — 97165 OT EVAL LOW COMPLEX 30 MIN: CPT | Mod: GO | Performed by: OCCUPATIONAL THERAPIST

## 2019-08-07 PROCEDURE — 97535 SELF CARE MNGMENT TRAINING: CPT | Mod: GO | Performed by: OCCUPATIONAL THERAPIST

## 2019-08-07 PROCEDURE — 97110 THERAPEUTIC EXERCISES: CPT | Mod: GO | Performed by: OCCUPATIONAL THERAPIST

## 2019-08-07 NOTE — PROGRESS NOTES
08/07/19  Hand Therapy Evaluation   General Information/History   Start Of Care Date 08/07/19   Referring Physician Marjorie Cleaning CNP   Orders Evaluate And Treat As Indicated   Orders Date 07/31/19   Medical Diagnosis bilateral hand digits numbness   Additional Occupational Profile Info/Pertinent history of current problem Patient relates that he has been training for a triathlon and has been having numbness in ulnar hand after biking.   Previous treatment or current condition rest   Past medical history none   How/Where did it occur During contact with an object;During recreation/sports   Onset date of current episode/exacerbation 06/17/19   Chronicity New   Hand Dominance Right   Affected side Bilateral   Functional limitations perform desired leisure / sports activities   Prior level of function Independent ADL;Independent IADL   Important Activities biking, hunting, fishing, working out, in    Patient role/Employment history Employed   Occupation    Employment Status Working in normal job without restrictions   Primary Job Tasks Keyboarding;Using a mouse   Occupation Comments only bothers with push ups   Patient/Family goals statement patient  would like to be able to use hands without going numb   Fall Risk Screen   Fall screen completed by OT   Have you fallen 2 or more times in the past year? No   Have you fallen and had an injury in the past year? No   Is patient a fall risk? No   Abuse Screen (yes response referral indicated)   Feels Unsafe at Home or Work/School no   Feels Threatened by Someone no   Does Anyone Try to Keep You From Having Contact with Others or Doing Things Outside Your Home? no   Physical Signs of Abuse Present no   Pain   Pain Primary Pain Report   Primary Pain Report   Location pain in both wrists only with weight bearing - stabbing and quick otherwise is just reported as numbness   Edema   Edema Comments no apparent edema 16.5cm- dpc bilaterally   Tenderness    Comment no pain with palpation to wrist and ulnar hand bilaterally   Sensation    normal with [place] paul test    ROM AROM   AROM   Comments generally WNL- no pain with over pressure.   Special Tests   Special Tests Assessed   Left Hand Special Tests Comments elbow flexion test after 60 seconds   Right Hand Special Tests Comments elbow flexion test after bilaterally   Strength   Strength Strength;Other (see comments)    Avg - Left 85#    Avg - Right 98#   Lateral Pinch - Left 12   Lateral Pinch - Right 18   3 Point Pinch - Left 18   3 Point Pinch - Right 20   Strength Comments Intrinsics 5/5   Education Assessment   Preferred Learning Style Pictures/video   Barriers to Learning No barriers   Therapy Interventions   Planned Therapy Interventions Stretching;Splinting;Education of splint wear, care, fit and precautions;Self Care/Home Management;Ergonomic Patient Education;Home Program   Therapy plan comments Patient will start with home program consisting of nerve gliding, strengthening, night splinting and activity modification.   Clinical Impression   Criteria for Skilled Therapeutic Interventions Met yes   OT Diagnosis Decreased IADL's   Influenced by the following impairments Pain;Decreased strength;Impaired sensation   Assessment of Occupational Performance 1-3 Performance Deficits   Identified Performance Deficits riding bike, push- ups, weight bearing   Clinical Decision Making (Complexity) Low complexity   Therapy Frequency 1x and PRN   Predicted Duration of Therapy Intervention (days/wks) 6 weeks   Risks and Benefits of Treatment have been explained. Yes   Patient, Family & other staff in agreement with plan of care Yes   Clinical Impression Comments It appears patient has compression at guyans canal from riding bike and possible ulnar nerve compression at elbow. Anticipate patient will benefit from skilled Hand Therapy to meet goals.   Hand Goals   Hand Goals Sports/Recreation    Sports/Recreation   Current Functional Task Weight bearing;Bicycling;Gripping   Previous Performance Level Independent   Current Performance Level Mild difficulty   Goal Target Task Hold &  handlebars   Goal Target Performance Level No difficulty   Due Date 09/20/19   Total Evaluation Time   Lakshmi Alfaro OTR/L DAVIDT  Occupational Therapist, Certified Hand Therapist

## 2019-09-30 ENCOUNTER — HEALTH MAINTENANCE LETTER (OUTPATIENT)
Age: 29
End: 2019-09-30

## 2019-10-10 NOTE — ADDENDUM NOTE
Encounter addended by: Lakshmi Alfaro OT on: 10/10/2019 10:41 AM   Actions taken: Clinical Note Signed, Flowsheet accepted, Episode resolved

## 2019-10-10 NOTE — PROGRESS NOTES
08/07/19   Note: This is a copy of patient's last daily visit and will also serve as their Discharge Summary as they have not been in for further treatment 30 days past this date. Final assessment of goals and physical and functional status , therefore unavailable.     Providers   Providers MARILYN Feliciano/L, CHT   Referring Physician Marjorie Cleaning CNP   General Information   Medical Diagnosis bilateral hand numbness   Orders Evaluate And Treat As Indicated   Start Of Care Date 08/07/19   Onset date of current episode/exacerbation 06/17/19   Subjective Measures   Subjective see eval   Objective Measures   Objective Measures Objective Measure 1   Objective Measure 1   Objective Measure see eval   Therapeutic Exercise   HAND Therapeutic Exercise Other Exercises/Activities   Therapeutic Procedure: strength, endurance, ROM, flexibillity minutes (79650) 8   Other Exer/Activities/Educ   Exercise 1 Theraputty   Description 1 strengthening  and pinch   Description 2 ulnar nerve glidng ex   Exercise 3 HEP instruction   Splinting   Skilled Intervention discussed night positioning and splinting   Self Care/Home Management   Self-Care/Home Mgmt/ADL, Compensatory, Meal Prep Minutes (84802) 15 Minutes   Comments education in diagnosis and activity modification relating to- sleeping positions , positions to avoid with every day activities like lifting weights and riding bike. Education in bike adaptation and cloves to avoid pressure in ulnar wrist   Hand Goals   Hand Goals Sports/Recreation   Sports/Recreation   Current Functional Task Weight bearing;Bicycling;Gripping   Previous Performance Level Independent   Curent Performance Level Mild difficulty   Goal Target Task Hold &  handlebars   Goal Target Performance Level No difficulty   Due Date 09/20/19   Assessment   Clinical Impression(s) Comments see eval   Education   Learner Patient   Readiness Eager   Method Booklet/handout;Explanation;Demonstration    Response Verbalizes understanding;Demonstrates understanding   Education Notes see home program   Plan   Plan To see patient back PRN if any questions or conderns or worsening of symptoms.   Total Session Time   Lakshmi Alfaro OTR/L CHT  Occupational Therapist, Certified Hand Therapist

## 2020-01-28 ENCOUNTER — OFFICE VISIT (OUTPATIENT)
Dept: FAMILY MEDICINE | Facility: CLINIC | Age: 30
End: 2020-01-28
Payer: OTHER GOVERNMENT

## 2020-01-28 VITALS
SYSTOLIC BLOOD PRESSURE: 118 MMHG | RESPIRATION RATE: 18 BRPM | TEMPERATURE: 96.3 F | OXYGEN SATURATION: 97 % | BODY MASS INDEX: 28.44 KG/M2 | HEIGHT: 69 IN | WEIGHT: 192 LBS | DIASTOLIC BLOOD PRESSURE: 72 MMHG | HEART RATE: 71 BPM

## 2020-01-28 DIAGNOSIS — R13.10 DYSPHAGIA, UNSPECIFIED TYPE: Primary | ICD-10-CM

## 2020-01-28 PROCEDURE — 99213 OFFICE O/P EST LOW 20 MIN: CPT | Performed by: FAMILY MEDICINE

## 2020-01-28 ASSESSMENT — MIFFLIN-ST. JEOR: SCORE: 1818.35

## 2020-01-28 NOTE — PROGRESS NOTES
Subjective     Darius Bennett is a 29 year old male who presents to clinic today for the following health issues:  29 yr old male here for throat pain ongoing for months- reports pain in the right side of his throat with some difficulty swallowing fluids and solid foods. Reports that he has had post nasal drip in the past and has take Flonase for this. It did not make much difference. Reports past history of chewing tobacco. Denies weight loss.     HPI   ENT Symptoms             Symptoms: cc Present Absent Comment   Fever/Chills   x    Fatigue   x    Muscle Aches   x    Eye Irritation   x    Sneezing   x    Nasal Kristopher/Drg  x  Nasal drainage    Sinus Pressure/Pain   x    Loss of smell   x    Dental pain   x    Sore Throat  x  R side throat pain    Swollen Glands  x  Possible swollen thyroid    Ear Pain/Fullness   x    Cough   x    Wheeze   x    Chest Pain   x    Shortness of breath   x    Rash   x    Other         Symptom duration:  6 mos cam ein last yr for nasal drainage    Symptom severity:  irritating moderate    Treatments tried:  Flonase for post nasal drip and   ibuprofen for throat pain    Contacts:  none            Patient Active Problem List   Diagnosis     Cluster headaches     IBS (irritable bowel syndrome)     Past Surgical History:   Procedure Laterality Date     Compartment release Bilateral 2007    He reports compartment syndrome in both lower legs for which he had surgery       Social History     Tobacco Use     Smoking status: Never Smoker     Smokeless tobacco: Former User   Substance Use Topics     Alcohol use: Yes     Family History   Problem Relation Age of Onset     Diabetes Paternal Grandmother      Kidney Cancer Paternal Grandfather          Current Outpatient Medications   Medication Sig Dispense Refill     Multiple Vitamins-Minerals (MULTIVITAMIN ADULT PO)        Omega-3 Fatty Acids (FISH OIL OMEGA-3 PO)        verapamil (CALAN-SR) 120 MG TBCR CR tablet Take 1 tablet (120 mg) by mouth  "At Bedtime (Patient taking differently: Take 120 mg by mouth as needed ) 90 tablet 3     order for DME Equipment being ordered: Oxygen (Patient not taking: Reported on 7/31/2019) 1 each 0     SUMAtriptan (IMITREX) 20 MG/ACT nasal spray Spray 1 spray in nostril as needed for migraine 1 each 11     No Known Allergies  BP Readings from Last 3 Encounters:   01/28/20 118/72   07/31/19 128/81   05/03/19 122/70    Wt Readings from Last 3 Encounters:   01/28/20 87.1 kg (192 lb)   07/31/19 86.2 kg (190 lb)   05/03/19 85.7 kg (189 lb)                      Reviewed and updated as needed this visit by Provider  Tobacco  Allergies  Meds  Problems  Med Hx  Surg Hx  Fam Hx         Review of Systems   ROS COMP: Constitutional, HEENT, cardiovascular, pulmonary, gi and gu systems are negative, except as otherwise noted.      Objective    /72   Pulse 71   Temp 96.3  F (35.7  C) (Tympanic)   Resp 18   Ht 1.74 m (5' 8.5\")   Wt 87.1 kg (192 lb)   SpO2 97%   BMI 28.77 kg/m    Body mass index is 28.77 kg/m .  Physical Exam   GENERAL: healthy, alert and no distress  EYES: Eyes grossly normal to inspection, PERRL and conjunctivae and sclerae normal  HENT: normal cephalic/atraumatic, ear canals and TM's normal, nose and mouth without ulcers or lesions, oropharynx clear and oral mucous membranes moist  NECK: no adenopathy, no asymmetry, masses, or scars and thyroid normal to palpation  RESP: lungs clear to auscultation - no rales, rhonchi or wheezes  CV: regular rate and rhythm, normal S1 S2, no S3 or S4, no murmur, click or rub, no peripheral edema and peripheral pulses strong  ABDOMEN: soft, nontender, no hepatosplenomegaly, no masses and bowel sounds normal  MS: no gross musculoskeletal defects noted, no edema    Diagnostic Test Results:  none         Assessment & Plan       ICD-10-CM    1. Dysphagia, unspecified type R13.10 US Head Neck Soft Tissue   Discussed with patient possibilities. It could be a thyroid nodule, it " could be acid reflux, I recommended getting an ultrasound of the neck . If no structural abnormalities consider an acid reflux medication      FUTURE APPOINTMENTS:       - Follow-up visit in one month or sooner as needed.  Patient Instructions         Thank you for choosing [facility].  You may be receiving an email and/or telephone survey request from [facility] Customer Experience regarding your visit today.  Please take a few minutes to respond to the survey to let us know how we are doing.      If you have questions or concerns, please contact us via [employer] or you can contact your care team at [phone].    Our Clinic hours are:  Monday 6:40 am  to 7:00 pm  Tuesday -Friday 6:40 am to 5:00 pm    The [place] outpatient lab hours are:  Monday - Friday 6:10 am to 4:45 pm  Saturdays 7:00 am to 11:00 am  Appointments are required, call [phone]    If you have clinical questions after hours or would like to schedule an appointment,  call the clinic at [phone].      Return in about 4 weeks (around 2/25/2020) for Routine Visit.    Laquita Peres MD  [facility]

## 2020-01-28 NOTE — PATIENT INSTRUCTIONS
Thank you for choosing [facility].  You may be receiving an email and/or telephone survey request from [facility] Customer Experience regarding your visit today.  Please take a few minutes to respond to the survey to let us know how we are doing.      If you have questions or concerns, please contact us via [employer] or you can contact your care team at [phone].    Our Clinic hours are:  Monday 6:40 am  to 7:00 pm  Tuesday -Friday 6:40 am to 5:00 pm    The [place] outpatient lab hours are:  Monday - Friday 6:10 am to 4:45 pm  Saturdays 7:00 am to 11:00 am  Appointments are required, call [phone]    If you have clinical questions after hours or would like to schedule an appointment,  call the clinic at [phone].

## 2020-01-30 ENCOUNTER — HOSPITAL ENCOUNTER (OUTPATIENT)
Dept: ULTRASOUND IMAGING | Facility: CLINIC | Age: 30
Discharge: HOME | End: 2020-01-30
Attending: FAMILY MEDICINE | Admitting: FAMILY MEDICINE
Payer: OTHER GOVERNMENT

## 2020-01-30 DIAGNOSIS — R13.10 DYSPHAGIA, UNSPECIFIED TYPE: ICD-10-CM

## 2020-01-30 PROCEDURE — 76536 US EXAM OF HEAD AND NECK: CPT

## 2020-01-31 NOTE — RESULT ENCOUNTER NOTE
Please inform patient that test result was within normal parameters.   Thank you.     Laquita Peres M.D.

## 2020-07-07 ENCOUNTER — TELEPHONE (OUTPATIENT)
Dept: FAMILY MEDICINE | Facility: CLINIC | Age: 30
End: 2020-07-07

## 2020-07-07 ENCOUNTER — NURSE TRIAGE (OUTPATIENT)
Dept: NURSING | Facility: CLINIC | Age: 30
End: 2020-07-07

## 2020-07-07 ENCOUNTER — VIRTUAL VISIT (OUTPATIENT)
Dept: FAMILY MEDICINE | Facility: OTHER | Age: 30
End: 2020-07-07

## 2020-07-07 NOTE — TELEPHONE ENCOUNTER
Directed pt to contact [email or web address] for further evaluation.  Pt will do this.    Pt was in contact with a COVID positive pt today.  Pt was positive yesterday per pt report.  Carlota Waldron RN

## 2020-07-07 NOTE — TELEPHONE ENCOUNTER
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  He was exposed on 6/22/2020 (he found out today). He also has a work partner who was also possibly exposed.   No symptoms currently.   PCP: Cynthia Fraire.   Has access to the internet: [employer]  Triaged to a disposition of Call PCP within 24 hrs. He intends to call his clinic now for an order to be tested. Also discussed option of [employer].     Reason for Disposition    [1] COVID-19 EXPOSURE (Close Contact) within last 14 days AND [2] needs COVID-19 lab test to return to work AND [3] NO symptoms    Additional Information    Negative: COVID-19 has been diagnosed by a healthcare provider (HCP)    Negative: COVID-19 lab test positive    Negative: [1] Symptoms of COVID-19 (e.g., cough, fever, SOB, or others) AND [2] lives in an area with community spread    Negative: [1] Symptoms of COVID-19 (e.g., cough, fever, SOB, or others) AND [2] within 14 days of EXPOSURE (close contact) with diagnosed or suspected COVID-19 patient    Negative: [1] Symptoms of COVID-19 (e.g., cough, fever, SOB, or others) AND [2] within 14 days of travel from high-risk area for COVID-19 community spread (identified by CDC)    Negative: [1] Difficulty breathing (shortness of breath) occurs AND [2] onset > 14 days after COVID-19 EXPOSURE (Close Contact) AND [3] no community spread where patient lives    Negative: [1] Dry cough occurs AND [2] onset > 14 days after COVID-19 EXPOSURE AND [3] no community spread where patient lives    Negative: [1] Wet cough (i.e., white-yellow, yellow, green, or dakota colored sputum) AND [2] onset > 14 days after COVID-19 EXPOSURE AND [3] no community spread where patient lives    Negative: [1] Common cold symptoms AND [2] onset > 14 days after COVID-19 EXPOSURE AND [3] no community spread where patient lives    Protocols used: CORONAVIRUS (COVID-19) EXPOSURE-A- 5.16.20    COVID 19 Nurse Triage Plan/Patient  Instructions    Please be aware that novel coronavirus (COVID-19) may be circulating in the community. If you develop symptoms such as fever, cough, or SOB or if you have concerns about the presence of another infection including coronavirus (COVID-19), please contact your health care provider or visit [email or web address].     Disposition/Instructions    Patient to schedule a Virtual Visit with provider. Reference Visit Selection Guide.    Thank you for taking steps to prevent the spread of this virus.  o Limit your contact with others.  o Wear a simple mask to cover your cough.  o Wash your hands well and often.    Resources    [facility]: About COVID-19: [email or web address]    CDC: What to Do If You're Sick: www.cdc.gov/coronavirus/2019-ncov/about/steps-when-sick.html    CDC: Ending Home Isolation: www.cdc.gov/coronavirus/2019-ncov/hcp/disposition-in-home-patients.html     CDC: Caring for Someone: www.cdc.gov/coronavirus/2019-ncov/if-you-are-sick/care-for-someone.html     [facility]: Interim Guidance for Hospital Discharge to Home: www.[facility].[place]/diseases/coronavirus/hcp/hospdischarge.pdf    [facility] clinical trials (COVID-19 research studies): clinicalaffairs.[facility]-clinical-trials     Below are the COVID-19 hotlines at the [place] [facility] of [facility] ([facility]). Interpreters are available.   o For health questions: Call 790-821-9498 or 1-326.266.5327 (7 a.m. to 7 p.m.)  o For questions about schools and childcare: Call 692-069-5644 or 1-114.838.3944 (7 a.m. to 7 p.m.)

## 2020-07-07 NOTE — PROGRESS NOTES
"Date: 2020 17:25:07  Clinician: Andres Valverde  Clinician NPI: [id number]  Patient: Darius Bennett  Patient : 1990  Patient Address: [address] [place] [address] [place] , [place] [address]  Patient Phone: [phone]  Visit Protocol: URI  Patient Summary:  Darius is a 30 year old ( : 1990 ) male who initiated a Visit for COVID-19 (Coronavirus) evaluation and screening. When asked the question \"Please sign me up to receive news, health information and promotions. \", Darius responded \"No\".    When asked when his symptoms started, Darius reported that he does not have any symptoms.   He denies having recent facial or sinus surgery in the past 60 days and taking antibiotic medication in the past month.    Pertinent COVID-19 (Coronavirus) information  In the past 14 days, Darius has not worked in a congregate living setting.   He does not work or volunteer as healthcare worker or a  and does not work or volunteer in a healthcare facility.   Darius also has not lived in a congregate living setting in the past 14 days. He does not live with a healthcare worker.   Darius has had a close contact with a laboratory-confirmed COVID-19 patient in the last 14 days. 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   Pertinent medical history  Darius does not need a return to work/school note.   Weight: 190 lbs   Darius does not smoke or use smokeless tobacco.   Weight: 190 lbs    MEDICATIONS: No current medications, ALLERGIES: NKDA  Clinician Response:  Dear Darius,   Based on your exposure to COVID-19 (coronavirus), we would like to test you for this virus.  1. Please call [phone] to schedule your visit. Explain that you were referred by [facility] to have a COVID-19 test. Be ready to share your [facility] visit ID number.  The following will serve as your " written order for this COVID Test, ordered by me, for the indication of suspected COVID [Z20.828]: The test will be ordered in [facility], our electronic health record, after you are scheduled. It will show as ordered and authorized by Ike Saba MD.  Order: COVID-19 (coronavirus) PCR for ASYMPTOMATIC EXPOSURE [facility].  If you know you have had close contact with someone who tested positive, you should be quarantined for 14 days after this exposure. You should stay in quarantine for the14 days even if the covid test is negative, the optimal time to test after exposure is 5-7 days from the exposure  Quarantine means   What should I do?  For safety, it's very important to follow these rules. Do this for 14 days after the date you were last exposed to the virus..  Stay home and away from others. Don't go to school or anywhere else. Generally quarantine means staying home for work but there are some exceptions to this. Please contact your workplace.   No hugging, kissing or shaking hands.  Don't let anyone visit.  Cover your mouth and nose with a mask, tissue or washcloth to avoid spreading germs.  Wash your hands and face often. Use soap and water.  What are the symptoms of COVID-19?  The most common symptoms are cough, fever and trouble breathing. Less common symptoms include headache, body aches, fatigue (feeling very tired), chills, sore throat, stuffy or runny nose, diarrhea (loose poop), loss of taste or smell, belly pain, and nausea or vomiting (feeling sick to your stomach or throwing up).  After 14 days, if you have still don't have symptoms, you likely don't have this virus.  If you develop symptoms, follow these guidelines.  If you're normally healthy: Please start another [facility] visit to report your symptoms. Go to [email or web address].  If you have a serious health problem (like cancer, heart failure, an organ transplant or kidney disease): Call your specialty clinic. Let them know that you might have  COVID-19.  2. When it's time for your COVID test:  Stay at least 6 feet away from others. (If someone will drive you to your test, stay in the backseat, as far away from the  as you can.)  Cover your mouth and nose with a mask, tissue or washcloth.  Go straight to the testing site. Don't make any stops on the way there or back.  Please note  Caregivers in these groups are at risk for severe illness due to COVID-19:  o People 65 years and older  o People who live in a nursing home or long-term care facility  o People with chronic disease (lung, heart, cancer, diabetes, kidney, liver, immunologic)  o People who have a weakened immune system, including those who:  Are in cancer treatment  Take medicine that weakens the immune system, such as corticosteroids  Had a bone marrow or organ transplant  Have an immune deficiency  Have poorly controlled HIV or AIDS  Are obese (body mass index of 40 or higher)  Smoke regularly  Where can I get more information?  [facility] -- About COVID-19: [email or web address]  CDC -- What to Do If You're Sick: www.cdc.gov/coronavirus/2019-ncov/about/steps-when-sick.html  CDC -- Ending Home Isolation: www.cdc.gov/coronavirus/2019-ncov/hcp/disposition-in-home-patients.html  CDC -- Caring for Someone: www.cdc.gov/coronavirus/2019-ncov/if-you-are-sick/care-for-someone.html  [facility] -- Interim Guidance for Hospital Discharge to Home: www.health.[facility].mn.us/diseases/coronavirus/hcp/hospdischarge.pdf  [facility] clinical trials (COVID-19 research studies): clinicalaffairs.[facility]-clinical-trials  Below are the COVID-19 hotlines at the [facility] ([facility]). Interpreters are available.  For health questions: Call 067-270-7738 or 1-214.574.1452 (7 a.m. to 7 p.m.)  For questions about schools and childcare: Call 899-457-3060 or 1-934.507.3537 (7 a.m. to 7 p.m.)    Diagnosis: Contact with and (suspected) exposure to other viral communicable  diseases  Diagnosis ICD: Z20.828

## 2020-07-07 NOTE — TELEPHONE ENCOUNTER
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 Patient was in contact with victim who tested positive for Covid19    Phone Number Patient can be reached at: Home number on file [phone] (home)    Best Time: Any    Can we leave a detailed message on this number? YES    Call taken on 7/7/2020 at 4:30 PM by Patricia Osuna

## 2020-07-09 DIAGNOSIS — Z20.822 ENCOUNTER FOR LABORATORY TESTING FOR COVID-19 VIRUS: Primary | ICD-10-CM

## 2020-07-09 LAB
SARS-COV-2 RNA SPEC QL NAA+PROBE: NOT DETECTED
SPECIMEN SOURCE: NORMAL

## 2020-07-09 PROCEDURE — 99207 ZZC NO CHARGE LOS: CPT

## 2020-07-09 PROCEDURE — U0003 INFECTIOUS AGENT DETECTION BY NUCLEIC ACID (DNA OR RNA); SEVERE ACUTE RESPIRATORY SYNDROME CORONAVIRUS 2 (SARS-COV-2) (CORONAVIRUS DISEASE [COVID-19]), AMPLIFIED PROBE TECHNIQUE, MAKING USE OF HIGH THROUGHPUT TECHNOLOGIES AS DESCRIBED BY CMS-2020-01-R: HCPCS | Performed by: FAMILY MEDICINE

## 2020-07-09 NOTE — LETTER
July 14, 2020        Darius [address] [facility] [address]    This letter provides a written record that you were tested for COVID-19 on 7/9/20.       Your result was negative. This means that we didn t find the virus that causes COVID-19 in your sample. A test may show negative when you do actually have the virus. This can happen when the virus is in the early stages of infection, before you feel illness symptoms.    If you have symptoms   Stay home and away from others (self-isolate) until you meet ALL of the guidelines below:    You ve had no fever--and no medicine that reduces fever--for 3 full days (72 hours). And      Your other symptoms have gotten better. For example, your cough or breathing has improved. And     At least 10 days have passed since your symptoms started.    During this time:    Stay home. Don t go to work, school or anywhere else.     Stay in your own room, including for meals. Use your own bathroom if you can.    Stay away from others in your home. No hugging, kissing or shaking hands. No visitors.    Clean  high touch  surfaces often (doorknobs, counters, handles, etc.). Use a household cleaning spray or wipes. You can find a full list on the EPA website at www.epa.gov/pesticide-registration/list-n-disinfectants-use-against-sars-cov-2.    Cover your mouth and nose with a mask, tissue or washcloth to avoid spreading germs.    Wash your hands and face often with soap and water.    Going back to work  Check with your employer for any guidelines to follow for going back to work.    Employers: This document serves as formal notice that your employee tested negative for COVID-19, as of the testing date shown above.

## 2020-07-31 ENCOUNTER — VIRTUAL VISIT (OUTPATIENT)
Dept: FAMILY MEDICINE | Facility: CLINIC | Age: 30
End: 2020-07-31
Payer: OTHER GOVERNMENT

## 2020-07-31 DIAGNOSIS — G89.29 CHRONIC THROAT PAIN: Primary | ICD-10-CM

## 2020-07-31 DIAGNOSIS — R07.0 CHRONIC THROAT PAIN: Primary | ICD-10-CM

## 2020-07-31 PROCEDURE — 99213 OFFICE O/P EST LOW 20 MIN: CPT | Mod: 95 | Performed by: FAMILY MEDICINE

## 2020-07-31 NOTE — PROGRESS NOTES
"Darius Bennett is a 30 year old male who is being evaluated via a billable telephone visit.      The patient has been notified of following:     \"This telephone visit will be conducted via a call between you and your physician/provider. We have found that certain health care needs can be provided without the need for a physical exam.  This service lets us provide the care you need with a short phone conversation.  If a prescription is necessary we can send it directly to your pharmacy.  If lab work is needed we can place an order for that and you can then stop by our lab to have the test done at a later time.    Telephone visits are billed at different rates depending on your insurance coverage. During this emergency period, for some insurers they may be billed the same as an in-person visit.  Please reach out to your insurance provider with any questions.    If during the course of the call the physician/provider feels a telephone visit is not appropriate, you will not be charged for this service.\"    Patient has given verbal consent for Telephone visit?  Yes    What phone number would you like to be contacted at? [phone]    How would you like to obtain your AVS? Solitario Echeverria     Darius Bennett is a 30 year old male who presents via phone visit today for the following health issues:    HPI   Patient is a 30-year-old male who presents via telephone encounter for chronic tooth pain that has been ongoing for several months.  Patient was seen in January of this year and an ultrasound of his neck was done which showed no abnormalities.  He continues to have severe throat pain denies any difficulty with swallowing has tried antacids over-the-counter medication and allergy medication with no relief.  He reports that he is uncomfortable with the throat pain like to have further testing done, we discussed either doing an EGD to rule out ulcers or acid reflux or going the route of seeing an ENT " specialist.  Patient would like to start with an EGD and this was ordered for him.  Chief Complaint   Patient presents with     Results     Go over US test results from 01/30/20. Pt is curious what the next steps would be.            Patient Active Problem List   Diagnosis     Cluster headaches     IBS (irritable bowel syndrome)     Past Surgical History:   Procedure Laterality Date     Compartment release Bilateral 2007    He reports compartment syndrome in both lower legs for which he had surgery       Social History     Tobacco Use     Smoking status: Never Smoker     Smokeless tobacco: Former User   Substance Use Topics     Alcohol use: Yes     Family History   Problem Relation Age of Onset     Diabetes Paternal Grandmother      Kidney Cancer Paternal Grandfather          Current Outpatient Medications   Medication Sig Dispense Refill     Multiple Vitamins-Minerals (MULTIVITAMIN ADULT PO)        Omega-3 Fatty Acids (FISH OIL OMEGA-3 PO)        SUMAtriptan (IMITREX) 20 MG/ACT nasal spray Spray 1 spray in nostril as needed for migraine 1 each 11     verapamil (CALAN-SR) 120 MG TBCR CR tablet Take 1 tablet (120 mg) by mouth At Bedtime (Patient taking differently: Take 120 mg by mouth as needed ) 90 tablet 3     order for DME Equipment being ordered: Oxygen (Patient not taking: Reported on 7/31/2019) 1 each 0     No Known Allergies  BP Readings from Last 3 Encounters:   01/28/20 118/72   07/31/19 128/81   05/03/19 122/70    Wt Readings from Last 3 Encounters:   01/28/20 87.1 kg (192 lb)   07/31/19 86.2 kg (190 lb)   05/03/19 85.7 kg (189 lb)                    Reviewed and updated as needed this visit by Provider  Tobacco  Allergies  Meds  Problems  Med Hx  Surg Hx  Fam Hx         Review of Systems   Constitutional, HEENT, cardiovascular, pulmonary, gi and gu systems are negative, except as otherwise noted.       Objective   Reported vitals:  There were no vitals taken for this visit.   healthy, alert and no  distress  PSYCH: Alert and oriented times 3; coherent speech, normal   rate and volume, able to articulate logical thoughts, able   to abstract reason, no tangential thoughts, no hallucinations   or delusions  His affect is normal  RESP: No cough, no audible wheezing, able to talk in full sentences  Remainder of exam unable to be completed due to telephone visits    Diagnostic Test Results:  Labs reviewed in Epic  none         Assessment/Plan:    1. Chronic throat pain  Patient referred for EGD  - GASTROENTEROLOGY ADULT REF PROCEDURE ONLY; Future    Return in about 4 weeks (around 8/28/2020) for In-Clinic Visit.      Phone call duration:  5 minutes    Laquita Peres MD

## 2020-08-17 ENCOUNTER — TELEPHONE (OUTPATIENT)
Dept: FAMILY MEDICINE | Facility: CLINIC | Age: 30
End: 2020-08-17

## 2020-08-17 NOTE — TELEPHONE ENCOUNTER
Reason for Call:  Procedure  has reached out to schedule diagnostic upper gi endoscopy    Detailed comments: patient has failed to return calls    No further action necessary for procedure  at this time    Phone Number Patient can be reached at: Home number on file [phone] (home)    Best Time: NA    Can we leave a detailed message on this number? Not Applicable    Call taken on 8/17/2020 at 11:43 AM by Faby Peters

## 2020-12-17 ENCOUNTER — OFFICE VISIT (OUTPATIENT)
Dept: FAMILY MEDICINE | Facility: CLINIC | Age: 30
End: 2020-12-17
Payer: OTHER GOVERNMENT

## 2020-12-17 VITALS
TEMPERATURE: 97.7 F | BODY MASS INDEX: 28.76 KG/M2 | DIASTOLIC BLOOD PRESSURE: 70 MMHG | WEIGHT: 194.2 LBS | SYSTOLIC BLOOD PRESSURE: 104 MMHG | RESPIRATION RATE: 16 BRPM | OXYGEN SATURATION: 97 % | HEART RATE: 69 BPM | HEIGHT: 69 IN

## 2020-12-17 DIAGNOSIS — M54.50 MIDLINE LOW BACK PAIN WITHOUT SCIATICA, UNSPECIFIED CHRONICITY: Primary | ICD-10-CM

## 2020-12-17 PROCEDURE — 99213 OFFICE O/P EST LOW 20 MIN: CPT | Performed by: NURSE PRACTITIONER

## 2020-12-17 RX ORDER — NAPROXEN 500 MG/1
500 TABLET ORAL 2 TIMES DAILY WITH MEALS
Qty: 60 TABLET | Refills: 1 | Status: SHIPPED | OUTPATIENT
Start: 2020-12-17 | End: 2021-05-05

## 2020-12-17 ASSESSMENT — MIFFLIN-ST. JEOR: SCORE: 1831.27

## 2020-12-17 NOTE — PROGRESS NOTES
"Juwan Bennett is a 30 year old male who presents to clinic today for the following health issues:    HPI         Back Pain  Onset/Duration: X August 2020  Description:   Location of pain: low back bilateral  Character of pain: sharp, dull ache, constant and intermittent  Pain radiation: none  New numbness or weakness in legs, not attributed to pain: no   Intensity: Currently 5/10 at times with the sharp pain it is higher  Progression of Symptoms: worsening  History:   Specific cause: pt. Is in the  does a lot of moving around  Pain interferes with job:  no   History of back problems: no prior back problems  Any previous MRI or X-rays: None  Sees a specialist for back pain: No  Alleviating factors:   Improved by: cold, heat and NSAIDs    Precipitating factors:  Worsened by: Bending and Sitting bending back  Therapies tried and outcome: cold, heat and NSAIDs    Accompanying Signs & Symptoms:  Risk of Fracture: None  Risk of Cauda Equina: None  Risk of Infection: None  Risk of Cancer: None  Risk of Ankylosing Spondylitis: Onset at age <35, male, AND morning back stiffness no      Above HPI reviewed. Additionally, no inciting event he can recall. Worse as the day goes on. No specific movement exacerbates pain. No paresthesias or saddle anesthesia. No radiation of pain to legs or buttocks. Denies dysuria, hematuria, urinary frequency. No fever. No history of malignancy or IVDU.         Review of Systems   Constitutional, HEENT, cardiovascular, pulmonary, gi and gu systems are negative, except as otherwise noted.      Objective    /70   Pulse 69   Temp 97.7  F (36.5  C) (Tympanic)   Resp 16   Ht 1.753 m (5' 9\")   Wt 88.1 kg (194 lb 3.2 oz)   SpO2 97%   BMI 28.68 kg/m    Body mass index is 28.68 kg/m .  Physical Exam   GENERAL: healthy, alert and no distress  Comprehensive back pain exam:  No tenderness, Pain limits the following motions: extension, lateral rotation, Lower " "extremity strength functional and equal on both sides, Lower extremity sensation normal and equal on both sides and Straight leg raise negative bilaterally            Assessment & Plan     Midline low back pain without sciatica, unspecified chronicity  Nothing to suggest pyelonephritis, nephrolithiasis, epidural abscess, intervertebral tumor, fracture. No radicular pain. Trial of naproxen and tizanidine. Given ongoing nature of pain, referred to PT for evaluation.  - naproxen (NAPROSYN) 500 MG tablet; Take 1 tablet (500 mg) by mouth 2 times daily (with meals)  - tiZANidine (ZANAFLEX) 4 MG tablet; Take 1 tablet (4 mg) by mouth 3 times daily  - PHYSICAL THERAPY REFERRAL; Future     BMI:   Estimated body mass index is 28.68 kg/m  as calculated from the following:    Height as of this encounter: 1.753 m (5' 9\").    Weight as of this encounter: 88.1 kg (194 lb 3.2 oz).            Patient Instructions   Your back pain is likely due to a muscular strain. I did place an order for PT    I have prescribed Naproxen to help with the pain.    You may use the Zanaflex as needed for muscle spasm. Use caution while taking this medication, as it can make you drowsy. Do not take while driving, operating heavy machinery, or doing any activities requiring intense concentration.    Try using a heating pad and/or warm baths.    Make sure to keep moving to avoid getting stiff. See below for stretching exercises.    If you develop fever, severe pain that prevents you from walking at all, weakness of your arms or legs, loss of bowel or bladder continence, or any other new concerning symptoms, go to the ER immediately.    Otherwise, follow up with primary care doctor as needed or if no improvement in pain in symptoms in 1 week.    Naproxen Discharge Instructions:  You have been prescribed Naproxen for pain control.  The maximum dose of naproxen is 1100 mg in a 24-hour period.    Take this medication with food to prevent stomach irritation.  " With long-term use this medication can irritate the stomach causing pain and lead to development of a stomach ulcer.  If you notice stomach pain or vomiting of coffee-ground colored vomit or blood, please be seen by a healthcare provider.  Attempt to use this medication for the shortest time possible.            Return in about 1 month (around 1/17/2021) for worsening or continued symptoms.    [facility]

## 2020-12-17 NOTE — PATIENT INSTRUCTIONS
Your back pain is likely due to a muscular strain. I did place an order for PT    I have prescribed Naproxen to help with the pain.    You may use the Zanaflex as needed for muscle spasm. Use caution while taking this medication, as it can make you drowsy. Do not take while driving, operating heavy machinery, or doing any activities requiring intense concentration.    Try using a heating pad and/or warm baths.    Make sure to keep moving to avoid getting stiff. See below for stretching exercises.    If you develop fever, severe pain that prevents you from walking at all, weakness of your arms or legs, loss of bowel or bladder continence, or any other new concerning symptoms, go to the ER immediately.    Otherwise, follow up with primary care doctor as needed or if no improvement in pain in symptoms in 1 week.    Naproxen Discharge Instructions:  You have been prescribed Naproxen for pain control.  The maximum dose of naproxen is 1100 mg in a 24-hour period.    Take this medication with food to prevent stomach irritation.  With long-term use this medication can irritate the stomach causing pain and lead to development of a stomach ulcer.  If you notice stomach pain or vomiting of coffee-ground colored vomit or blood, please be seen by a healthcare provider.  Attempt to use this medication for the shortest time possible.

## 2021-01-15 ENCOUNTER — HEALTH MAINTENANCE LETTER (OUTPATIENT)
Age: 31
End: 2021-01-15

## 2021-01-29 ENCOUNTER — MYC MEDICAL ADVICE (OUTPATIENT)
Dept: FAMILY MEDICINE | Facility: CLINIC | Age: 31
End: 2021-01-29

## 2021-02-03 ENCOUNTER — VIRTUAL VISIT (OUTPATIENT)
Dept: FAMILY MEDICINE | Facility: CLINIC | Age: 31
End: 2021-02-03
Payer: OTHER GOVERNMENT

## 2021-02-03 DIAGNOSIS — G44.009 CLUSTER HEADACHE, NOT INTRACTABLE, UNSPECIFIED CHRONICITY PATTERN: Primary | ICD-10-CM

## 2021-02-03 PROCEDURE — 99441 PR PHYSICIAN TELEPHONE EVALUATION 5-10 MIN: CPT | Mod: TEL | Performed by: FAMILY MEDICINE

## 2021-02-03 RX ORDER — VERAPAMIL HYDROCHLORIDE 120 MG/1
120 TABLET, FILM COATED, EXTENDED RELEASE ORAL AT BEDTIME
Qty: 90 TABLET | Refills: 3 | Status: SHIPPED | OUTPATIENT
Start: 2021-02-03 | End: 2021-05-05

## 2021-02-03 RX ORDER — SUMATRIPTAN 20 MG/1
1 SPRAY NASAL PRN
Qty: 1 EACH | Refills: 0 | Status: SHIPPED | OUTPATIENT
Start: 2021-02-03 | End: 2021-03-30

## 2021-02-03 NOTE — PROGRESS NOTES
Darius is a 30 year old who is being evaluated via a billable telephone visit.      What phone number would you like to be contacted at? [phone]  How would you like to obtain your AVS? MyChart  Assessment & Plan     Cluster headache, not intractable, unspecified chronicity pattern  Medication faxed.   - verapamil ER (CALAN-SR) 120 MG CR tablet; Take 1 tablet (120 mg) by mouth At Bedtime  - SUMAtriptan (IMITREX) 20 MG/ACT nasal spray; Spray 1 spray in nostril as needed for migraine May repeat in 2 hours. Max 2 sprays/24 hours.                 FUTURE APPOINTMENTS:       - Follow-up visit in one month or sooner as needed.    Return in about 4 weeks (around 3/3/2021) for Follow up.    Laquita Peres MD  [facility]    Juwan Mccoy is a 30 year old who presents to clinic today for the following health issues     HPI   Patient is a 30-year-old male who comes to the clinic via telephone encounter today requesting a refill on verapamil and sumatriptan.  He reports he has a diagnosis of cluster headaches and has been evaluated by neurology in the past. He is in the  and was evaluated in [place] a few years ago.  He says that the only thing that has been shown to help with his cluster headaches is verapamil and sumatriptan the nasal form.  He says he has been getting the cluster headaches more  recently and he will like to restart his medications.  Unfortunately I was not unable to review his records from the neurologist and I recommended that he get those records faxed to us.  I did give him a refill but emphasized records from his neurologist so we can have that as proof that he has been on this medication.  He denies any side effects to the medication and states they have been effective.  He had no other complaints today.  Chief Complaint   Patient presents with     Medication Refill     verapamil and sumatriptan, not taken since 2018. Getting headaches more frequently.       Headache  Onset/Duration: For about 10 years   Description  Location: left in the temporal area   Character: throbbing pain  Frequency:  In remission, 1 within the cycle   Duration:  For about 25 minutes- 4 hours   Wake with headaches: YES  Able to do daily activities when headache present: YES- someday's no   Intensity:  severe  Progression of Symptoms:  waxing and waning  Accompanying signs and symptoms:  Stiff neck: no  Neck or upper back pain: no  Sinus or URI symptoms no   Fever: no  Nausea or vomiting: Yes, first headache for cycle   Dizziness: no  Numbness/tingling: no  Weakness: no  Visual changes: none  History  Head trauma: yes, concussions a while   Family history of migraines: no  Daily pain medication use: no  Previous tests for headaches: YES  Neurologist evaluation: YES  Precipitating or Alleviating factors (light/sound/sleep/caffeine): na   Therapies tried and outcome: verapamil and sumatriptan (prefers zomig but insurance does not pay for it)              Outcome - effective  Frequent/daily pain medication use: no        Review of Systems   Constitutional, HEENT, cardiovascular, pulmonary, gi and gu systems are negative, except as otherwise noted.      Objective           Vitals:  No vitals were obtained today due to virtual visit.    Physical Exam   healthy, alert and no distress  PSYCH: Alert and oriented times 3; coherent speech, normal   rate and volume, able to articulate logical thoughts, able   to abstract reason, no tangential thoughts, no hallucinations   or delusions  His affect is normal  RESP: No cough, no audible wheezing, able to talk in full sentences  Remainder of exam unable to be completed due to telephone visits                Phone call duration: 7 minutes

## 2021-03-30 ENCOUNTER — TELEPHONE (OUTPATIENT)
Dept: FAMILY MEDICINE | Facility: CLINIC | Age: 31
End: 2021-03-30

## 2021-03-30 DIAGNOSIS — G44.009 CLUSTER HEADACHE, NOT INTRACTABLE, UNSPECIFIED CHRONICITY PATTERN: ICD-10-CM

## 2021-03-30 DIAGNOSIS — G44.019 EPISODIC CLUSTER HEADACHE, NOT INTRACTABLE: ICD-10-CM

## 2021-03-30 DIAGNOSIS — R51.9 CHRONIC NONINTRACTABLE HEADACHE, UNSPECIFIED HEADACHE TYPE: Primary | ICD-10-CM

## 2021-03-30 DIAGNOSIS — G89.29 CHRONIC NONINTRACTABLE HEADACHE, UNSPECIFIED HEADACHE TYPE: Primary | ICD-10-CM

## 2021-03-30 RX ORDER — SUMATRIPTAN 20 MG/1
1 SPRAY NASAL PRN
Qty: 1 EACH | Refills: 1 | Status: SHIPPED | OUTPATIENT
Start: 2021-03-30 | End: 2021-05-05

## 2021-03-30 NOTE — TELEPHONE ENCOUNTER
Reason for Call: Request for an order or referral:    Order or referral being requested: Patient is calling asking for a referral for neurology for his head ache he is also asking for a refill on     Pending Prescriptions:                       Disp   Refills    SUMAtriptan (IMITREX) 20 MG/ACT nasal spr*1 each 0            Sig: Spray 1 spray in nostril as needed for migraine           May repeat in 2 hours. Max 2 sprays/24 hours.     Date needed: at your convenience    Has the patient been seen by the PCP for this problem? YES      Phone number Patient can be reached at:  Home number on file [phone] (home)    Best Time:  any    Can we leave a detailed message on this number?  YES    Call taken on 3/30/2021 at 12:36 PM by Neha Ham

## 2021-03-30 NOTE — TELEPHONE ENCOUNTER
Dr. Peres,    Patient asking to see neurologist for his headaches.  Patient asking for refill on imitrex.  I have refilled this for him.  The neurology referral is pended for your approval.  Just a FYI-  Our neurologist is going to [place] [facility] and will only be here 1 day a week.  Thank you, Britni BRADY RN

## 2021-04-06 NOTE — PROGRESS NOTES
[facility] Neurology Consultation    Darius Bennett MRN# [id number]   Age: 31 year old YOB: 1990     Requesting physician: Katy Delacruz     Reason for Consultation: headaches      History of Presenting Symptoms:   Darius Bennett is a 31 year old male who presents today for evaluation of headaches.     Patient has previous diagnosis of cluster headaches. Headache cycles started around 2009. He used to get them every year and they would last for several weeks. Now he is getting them every several years and they last for several months. He had a headache cycle in 2018 that lasted for 2 months. He is currently in a cycle that started within the last month.     Pain is always in the left temple behind the eye. It is a stabbing, excruciating pain. He uses an ice pack on his head. He gets congestion in the nose. He sometimes gets a drooping eyelid on the left. He has redness in the eye. He is pacing around the room. Attacks can last from 20 minutes to 3 hours. This morning headache lasted 20-30 minutes. He had a headache last night lasting several hours. Generally he gets 1-3 headaches per day during the cycles.    Intranasal sumatriptan will last around half of the time.     Alcohol can trigger the headaches. Overheating can also trigger a headache.     Cycles can happen any time of year. Every cycle is a little bit different. Often times they happen around the same time at night.     MRI brain was completed in 2016 when cluster headaches were diagnosed. He was told that this was normal.       Past Medical History:     Patient Active Problem List   Diagnosis     Cluster headaches     IBS (irritable bowel syndrome)     Past Medical History:   Diagnosis Date     Cluster headaches      IBS (irritable bowel syndrome)         Past Surgical History:     Past Surgical History:   Procedure Laterality Date     Compartment release Bilateral 2007    He reports compartment syndrome in  both lower legs for which he had surgery        Social History:     Social History     Tobacco Use     Smoking status: Never Smoker     Smokeless tobacco: Former User   Substance Use Topics     Alcohol use: Yes     Comment: occasional      Drug use: No        Family History:     Family History   Problem Relation Age of Onset     Diabetes Paternal Grandmother      Kidney Cancer Paternal Grandfather         Medications:     Current Outpatient Medications   Medication Sig     Multiple Vitamins-Minerals (MULTIVITAMIN ADULT PO)      naproxen (NAPROSYN) 500 MG tablet Take 1 tablet (500 mg) by mouth 2 times daily (with meals)     Omega-3 Fatty Acids (FISH OIL OMEGA-3 PO)      order for DME Equipment being ordered: Oxygen (Patient not taking: Reported on 7/31/2019)     SUMAtriptan (IMITREX) 20 MG/ACT nasal spray Spray 1 spray in nostril as needed for migraine May repeat in 2 hours. Max 2 sprays/24 hours.     verapamil (CALAN-SR) 120 MG TBCR CR tablet Take 1 tablet (120 mg) by mouth At Bedtime (Patient not taking: Reported on 2/3/2021)     verapamil ER (CALAN-SR) 120 MG CR tablet Take 1 tablet (120 mg) by mouth At Bedtime     No current facility-administered medications for this visit.         Allergies:   No Known Allergies     Review of Systems:   A comprehensive 10 point review of systems (constitutional, ENT, cardiac, peripheral vascular, lymphatic, respiratory, GI, , Musculoskeletal, skin, Neurological) was performed and found to be negative except as described in this note.      Physical Exam:   Vitals: /88   Pulse 64   Resp 16   Wt 87.5 kg (193 lb)   BMI 28.50 kg/m     General: Seated comfortably in no acute distress.  HEENT: Optic discs sharp and vasculature normal on funduscopic exam.   Heart: Regular rate  Lungs: breathing comfortably  Extremities: no edema  Skin: No rashes  Neurologic:     Mental Status: Fully alert, attentive and oriented. Normal memory and fund of knowledge. Language normal, speech  clear and fluent, no paraphasic errors.     Cranial Nerves: Visual fields intact. PERRL. EOMI with normal smooth pursuit. Facial sensation intact/symmetric. Facial movements symmetric. Hearing not formally tested but intact to conversation. Palate elevation symmetric, uvula midline. No dysarthria. Shoulder shrug strong bilaterally. Tongue protrusion midline.     Motor: No tremors or other abnormal movements observed. Muscle tone normal throughout. No pronator drift. Normal/symmetric rapid finger tapping. Strength 5/5 throughout upper and lower extremities.     Deep Tendon Reflexes: 2+/symmetric throughout upper and lower extremities. No clonus. Toes downgoing bilaterally.     Sensory: Intact/symmetric to light touch, temperature, vibration throughout upper and lower extremities. Negative Romberg.      Coordination: Finger-nose-finger and heel-shin intact without dysmetria. Rapid alternating movements intact/symmetric with normal speed and rhythm.     Gait: Normal, steady casual gait. Able to walk on toes, heels and tandem without difficulty.         Data: Pertinent prior to visit   Imaging:  None    Procedures:  None    Laboratory:  None         Assessment and Plan:   Assessment:  Darius Bennett is a 31 year old male who presents today for evaluation of headaches. Headaches have been occurring in cycles every 1-3 years since 2009, lasting from several weeks to 2 month. Headaches are unilateral on the left with associated left drooping of the eyelid, nasal congestion, eye redness. Headaches last from 20 minutes to several hours. Neurological exam is normal. Previous MRI brain in 2016 was reportedly normal. Headaches are consistent with cluster headaches. We discussed treatment with prednisone burst, verapamil, and prn imitrex injection.      Plan:  - Prednisone 60 mg daily for 5 day; decrease by 10 mg daily until tapered off prednisone  - Verapamil 80 mg TID; MyChart message me in 1 week if no improvement in  headaches and we can increase dosage  - Imitrex 6 mg subcutaneous injection prn    Follow up in Neurology clinic in 4 weeks or earlier as needed should new concerns arise.     Artis Ramírez MD   of Neurology  [facility]      The total time of this encounter amounted to 45 minutes. This time included time spent with the patient, prep work, ordering tests, and performing post visit documentation.

## 2021-04-07 ENCOUNTER — OFFICE VISIT (OUTPATIENT)
Dept: NEUROLOGY | Facility: CLINIC | Age: 31
End: 2021-04-07
Attending: FAMILY MEDICINE
Payer: OTHER GOVERNMENT

## 2021-04-07 VITALS
RESPIRATION RATE: 16 BRPM | BODY MASS INDEX: 28.5 KG/M2 | HEART RATE: 64 BPM | DIASTOLIC BLOOD PRESSURE: 88 MMHG | WEIGHT: 193 LBS | SYSTOLIC BLOOD PRESSURE: 135 MMHG

## 2021-04-07 DIAGNOSIS — G44.011 INTRACTABLE EPISODIC CLUSTER HEADACHE: Primary | ICD-10-CM

## 2021-04-07 PROCEDURE — 99204 OFFICE O/P NEW MOD 45 MIN: CPT | Performed by: INTERNAL MEDICINE

## 2021-04-07 RX ORDER — VERAPAMIL HYDROCHLORIDE 80 MG/1
80 TABLET ORAL 3 TIMES DAILY
Qty: 90 TABLET | Refills: 1 | Status: SHIPPED | OUTPATIENT
Start: 2021-04-07 | End: 2021-05-05

## 2021-04-07 RX ORDER — CEFUROXIME AXETIL 250 MG/1
6 TABLET ORAL
Qty: 15 ML | Refills: 1 | Status: SHIPPED | OUTPATIENT
Start: 2021-04-07 | End: 2021-05-05

## 2021-04-07 RX ORDER — PREDNISONE 10 MG/1
TABLET ORAL
Qty: 45 TABLET | Refills: 0 | Status: SHIPPED | OUTPATIENT
Start: 2021-04-07 | End: 2021-05-05

## 2021-04-07 NOTE — LETTER
4/7/2021         RE: Darius Bennett  [address] [facility] [address]        Dear Colleague,    Thank you for referring your patient, Darius Bennett, to the [facility] [place]. Please see a copy of my visit note below.    [facility] Neurology Consultation    Darius Bennett MRN# [id number]   Age: 31 year old YOB: 1990     Requesting physician: Katy Delacruz     Reason for Consultation: headaches      History of Presenting Symptoms:   Darius Bennett is a 31 year old male who presents today for evaluation of headaches.     Patient has previous diagnosis of cluster headaches. Headache cycles started around 2009. He used to get them every year and they would last for several weeks. Now he is getting them every several years and they last for several months. He had a headache cycle in 2018 that lasted for 2 months. He is currently in a cycle that started within the last month.     Pain is always in the left temple behind the eye. It is a stabbing, excruciating pain. He uses an ice pack on his head. He gets congestion in the nose. He sometimes gets a drooping eyelid on the left. He has redness in the eye. He is pacing around the room. Attacks can last from 20 minutes to 3 hours. This morning headache lasted 20-30 minutes. He had a headache last night lasting several hours. Generally he gets 1-3 headaches per day during the cycles.    Intranasal sumatriptan will last around half of the time.     Alcohol can trigger the headaches. Overheating can also trigger a headache.     Cycles can happen any time of year. Every cycle is a little bit different. Often times they happen around the same time at night.     MRI brain was completed in 2016 when cluster headaches were diagnosed. He was told that this was normal.       Past Medical History:     Patient Active Problem List   Diagnosis     Cluster headaches     IBS (irritable bowel  syndrome)     Past Medical History:   Diagnosis Date     Cluster headaches      IBS (irritable bowel syndrome)         Past Surgical History:     Past Surgical History:   Procedure Laterality Date     Compartment release Bilateral 2007    He reports compartment syndrome in both lower legs for which he had surgery        Social History:     Social History     Tobacco Use     Smoking status: Never Smoker     Smokeless tobacco: Former User   Substance Use Topics     Alcohol use: Yes     Comment: occasional      Drug use: No        Family History:     Family History   Problem Relation Age of Onset     Diabetes Paternal Grandmother      Kidney Cancer Paternal Grandfather         Medications:     Current Outpatient Medications   Medication Sig     Multiple Vitamins-Minerals (MULTIVITAMIN ADULT PO)      naproxen (NAPROSYN) 500 MG tablet Take 1 tablet (500 mg) by mouth 2 times daily (with meals)     Omega-3 Fatty Acids (FISH OIL OMEGA-3 PO)      order for DME Equipment being ordered: Oxygen (Patient not taking: Reported on 7/31/2019)     SUMAtriptan (IMITREX) 20 MG/ACT nasal spray Spray 1 spray in nostril as needed for migraine May repeat in 2 hours. Max 2 sprays/24 hours.     verapamil (CALAN-SR) 120 MG TBCR CR tablet Take 1 tablet (120 mg) by mouth At Bedtime (Patient not taking: Reported on 2/3/2021)     verapamil ER (CALAN-SR) 120 MG CR tablet Take 1 tablet (120 mg) by mouth At Bedtime     No current facility-administered medications for this visit.         Allergies:   No Known Allergies     Review of Systems:   A comprehensive 10 point review of systems (constitutional, ENT, cardiac, peripheral vascular, lymphatic, respiratory, GI, , Musculoskeletal, skin, Neurological) was performed and found to be negative except as described in this note.      Physical Exam:   Vitals: /88   Pulse 64   Resp 16   Wt 87.5 kg (193 lb)   BMI 28.50 kg/m     General: Seated comfortably in no acute distress.  HEENT: Optic discs  sharp and vasculature normal on funduscopic exam.   Heart: Regular rate  Lungs: breathing comfortably  Extremities: no edema  Skin: No rashes  Neurologic:     Mental Status: Fully alert, attentive and oriented. Normal memory and fund of knowledge. Language normal, speech clear and fluent, no paraphasic errors.     Cranial Nerves: Visual fields intact. PERRL. EOMI with normal smooth pursuit. Facial sensation intact/symmetric. Facial movements symmetric. Hearing not formally tested but intact to conversation. Palate elevation symmetric, uvula midline. No dysarthria. Shoulder shrug strong bilaterally. Tongue protrusion midline.     Motor: No tremors or other abnormal movements observed. Muscle tone normal throughout. No pronator drift. Normal/symmetric rapid finger tapping. Strength 5/5 throughout upper and lower extremities.     Deep Tendon Reflexes: 2+/symmetric throughout upper and lower extremities. No clonus. Toes downgoing bilaterally.     Sensory: Intact/symmetric to light touch, temperature, vibration throughout upper and lower extremities. Negative Romberg.      Coordination: Finger-nose-finger and heel-shin intact without dysmetria. Rapid alternating movements intact/symmetric with normal speed and rhythm.     Gait: Normal, steady casual gait. Able to walk on toes, heels and tandem without difficulty.         Data: Pertinent prior to visit   Imaging:  None    Procedures:  None    Laboratory:  None         Assessment and Plan:   Assessment:  Darius Bennett is a 31 year old male who presents today for evaluation of headaches. Headaches have been occurring in cycles every 1-3 years since 2009, lasting from several weeks to 2 month. Headaches are unilateral on the left with associated left drooping of the eyelid, nasal congestion, eye redness. Headaches last from 20 minutes to several hours. Neurological exam is normal. Previous MRI brain in 2016 was reportedly normal. Headaches are consistent with cluster  headaches. We discussed treatment with prednisone burst, verapamil, and prn imitrex injection.      Plan:  - Prednisone 60 mg daily for 5 day; decrease by 10 mg daily until tapered off prednisone  - Verapamil 80 mg TID; MyChart message me in 1 week if no improvement in headaches and we can increase dosage  - Imitrex 6 mg subcutaneous injection prn    Follow up in Neurology clinic in 4 weeks or earlier as needed should new concerns arise.     Artis Ramírez MD   of Neurology  [facility]      The total time of this encounter amounted to 45 minutes. This time included time spent with the patient, prep work, ordering tests, and performing post visit documentation.        Again, thank you for allowing me to participate in the care of your patient.        Sincerely,        Artis Ramírez MD

## 2021-05-05 ENCOUNTER — OFFICE VISIT (OUTPATIENT)
Dept: FAMILY MEDICINE | Facility: CLINIC | Age: 31
End: 2021-05-05
Payer: OTHER GOVERNMENT

## 2021-05-05 ENCOUNTER — ANCILLARY PROCEDURE (OUTPATIENT)
Dept: GENERAL RADIOLOGY | Facility: CLINIC | Age: 31
End: 2021-05-05
Attending: NURSE PRACTITIONER
Payer: OTHER GOVERNMENT

## 2021-05-05 VITALS
SYSTOLIC BLOOD PRESSURE: 132 MMHG | RESPIRATION RATE: 18 BRPM | HEIGHT: 69 IN | DIASTOLIC BLOOD PRESSURE: 74 MMHG | OXYGEN SATURATION: 96 % | HEART RATE: 81 BPM | BODY MASS INDEX: 29.12 KG/M2 | WEIGHT: 196.6 LBS | TEMPERATURE: 97.5 F

## 2021-05-05 DIAGNOSIS — R20.2 NUMBNESS AND TINGLING OF BOTH FEET: ICD-10-CM

## 2021-05-05 DIAGNOSIS — R20.0 NUMBNESS AND TINGLING OF BOTH FEET: ICD-10-CM

## 2021-05-05 DIAGNOSIS — M79.669 PAIN IN SHIN, UNSPECIFIED LATERALITY: ICD-10-CM

## 2021-05-05 DIAGNOSIS — M79.669 PAIN IN SHIN, UNSPECIFIED LATERALITY: Primary | ICD-10-CM

## 2021-05-05 PROCEDURE — 73590 X-RAY EXAM OF LOWER LEG: CPT | Mod: RT | Performed by: RADIOLOGY

## 2021-05-05 PROCEDURE — 99213 OFFICE O/P EST LOW 20 MIN: CPT | Performed by: NURSE PRACTITIONER

## 2021-05-05 ASSESSMENT — MIFFLIN-ST. JEOR: SCORE: 1833.18

## 2021-05-05 NOTE — PROGRESS NOTES
"    Assessment & Plan     Pain in shin, unspecified laterality  Chronic, bilateral shin splints/pain. Has had compartment syndrome release bilateral in 2007. Recommend xrays of bilateral legs - will call with results and any further recommendations. Start diclofenac gel to both shins as needed. Would recommend decreasing mileage on running, ice on shins and rest as needed. Schedule with orthopedics for further evaluation.   - XR Tibia & Fibula Bilateral 2 Views; Future  - diclofenac (VOLTAREN) 1 % topical gel; Apply 4 g topically 4 times daily  - Orthopedic & Spine  Referral; Future    Numbness and tingling of both feet  Numbness and tingling of both feet with running longer distances along with the above. Follow-up with ortho as above.   - Orthopedic & Spine  Referral; Future              BMI:   Estimated body mass index is 29.24 kg/m  as calculated from the following:    Height as of this encounter: 1.746 m (5' 8.75\").    Weight as of this encounter: 89.2 kg (196 lb 9.6 oz).     See Patient Instructions    Return in about 1 month (around 6/5/2021), or if symptoms worsen or fail to improve.    Carmella Queen, JEN CNP  [facility]    Juwan Mccoy is a 31 year old who presents for the following health issues     HPI     Musculoskeletal problem/pain  Onset/Duration: years for shin splints, recently feet are going numb  Description  Location: bilateral shin pain and feet numbness   Joint Swelling: no  Redness: no  Pain: YES  Warmth: no  Intensity:  8/10  Progression of Symptoms:  waxing and waning  Accompanying signs and symptoms:   Fevers: no  Numbness/tingling/weakness: YES  History  Trauma to the area: No  in past has had compartment syndrome surgery in 2007  Recent illness:  no  Previous similar problem: YES- in the  and has symptoms after running  Previous evaluation:  YES  Precipitating or alleviating factors:  Aggravating factors include: " "running  Therapies tried and outcome: ice     Numbness intermittent over the years as well  Feet go numb now with running anything over a mile - when slows down and walks feeling will come back   Wears really good shoes, has worn orthotics. All without any improvement       Review of Systems   Constitutional, HEENT, cardiovascular, pulmonary, gi and gu systems are negative, except as otherwise noted.      Objective    /74   Pulse 81   Temp 97.5  F (36.4  C)   Resp 18   Ht 1.746 m (5' 8.75\")   Wt 89.2 kg (196 lb 9.6 oz)   SpO2 96%   BMI 29.24 kg/m    Body mass index is 29.24 kg/m .  Physical Exam   GENERAL APPEARANCE: healthy, alert and no distress  MS: extremities normal- no gross deformities noted, peripheral pulses normal, normal range of motion through out bilateral upper extremities and mild tenderness over medial shins   SKIN: no suspicious lesions or rashes    Diagnostic Test Results:  Xray - bilateral tib/fib - pending            "

## 2021-05-05 NOTE — PATIENT INSTRUCTIONS
Pain in shin, unspecified laterality  Chronic, bilateral shin splints/pain. Has had compartment syndrome release bilateral in 2007. Recommend xrays of bilateral legs - will call with results and any further recommendations. Start diclofenac gel to both shins as needed. Would recommend decreasing mileage on running, ice on shins and rest as needed. Schedule with orthopedics for further evaluation.   - XR Tibia & Fibula Bilateral 2 Views; Future  - diclofenac (VOLTAREN) 1 % topical gel; Apply 4 g topically 4 times daily  - Orthopedic & Spine  Referral; Future    Numbness and tingling of both feet  Numbness and tingling of both feet with running longer distances along with the above. Follow-up with ortho as above.   - Orthopedic & Spine  Referral; [facility]

## 2021-05-10 ENCOUNTER — MYC MEDICAL ADVICE (OUTPATIENT)
Dept: FAMILY MEDICINE | Facility: CLINIC | Age: 31
End: 2021-05-10

## 2021-05-10 NOTE — TELEPHONE ENCOUNTER
He wants his ortho referral to go the insurance company.  Need to go through [employer] web site.  He says it can't be faxed. Can call [phone] to talk to someone from [employer].  He would like a call back after done.  Dejah Palomares RN

## 2021-05-17 ENCOUNTER — OFFICE VISIT (OUTPATIENT)
Dept: FAMILY MEDICINE | Facility: CLINIC | Age: 31
End: 2021-05-17
Payer: OTHER GOVERNMENT

## 2021-05-17 VITALS
TEMPERATURE: 97.9 F | SYSTOLIC BLOOD PRESSURE: 125 MMHG | HEIGHT: 69 IN | RESPIRATION RATE: 16 BRPM | BODY MASS INDEX: 28.32 KG/M2 | HEART RATE: 55 BPM | DIASTOLIC BLOOD PRESSURE: 75 MMHG | WEIGHT: 191.2 LBS

## 2021-05-17 DIAGNOSIS — Z30.2 ENCOUNTER FOR STERILIZATION: Primary | ICD-10-CM

## 2021-05-17 PROCEDURE — 99213 OFFICE O/P EST LOW 20 MIN: CPT | Performed by: FAMILY MEDICINE

## 2021-05-17 RX ORDER — OXYCODONE AND ACETAMINOPHEN 5; 325 MG/1; MG/1
1 TABLET ORAL EVERY 6 HOURS PRN
Qty: 12 TABLET | Refills: 0 | Status: SHIPPED | OUTPATIENT
Start: 2021-05-17 | End: 2021-05-20

## 2021-05-17 RX ORDER — DIAZEPAM 10 MG
10 TABLET ORAL EVERY 6 HOURS PRN
Qty: 1 TABLET | Refills: 0 | Status: SHIPPED | OUTPATIENT
Start: 2021-05-17 | End: 2021-12-02

## 2021-05-17 ASSESSMENT — PAIN SCALES - GENERAL: PAINLEVEL: NO PAIN (0)

## 2021-05-17 ASSESSMENT — MIFFLIN-ST. JEOR: SCORE: 1808.69

## 2021-05-17 NOTE — TELEPHONE ENCOUNTER
I have been inpt with Covid. First day back today. I issued the  auth for Carlos Manuel to see Bruna Erik starting today Reference #[id number] Sorry but I am alone so have to back up.    [place]

## 2021-05-17 NOTE — PROGRESS NOTES
"SUBJECTIVE:                                                    Darius Bennett is a 31 year old male who presents to clinic today for the following health issues:    Chief Complaint   Patient presents with     Consult For     vasectomy     Problem list and histories reviewed & adjusted, as indicated.  Additional history:     Patient Active Problem List   Diagnosis     Cluster headaches     IBS (irritable bowel syndrome)     Past Surgical History:   Procedure Laterality Date     Compartment release Bilateral 2007    He reports compartment syndrome in both lower legs for which he had surgery       Social History     Tobacco Use     Smoking status: Never Smoker     Smokeless tobacco: Former User   Substance Use Topics     Alcohol use: Yes     Comment: occasional      Family History   Problem Relation Age of Onset     Diabetes Paternal Grandmother      Kidney Cancer Paternal Grandfather          Current Outpatient Medications   Medication Sig Dispense Refill     Multiple Vitamins-Minerals (MULTIVITAMIN ADULT PO)        Omega-3 Fatty Acids (FISH OIL OMEGA-3 PO)        diclofenac (VOLTAREN) 1 % topical gel Apply 4 g topically 4 times daily (Patient not taking: Reported on 5/17/2021) 350 g 0     Allergies   Allergen Reactions     No Known Allergies        ROS:  Constitutional, HEENT, cardiovascular, pulmonary, gi and gu systems are negative, except as otherwise noted.    OBJECTIVE:                                                    /75   Pulse 55   Temp 97.9  F (36.6  C) (Tympanic)   Resp 16   Ht 1.746 m (5' 8.75\")   Wt 86.7 kg (191 lb 3.2 oz)   BMI 28.44 kg/m   Body mass index is 28.44 kg/m .   GENERAL: healthy, alert, well nourished, well hydrated, no distress  HENT: ear canals- normal; TMs- normal; Nose- normal; Mouth- no ulcers, no lesions  NECK: no tenderness, no adenopathy, no asymmetry, no masses, no stiffness; thyroid- normal to palpation  RESP: lungs clear to auscultation - no rales, no rhonchi, " no wheezes  CV: regular rates and rhythm, normal S1 S2, no S3 or S4 and no murmur, no click or rub -  ABDOMEN: soft, no tenderness, no  hepatosplenomegaly, no masses, normal bowel sounds       ASSESSMENT/PLAN:                                                      SUBJECTIVE:  .otilia comes in for a vasectomy consultation.  He and his partner have decided they don't want to have any more children. They have decided that he will have the sterilization procedure instead of her.  Patient was given information to read prior to the consultation.      We talked about the risks and benefits of the vasectomy; risks being that of potential for bleeding, infection, postoperative discomfort immediately which can last for a number of weeks afterwards, also the development of spermatoceles or sperm granulomas, epididymal cysts and the possibility of failure of the procedure producing failed attempt at sterility.     Also mentioned to the patient that there is some literature out there that suggests men who have vasectomies are at increased risk for prostate cancer; however, this literature is inconclusive and controversial.  It is recommended that men who have vasectomies should have annual prostate exams through the rectum yearly after age 40 and also may benefit from a screening prostatic specific antigen test after age 40.  We also discussed signs and symptoms of prostatic enlargement or prostatic problems.     I went through the procedure with the patient, how it is done, a midline incision in the scrotum measuring approximately 1/2 inch in length.  The vas deferens is brought up through this incision, dissected free and a small portion, maybe 0.5 cm. in length is removed.   cauterized and then the proximal or distal end is buried away from the other.  Patient had no questions when it came to the procedure itself.  I did show him pictures and diagrams of the procedure.  I showed him where a potential spermatocele or sperm  granuloma can occur.      Again, the patient had no further questions for me.    OBJECTIVE:  On exam, this is a well-developed, well-nourished white male.      Exam reveals a normal circumcised penis, no lesions.  Testes are descended bilaterally.  Exam was limited to the genitalia.  Both vas deferens were easily identified.  No other abnormalities were noted.      ASSESSMENT:  Undesired fertility in an adult male, requesting vasectomy.    PLAN:  He will schedule a vasectomy at his convenience for either the last appointment of the morning He is aware that he will need to take the entire weekend off and have essentially bedrest for two days with ice packs every two hours and ibuprofen for discomfort.  I gave him a prescription for ibuprofen 800 mg. to take every six to eight hours with food after the procedure.  He will take one tablet half an hour before the procedure along with Valium 10 mg.    If he has any further questions, he will contact me prior to the procedure or ask me on the day of the procedure.  He also is aware that he will need to bring a specimen after 10-12 ejaculations to make sure that he has cleared the storage vesicles of any residual sperm and to make sure that he is sterile.        reports that he has never smoked. He has quit using smokeless tobacco.      Weight management plan: Discussed healthy diet and exercise guidelines      [facility]

## 2021-05-19 ENCOUNTER — OFFICE VISIT (OUTPATIENT)
Dept: ORTHOPEDICS | Facility: CLINIC | Age: 31
End: 2021-05-19
Payer: OTHER GOVERNMENT

## 2021-05-19 VITALS
HEIGHT: 69 IN | WEIGHT: 190 LBS | BODY MASS INDEX: 28.14 KG/M2 | SYSTOLIC BLOOD PRESSURE: 134 MMHG | DIASTOLIC BLOOD PRESSURE: 84 MMHG

## 2021-05-19 DIAGNOSIS — R20.0 NUMBNESS AND TINGLING OF BOTH FEET: ICD-10-CM

## 2021-05-19 DIAGNOSIS — M79.605 LEFT LEG PAIN: Primary | ICD-10-CM

## 2021-05-19 DIAGNOSIS — R20.2 NUMBNESS AND TINGLING OF BOTH FEET: ICD-10-CM

## 2021-05-19 PROCEDURE — 99204 OFFICE O/P NEW MOD 45 MIN: CPT | Performed by: PEDIATRICS

## 2021-05-19 ASSESSMENT — MIFFLIN-ST. JEOR: SCORE: 1807.21

## 2021-05-19 NOTE — LETTER
"    5/19/2021         RE: Darius Bennett  [address] [address]        Dear Colleague,    Thank you for referring your patient, Darius Bennett, to the [facility] [place]. Please see a copy of my visit note below.    ASSESSMENT & PLAN    Darius was seen today for pain and pain.    Diagnoses and all orders for this visit:    Left leg pain  -     MR Tibia Fibula Lower Leg Left wo Contrast; Future    Numbness and tingling of both feet      This issue is chronic and Unchanged.    We discussed these other possible diagnosis: stress fracture, nerve impingement, compartment syndrome.  We will start with MRI to evaluate stress injury, obtain records from [facility].  Consider other options pending clinical course.    Plan:  - Today's Plan of Care:  MRI of the left tib/fib - Call [phone] to schedule MRI  Discussed rest from running, pain free cross training  Will obtain records from [facility]    -We also discussed other future treatment options:  Referral to Physical Therapy  EMG    Follow Up: In clinic with Dr. Valencia after MRI (wait at least 1-2 days)    Concerning signs and symptoms were reviewed.  The patient expressed understanding of this management plan and all questions were answered at this time.    Thanks for the opportunity to participate in the care of this patient, I will keep you updated on their progress.    CC: VISHNU Jones MD [facility]  Sports Medicine Physician  [facility]      -----  Chief Complaint   Patient presents with     Right Lower Leg - Pain     Left Lower Leg - Pain       SUBJECTIVE  Darius Bennett is a/an 31 year old male who is seen in referral from Carmella Queen CNP for evaluation of bilateral low leg pain.  He has had a very long history of chronic low leg pain. He experiences pain, numbness and tingling, sometimes has \"pressure pain.\"  -He has a history of CEC syndrome, pain in anterior " "compartments/shins, surgery at [facility]  - He first started noticing his low legs again ~ 1 month ago and it is progressively getting worse. He is running at 2-3 days a week at approximately 2-5 miles at a time.  - He has shin pain, left worse than right, takes time off and improves, pain comes back with running.  - His feet are numb when he runs 2-3 miles. Reports his whole foot is numb, this has been going on a while. No radiating leg pain. No low back pain  - Has not done PT in a while.    The patient is seen by themselves.    Onset:  years(s) ago. Reports insidious onset without acute precipitating event.  Location of Pain: bilateral, primarily left sided  Worsened by: running, will have pain with walking if he runs enough  Better with: foam rolling, ice bath  Treatments tried: rest/activity avoidance, elevation  Associated symptoms: numbness, tingling and redness    Orthopedic/Surgical history: YES - Date: 2007, fasciotomy bilaterally  Social History/Occupation: full time [employer],     No family history pertinent to patient's problem today.    REVIEW OF SYSTEMS:  Review of Systems  Skin: no bruising, no swelling  Musculoskeletal: as above  Neurologic: yes numbness, paresthesias  Remainder of review of systems is negative including constitutional, CV, pulmonary, GI, except as noted in HPI or medical history.    OBJECTIVE:  /84   Ht 1.753 m (5' 9\")   Wt 86.2 kg (190 lb)   BMI 28.06 kg/m     General: healthy, alert and in no distress  HEENT: no scleral icterus or conjunctival erythema  Skin: no suspicious lesions or rash. No jaundice.  CV: distal perfusion intact  Resp: normal respiratory effort without conversational dyspnea   Psych: normal mood and affect  Gait: normal steady gait with appropriate coordination and balance  Neuro: Normal light sensory exam of lower extremity    Bilateral Foot and Ankle Exam:    Inspection:       no visible ecchymosis       no visible edema or effusion    Foot " inspection:       no deformity noted    Tender:       Medial border distal tibia L > R    Non-Tender:       remainder of ankle and foot bilateral    ROM:        Full active and passive ROM, ankle dorsiflexion, plantarflexion, inversion, eversion, great toe dorsiflexion, remainder of toes, midfoot and subtalar bilateral    Strength:       ankle dorsiflexion 5/5 bilateral       plantarflexion 5/5 bilateral       inversion 5/5 bilateral       eversion 5/5 bilateral    Special Tests:       neg (-) anterior drawer bilateral       neg (-) talar tilt bilateral       neg (-) external rotation testing bilateral       neg (-) squeeze test bilateral       Neg (-) Tinel at fibular head  - neg SLR bilaterally    Gait:       normal    Lower extremity alignment:       Normal    Neurovascular:       2+ peripheral pulses bilaterally and brisk capillary refill       sensation grossly intact      RADIOLOGY:  I independently visualized and reviewed these images with the patient  2 XR views of bilateral tib/fib reviewed 5/5/2021: no acute bony abnormality, no significant degenerative change  - will follow official read    Review of prior external note(s) from - primary care  Review of the result(s) of each unique test - XR           Again, thank you for allowing me to participate in the care of your patient.        Sincerely,        Bruna Valencia MD

## 2021-05-19 NOTE — PATIENT INSTRUCTIONS
We discussed these other possible diagnosis: stress fracture, nerve impingement, compartment syndrome    Plan:  - Today's Plan of Care:  MRI of the left tib/fib - Call [phone] to schedule MRI  Discussed rest from running, pain free cross training  Will obtain records from [facility]    -We also discussed other future treatment options:  Referral to Physical Therapy  EMG    Follow Up: In clinic with Dr. Valencia after MRI (wait at least 1-2 days)    If you have any further questions for your physician or physician s care team you can call [phone] and use option 3 to leave a voice message. Calls received during business hours will be returned same day.

## 2021-05-19 NOTE — PROGRESS NOTES
"ASSESSMENT & PLAN    Darius was seen today for pain and pain.    Diagnoses and all orders for this visit:    Left leg pain  -     MR Tibia Fibula Lower Leg Left wo Contrast; Future    Numbness and tingling of both feet      This issue is chronic and Unchanged.    We discussed these other possible diagnosis: stress fracture, nerve impingement, compartment syndrome.  We will start with MRI to evaluate stress injury, obtain records from [facility].  Consider other options pending clinical course.    Plan:  - Today's Plan of Care:  MRI of the left tib/fib - Call [phone] to schedule MRI  Discussed rest from running, pain free cross training  Will obtain records from [facility]    -We also discussed other future treatment options:  Referral to Physical Therapy  EMG    Follow Up: In clinic with Dr. Valencia after MRI (wait at least 1-2 days)    Concerning signs and symptoms were reviewed.  The patient expressed understanding of this management plan and all questions were answered at this time.    Thanks for the opportunity to participate in the care of this patient, I will keep you updated on their progress.    CC: VISHNU Jones MD [facility]  Sports Medicine Physician  [facility]      -----  Chief Complaint   Patient presents with     Right Lower Leg - Pain     Left Lower Leg - Pain       SUBJECTIVE  Darius Bennett is a/an 31 year old male who is seen in referral from Carmella Queen CNP for evaluation of bilateral low leg pain.  He has had a very long history of chronic low leg pain. He experiences pain, numbness and tingling, sometimes has \"pressure pain.\"  -He has a history of CEC syndrome, pain in anterior compartments/shins, surgery at [facility]  - He first started noticing his low legs again ~ 1 month ago and it is progressively getting worse. He is running at 2-3 days a week at approximately 2-5 miles at a time.  - He has shin pain, left worse than right, takes time off and " "improves, pain comes back with running.  - His feet are numb when he runs 2-3 miles. Reports his whole foot is numb, this has been going on a while. No radiating leg pain. No low back pain  - Has not done PT in a while.    The patient is seen by themselves.    Onset:  years(s) ago. Reports insidious onset without acute precipitating event.  Location of Pain: bilateral, primarily left sided  Worsened by: running, will have pain with walking if he runs enough  Better with: foam rolling, ice bath  Treatments tried: rest/activity avoidance, elevation  Associated symptoms: numbness, tingling and redness    Orthopedic/Surgical history: YES - Date: 2007, fasciotomy bilaterally  Social History/Occupation: full time [employer],     No family history pertinent to patient's problem today.    REVIEW OF SYSTEMS:  Review of Systems  Skin: no bruising, no swelling  Musculoskeletal: as above  Neurologic: yes numbness, paresthesias  Remainder of review of systems is negative including constitutional, CV, pulmonary, GI, except as noted in HPI or medical history.    OBJECTIVE:  /84   Ht 1.753 m (5' 9\")   Wt 86.2 kg (190 lb)   BMI 28.06 kg/m     General: healthy, alert and in no distress  HEENT: no scleral icterus or conjunctival erythema  Skin: no suspicious lesions or rash. No jaundice.  CV: distal perfusion intact  Resp: normal respiratory effort without conversational dyspnea   Psych: normal mood and affect  Gait: normal steady gait with appropriate coordination and balance  Neuro: Normal light sensory exam of lower extremity    Bilateral Foot and Ankle Exam:    Inspection:       no visible ecchymosis       no visible edema or effusion    Foot inspection:       no deformity noted    Tender:       Medial border distal tibia L > R    Non-Tender:       remainder of ankle and foot bilateral    ROM:        Full active and passive ROM, ankle dorsiflexion, plantarflexion, inversion, eversion, great toe dorsiflexion, " remainder of toes, midfoot and subtalar bilateral    Strength:       ankle dorsiflexion 5/5 bilateral       plantarflexion 5/5 bilateral       inversion 5/5 bilateral       eversion 5/5 bilateral    Special Tests:       neg (-) anterior drawer bilateral       neg (-) talar tilt bilateral       neg (-) external rotation testing bilateral       neg (-) squeeze test bilateral       Neg (-) Tinel at fibular head  - neg SLR bilaterally    Gait:       normal    Lower extremity alignment:       Normal    Neurovascular:       2+ peripheral pulses bilaterally and brisk capillary refill       sensation grossly intact      RADIOLOGY:  I independently visualized and reviewed these images with the patient  2 XR views of bilateral tib/fib reviewed 5/5/2021: no acute bony abnormality, no significant degenerative change  - will follow official read    Review of prior external note(s) from - primary care  Review of the result(s) of each unique test - XR

## 2021-05-26 ENCOUNTER — HOSPITAL ENCOUNTER (OUTPATIENT)
Dept: MRI IMAGING | Facility: CLINIC | Age: 31
Discharge: HOME | End: 2021-05-26
Attending: PEDIATRICS | Admitting: PEDIATRICS
Payer: OTHER GOVERNMENT

## 2021-05-26 DIAGNOSIS — M79.605 LEFT LEG PAIN: ICD-10-CM

## 2021-05-26 PROCEDURE — 73718 MRI LOWER EXTREMITY W/O DYE: CPT | Mod: 26 | Performed by: RADIOLOGY

## 2021-05-26 PROCEDURE — 73718 MRI LOWER EXTREMITY W/O DYE: CPT | Mod: LT

## 2021-05-27 ENCOUNTER — TELEPHONE (OUTPATIENT)
Dept: ORTHOPEDICS | Facility: CLINIC | Age: 31
End: 2021-05-27

## 2021-05-27 NOTE — TELEPHONE ENCOUNTER
We had requested records from [facility] at last appointment 5/19 - have these arrived?  Thanks  Bruna Valencia MD

## 2021-06-02 ENCOUNTER — VIRTUAL VISIT (OUTPATIENT)
Dept: ORTHOPEDICS | Facility: CLINIC | Age: 31
End: 2021-06-02
Payer: OTHER GOVERNMENT

## 2021-06-02 DIAGNOSIS — R20.0 NUMBNESS AND TINGLING OF BOTH FEET: Primary | ICD-10-CM

## 2021-06-02 DIAGNOSIS — R20.2 NUMBNESS AND TINGLING OF BOTH FEET: Primary | ICD-10-CM

## 2021-06-02 DIAGNOSIS — M79.605 LEFT LEG PAIN: ICD-10-CM

## 2021-06-02 PROCEDURE — 99212 OFFICE O/P EST SF 10 MIN: CPT | Mod: 95 | Performed by: PEDIATRICS

## 2021-06-02 NOTE — LETTER
6/2/2021         RE: Darius Bennett  [address] [address]        Dear Colleague,    Thank you for referring your patient, Darius Bennett, to the [facility] [place]. Please see a copy of my visit note below.    Darius is a 31 year old who is being evaluated via a billable telephone visit.      What phone number would you like to be contacted at? [phone]      How would you like to obtain your AVS? MyChart  Phone call duration: 6 minutes      ASSESSMENT & PLAN    Diagnoses and all orders for this visit:    Numbness and tingling of both feet  -     PHYSICAL THERAPY REFERRAL; Future    Left leg pain  -     PHYSICAL THERAPY REFERRAL; Future      This issue is acute on chronic and Unchanged.    Plan:  - Today's Plan of Care:  Continue to rest from running, pain free cross training is ok  Rehab: Physical Therapy: [facility] - [phone]    -We also discussed other future treatment options:  Compartment syndrome testing  EMG    Follow Up: 1 month in clinic for repeat exam    Concerning signs and symptoms were reviewed.  The patient expressed understanding of this management plan and all questions were answered at this time.    Bruna Valencia MD [facility]  Sports Medicine Physician  [facility]      SUBJECTIVE- Interim History June 2, 2021    No chief complaint on file.      Darius Bennett is a 31 year old male who is seen in f/u up for    Numbness and tingling of both feet  Left leg pain.  Since last visit on 5/19/21 patient has been feeling the same.   He states he has decreased his running and have started biking more, which helps. Is worried because he's done this in the past and pain returned.  - Now ~ 4 weeks from return of low leg pain    Worsened by: running, increase in physical impact activity  Better with: rest, ice  Treatments tried: rest/activity avoidance and elevation  Associated symptoms:  numbness, tingling and pain  with running and touching it.    The patient is seen by themselves.    Orthopedic/Surgical history: YES - Date: 2007 fasciotomy, bilaterally  Social History/Occupation: full time [employer],     No family history pertinent to patient's problem today.    REVIEW OF SYSTEMS:  Review of Systems  Skin: no bruising, no swelling  Musculoskeletal: as above  Neurologic: yes (with running) numbness, paresthesias  Remainder of review of systems is negative including constitutional, CV, pulmonary, GI, except as noted in HPI or medical history.      RADIOLOGY:  Final results and radiologist's interpretation, available in the [facility] health record.  Images were reviewed with the patient in the office today.  My personal interpretation of the performed imaging: MRI left tib/fib - concern for bilateral stress reactions    Results for orders placed or performed during the hospital encounter of 05/26/21   MR Tibia Fibula Lower Leg Left wo Contrast    Narrative    MR left tibia/fibula without contrast 5/27/2021 7:17 AM    Techniques: Multiplanar multisequence imaging of the left tibia/fibula  was obtained without  administration of intra-articular or intravenous  contrast using routing protocol.    History: eval stress fracture left; Left leg pain    Comparison: 5/5/2021    Findings:    A marker is placed at anteromedial aspect of left leg approximately  8.1 cm above the ankle joint line.    Heterogeneous fat suppression, particularly at the level of the mid  calf bilaterally compromising assessment of the segment, which is  located approximately 5 cm above the level of marker.    Osseous structures    Stress fracture: Intramedullary T1 hypointensity and T2 hypointensity  without intracortical signal abnormality involving right distal third  to the shaft corresponding to the level of the marker but  contralateral to the symptomatic side.    Right greater than the left T2 hyperintense signal without associated  T1  hyperintensity of the bilateral distal fibula just above the ankle  joint, this may represent either low-grade stress reaction or areas of  red marrow.    Osseous structures: No fracture, avascular necrosis, or focal osseous  lesion is seen.    Muscles  Muscles: The visualized muscles are unremarkable without edema or  atrophy.    Joint/Periarticular soft tissue    Internal derangement of joint assessment are limited owing to chosen  field of view.    Other Findings:  None.      Impression    Impression:  1. No stress reaction corresponding to the marker on the left leg.  2. Imaging finding can be compatible with Fredericson grade 3 medial  tibial stress syndrome of the right tibia at the level of the marker.  Please correlate clinically.  3. Possible areas of grade 1 stress reaction of distal fibula just  above the ankle joint bilaterally.    TAMARA ZHU         Review of the result(s) of each unique test - MRI           Again, thank you for allowing me to participate in the care of your patient.        Sincerely,        Bruna Valencia MD

## 2021-06-02 NOTE — PATIENT INSTRUCTIONS
Plan:  - Today's Plan of Care:  Continue to rest from running, pain free cross training is ok  Rehab: Physical Therapy: [facility] - [phone]    -We also discussed other future treatment options:  Compartment syndrome testing  EMG    Follow Up: 1 month in clinic for repeat exam    If you have any further questions for your physician or physician s care team you can call [phone] and use option 3 to leave a voice message. Calls received during business hours will be returned same day.

## 2021-06-02 NOTE — PROGRESS NOTES
Darius is a 31 year old who is being evaluated via a billable telephone visit.      What phone number would you like to be contacted at? [phone]      How would you like to obtain your AVS? Sarbjitjuansingh  Phone call duration: 6 minutes      ASSESSMENT & PLAN    Diagnoses and all orders for this visit:    Numbness and tingling of both feet  -     PHYSICAL THERAPY REFERRAL; Future    Left leg pain  -     PHYSICAL THERAPY REFERRAL; Future      This issue is acute on chronic and Unchanged.    Plan:  - Today's Plan of Care:  Continue to rest from running, pain free cross training is ok  Rehab: Physical Therapy: [facility] - [phone]    -We also discussed other future treatment options:  Compartment syndrome testing  EMG    Follow Up: 1 month in clinic for repeat exam    Concerning signs and symptoms were reviewed.  The patient expressed understanding of this management plan and all questions were answered at this time.    Bruna Valencia MD [facility]  Sports Medicine Physician  [facility]      SUBJECTIVE- Interim History June 2, 2021    No chief complaint on file.      Darius Bennett is a 31 year old male who is seen in f/u up for    Numbness and tingling of both feet  Left leg pain.  Since last visit on 5/19/21 patient has been feeling the same.   He states he has decreased his running and have started biking more, which helps. Is worried because he's done this in the past and pain returned.  - Now ~ 4 weeks from return of low leg pain    Worsened by: running, increase in physical impact activity  Better with: rest, ice  Treatments tried: rest/activity avoidance and elevation  Associated symptoms:  numbness, tingling and pain with running and touching it.    The patient is seen by themselves.    Orthopedic/Surgical history: YES - Date: 2007 fasciotomy, bilaterally  Social History/Occupation: full time [employer],     No family history pertinent to patient's problem  today.    REVIEW OF SYSTEMS:  Review of Systems  Skin: no bruising, no swelling  Musculoskeletal: as above  Neurologic: yes (with running) numbness, paresthesias  Remainder of review of systems is negative including constitutional, CV, pulmonary, GI, except as noted in HPI or medical history.      RADIOLOGY:  Final results and radiologist's interpretation, available in the [facility] health record.  Images were reviewed with the patient in the office today.  My personal interpretation of the performed imaging: MRI left tib/fib - concern for bilateral stress reactions    Results for orders placed or performed during the hospital encounter of 05/26/21   MR Tibia Fibula Lower Leg Left wo Contrast    Narrative    MR left tibia/fibula without contrast 5/27/2021 7:17 AM    Techniques: Multiplanar multisequence imaging of the left tibia/fibula  was obtained without  administration of intra-articular or intravenous  contrast using routing protocol.    History: eval stress fracture left; Left leg pain    Comparison: 5/5/2021    Findings:    A marker is placed at anteromedial aspect of left leg approximately  8.1 cm above the ankle joint line.    Heterogeneous fat suppression, particularly at the level of the mid  calf bilaterally compromising assessment of the segment, which is  located approximately 5 cm above the level of marker.    Osseous structures    Stress fracture: Intramedullary T1 hypointensity and T2 hypointensity  without intracortical signal abnormality involving right distal third  to the shaft corresponding to the level of the marker but  contralateral to the symptomatic side.    Right greater than the left T2 hyperintense signal without associated  T1 hyperintensity of the bilateral distal fibula just above the ankle  joint, this may represent either low-grade stress reaction or areas of  red marrow.    Osseous structures: No fracture, avascular necrosis, or focal osseous  lesion is seen.    Muscles  Muscles: The  visualized muscles are unremarkable without edema or  atrophy.    Joint/Periarticular soft tissue    Internal derangement of joint assessment are limited owing to chosen  field of view.    Other Findings:  None.      Impression    Impression:  1. No stress reaction corresponding to the marker on the left leg.  2. Imaging finding can be compatible with Fredericson grade 3 medial  tibial stress syndrome of the right tibia at the level of the marker.  Please correlate clinically.  3. Possible areas of grade 1 stress reaction of distal fibula just  above the ankle joint bilaterally.    TAMARA ZHU         Review of the result(s) of each unique test - MRI

## 2021-06-08 ENCOUNTER — TELEPHONE (OUTPATIENT)
Dept: ORTHOPEDICS | Facility: CLINIC | Age: 31
End: 2021-06-08

## 2021-06-08 NOTE — TELEPHONE ENCOUNTER
Reason for Call:  Other     Detailed comments: Pt calling stating he needs PT referral (Dr. Valencia) sent to his insurance co. ([employer])    Phone Number Patient can be reached at: Home number on file [phone] (home)    Best Time: ANY    Can we leave a detailed message on this number? YES    Call taken on 6/8/2021 at 10:40 AM by Chris Nguyễn

## 2021-06-27 ENCOUNTER — NURSE TRIAGE (OUTPATIENT)
Dept: NURSING | Facility: CLINIC | Age: 31
End: 2021-06-27
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
## 2021-06-30 ENCOUNTER — MYC MEDICAL ADVICE (OUTPATIENT)
Dept: FAMILY MEDICINE | Facility: CLINIC | Age: 31
End: 2021-06-30

## 2021-06-30 ENCOUNTER — OFFICE VISIT (OUTPATIENT)
Dept: FAMILY MEDICINE | Facility: CLINIC | Age: 31
End: 2021-06-30
Payer: OTHER GOVERNMENT

## 2021-06-30 VITALS
RESPIRATION RATE: 16 BRPM | HEIGHT: 69 IN | BODY MASS INDEX: 28.73 KG/M2 | SYSTOLIC BLOOD PRESSURE: 130 MMHG | TEMPERATURE: 97.4 F | HEART RATE: 76 BPM | DIASTOLIC BLOOD PRESSURE: 84 MMHG | OXYGEN SATURATION: 99 % | WEIGHT: 194 LBS

## 2021-06-30 DIAGNOSIS — R10.11 RUQ ABDOMINAL PAIN: Primary | ICD-10-CM

## 2021-06-30 LAB
ALBUMIN SERPL-MCNC: 4.3 G/DL (ref 3.4–5)
ALP SERPL-CCNC: 48 U/L (ref 40–150)
ALT SERPL W P-5'-P-CCNC: 38 U/L (ref 0–70)
ANION GAP SERPL CALCULATED.3IONS-SCNC: 6 MMOL/L (ref 3–14)
AST SERPL W P-5'-P-CCNC: 22 U/L (ref 0–45)
BASOPHILS # BLD AUTO: 0 10E9/L (ref 0–0.2)
BASOPHILS NFR BLD AUTO: 0.4 %
BILIRUB SERPL-MCNC: 0.7 MG/DL (ref 0.2–1.3)
BUN SERPL-MCNC: 19 MG/DL (ref 7–30)
CALCIUM SERPL-MCNC: 8.9 MG/DL (ref 8.5–10.1)
CHLORIDE SERPL-SCNC: 105 MMOL/L (ref 94–109)
CO2 SERPL-SCNC: 28 MMOL/L (ref 20–32)
CREAT SERPL-MCNC: 1.37 MG/DL (ref 0.66–1.25)
DIFFERENTIAL METHOD BLD: NORMAL
EOSINOPHIL # BLD AUTO: 0.1 10E9/L (ref 0–0.7)
EOSINOPHIL NFR BLD AUTO: 2.1 %
ERYTHROCYTE [DISTWIDTH] IN BLOOD BY AUTOMATED COUNT: 12.4 % (ref 10–15)
GFR SERPL CREATININE-BSD FRML MDRD: 68 ML/MIN/{1.73_M2}
GLUCOSE SERPL-MCNC: 98 MG/DL (ref 70–99)
HCT VFR BLD AUTO: 43.4 % (ref 40–53)
HGB BLD-MCNC: 15 G/DL (ref 13.3–17.7)
LIPASE SERPL-CCNC: 85 U/L (ref 73–393)
LYMPHOCYTES # BLD AUTO: 1.5 10E9/L (ref 0.8–5.3)
LYMPHOCYTES NFR BLD AUTO: 27.5 %
MCH RBC QN AUTO: 30.8 PG (ref 26.5–33)
MCHC RBC AUTO-ENTMCNC: 34.6 G/DL (ref 31.5–36.5)
MCV RBC AUTO: 89 FL (ref 78–100)
MONOCYTES # BLD AUTO: 0.5 10E9/L (ref 0–1.3)
MONOCYTES NFR BLD AUTO: 8.7 %
NEUTROPHILS # BLD AUTO: 3.3 10E9/L (ref 1.6–8.3)
NEUTROPHILS NFR BLD AUTO: 61.3 %
PLATELET # BLD AUTO: 208 10E9/L (ref 150–450)
POTASSIUM SERPL-SCNC: 3.4 MMOL/L (ref 3.4–5.3)
PROT SERPL-MCNC: 7.5 G/DL (ref 6.8–8.8)
RBC # BLD AUTO: 4.87 10E12/L (ref 4.4–5.9)
SODIUM SERPL-SCNC: 139 MMOL/L (ref 133–144)
WBC # BLD AUTO: 5.3 10E9/L (ref 4–11)

## 2021-06-30 PROCEDURE — 83690 ASSAY OF LIPASE: CPT | Performed by: NURSE PRACTITIONER

## 2021-06-30 PROCEDURE — 36415 COLL VENOUS BLD VENIPUNCTURE: CPT | Performed by: NURSE PRACTITIONER

## 2021-06-30 PROCEDURE — 99214 OFFICE O/P EST MOD 30 MIN: CPT | Performed by: NURSE PRACTITIONER

## 2021-06-30 PROCEDURE — 85025 COMPLETE CBC W/AUTO DIFF WBC: CPT | Performed by: NURSE PRACTITIONER

## 2021-06-30 PROCEDURE — 80053 COMPREHEN METABOLIC PANEL: CPT | Performed by: NURSE PRACTITIONER

## 2021-06-30 ASSESSMENT — MIFFLIN-ST. JEOR: SCORE: 1825.36

## 2021-06-30 NOTE — PATIENT INSTRUCTIONS
Labs today.  Schedule ultrasound - either stop by diagnostic imaging or call [phone]  I will let you know what ultrasound shows.
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
## 2021-07-01 ENCOUNTER — HOSPITAL ENCOUNTER (OUTPATIENT)
Dept: ULTRASOUND IMAGING | Facility: CLINIC | Age: 31
Discharge: HOME | End: 2021-07-01
Attending: NURSE PRACTITIONER | Admitting: NURSE PRACTITIONER
Payer: OTHER GOVERNMENT

## 2021-07-01 DIAGNOSIS — R10.11 RUQ ABDOMINAL PAIN: ICD-10-CM

## 2021-07-01 PROCEDURE — 76705 ECHO EXAM OF ABDOMEN: CPT

## 2021-07-13 ENCOUNTER — TELEPHONE (OUTPATIENT)
Dept: ORTHOPEDICS | Facility: CLINIC | Age: 31
End: 2021-07-13

## 2021-07-13 NOTE — TELEPHONE ENCOUNTER
"Patient states: \"I have called like 4 times now. I need them to submit the referral for Physical therapy to [employer] insurance. It needs to come from the Provider.\"    I asked patient how this would get to  and he said he has a phone number for them: [phone].    Please call patient and let him know where this is at in progress...    Mary Alice Oleary RN        "

## 2021-07-20 DIAGNOSIS — R20.2 NUMBNESS AND TINGLING OF BOTH FEET: Primary | ICD-10-CM

## 2021-07-20 DIAGNOSIS — M79.605 LEFT LEG PAIN: ICD-10-CM

## 2021-07-20 DIAGNOSIS — R20.0 NUMBNESS AND TINGLING OF BOTH FEET: Primary | ICD-10-CM

## 2021-08-18 ENCOUNTER — HOSPITAL ENCOUNTER (OUTPATIENT)
Dept: PHYSICAL THERAPY | Facility: CLINIC | Age: 31
Setting detail: THERAPIES SERIES
End: 2021-08-18
Attending: PEDIATRICS
Payer: OTHER GOVERNMENT

## 2021-08-18 PROCEDURE — 97140 MANUAL THERAPY 1/> REGIONS: CPT | Mod: GP | Performed by: PHYSICAL THERAPIST

## 2021-08-18 PROCEDURE — 97110 THERAPEUTIC EXERCISES: CPT | Mod: GP | Performed by: PHYSICAL THERAPIST

## 2021-08-18 PROCEDURE — 97161 PT EVAL LOW COMPLEX 20 MIN: CPT | Mod: GP | Performed by: PHYSICAL THERAPIST

## 2021-08-23 ENCOUNTER — HOSPITAL ENCOUNTER (OUTPATIENT)
Dept: PHYSICAL THERAPY | Facility: CLINIC | Age: 31
Setting detail: THERAPIES SERIES
End: 2021-08-23
Attending: PEDIATRICS
Payer: OTHER GOVERNMENT

## 2021-08-23 DIAGNOSIS — R20.0 NUMBNESS AND TINGLING OF BOTH FEET: ICD-10-CM

## 2021-08-23 DIAGNOSIS — R20.2 NUMBNESS AND TINGLING OF BOTH FEET: ICD-10-CM

## 2021-08-23 DIAGNOSIS — M79.605 LEFT LEG PAIN: ICD-10-CM

## 2021-08-23 PROCEDURE — 97140 MANUAL THERAPY 1/> REGIONS: CPT | Mod: GP | Performed by: PHYSICAL THERAPIST

## 2021-08-23 PROCEDURE — 97110 THERAPEUTIC EXERCISES: CPT | Mod: GP | Performed by: PHYSICAL THERAPIST

## 2021-08-23 NOTE — PROGRESS NOTES
"   08/18/21 1500   General Information   Type of Visit Initial OP Ortho PT Evaluation   Start of Care Date 08/23/21   Referring Physician Dr Valencia   Orders Evaluate and Treat   Date of Order 08/09/21   Medical Diagnosis shin splints   Surgical/Medical history reviewed Yes   Precautions/Limitations no known precautions/limitations   Body Part(s)   Body Part(s) Lumbar Spine/SI;Ankle/Foot   Presentation and Etiology   Pertinent history of current problem (include personal factors and/or comorbidities that impact the POC) hx of shin splints for years.  hx of R compartment syndrome and release. running a marathon in a month. has been doing stretching, strengthening, rolling, footwear changes etc.  active .   does indicate he has had some intermitten nonradicular back pain.   Impairments A. Pain;C. Swelling;D. Decreased ROM   Functional Limitations perform desired leisure / sports activities   Symptom Location medial distal tibias   How/Where did it occur During recreation/sports   Onset date of current episode/exacerbation 08/23/18   Chronicity Chronic   Pain rating (0-10 point scale) Best (/10);Worst (/10)   Best (/10) 0   Worst (/10) 7   Pain quality A. Sharp;C. Aching   Frequency of pain/symptoms C. With activity   Pain/symptoms exacerbated by   (running)   Pain/symptoms eased by C. Rest;H. Cold;I. OTC medication(s)   Progression of symptoms since onset: Improved   Fall Risk Screen   Have you fallen 2 or more times in the past year? No   Have you fallen and had an injury in the past year? No   Is patient a fall risk? No   Ankle/Foot Objective Findings   Side (if bilateral, select both right and left) Right;Left   Posture Normal   Gait/Locomotion bilat heel whip   Foot Position In Standing mild pes planus   Ankle/Foot Strength Comments great toe ext R 4/5, hip abd R 4/5, hip flexor 4/5, hip ext L 4/5, 6\" step down R stiff ankle with heel off, mild genu valgus.     Anterior Drawer Test neg bilat   Palpation " tender at medial tibia bilat.     Accessory Motion/Joint Mobility stiff R STJ, + sup to sit, + march, + pain with lumbar ext mod stiff.  ERP R SB,    Right DF (Knee Ext) AROM 10   Right PF AROM fulll   Right Gastroc (in WB) Flexibility 20   Right Soleus (in WB) Flexibility 20   Left Gastroc (in WB) Flexibility 25   Left Soleus (in WB) Flexibility 30   Lumbar Spine/SI Objective Findings   Segmental Mobility R ant, R base post, FRS LL5 RL3   Planned Therapy Interventions   Planned Therapy Interventions stretching;strengthening;ROM;neuromuscular re-education;manual therapy;joint mobilization   Clinical Impression   Criteria for Skilled Therapeutic Interventions Met yes, treatment indicated   PT Diagnosis shin splints   Influenced by the following impairments pain   Functional limitations due to impairments running   Clinical Presentation Stable/Uncomplicated   Clinical Presentation Rationale chronic shin pain, compartment syndrome   Clinical Decision Making (Complexity) Low complexity   Therapy Frequency 1 time/week   Predicted Duration of Therapy Intervention (days/wks) 6wk   Risk & Benefits of therapy have been explained Yes   Patient, Family & other staff in agreement with plan of care Yes   Education Assessment   Preferred Learning Style Demonstration   Barriers to Learning No barriers   ORTHO GOALS   PT Ortho Eval Goals 1;2;3   Ortho Goal 1   Goal Identifier 1   Goal Description pt will be able to descend stairs with normal mechanics   Target Date 10/04/21   Ortho Goal 2   Goal Identifier 2   Goal Description pt will be able to run 5 miles without shin pain   Target Date 10/04/21   Total Evaluation Time   PT Viviane, Low Complexity Minutes ([id number]) 30

## 2021-08-30 ENCOUNTER — HOSPITAL ENCOUNTER (OUTPATIENT)
Dept: PHYSICAL THERAPY | Facility: CLINIC | Age: 31
Setting detail: THERAPIES SERIES
End: 2021-08-30
Attending: PEDIATRICS
Payer: OTHER GOVERNMENT

## 2021-08-30 PROCEDURE — 97112 NEUROMUSCULAR REEDUCATION: CPT | Mod: GP | Performed by: PHYSICAL THERAPIST

## 2021-10-18 ENCOUNTER — IMMUNIZATION (OUTPATIENT)
Dept: FAMILY MEDICINE | Facility: CLINIC | Age: 31
End: 2021-10-18
Payer: OTHER GOVERNMENT

## 2021-10-18 DIAGNOSIS — Z23 NEED FOR PROPHYLACTIC VACCINATION AND INOCULATION AGAINST INFLUENZA: Primary | ICD-10-CM

## 2021-10-18 PROCEDURE — 90686 IIV4 VACC NO PRSV 0.5 ML IM: CPT

## 2021-10-18 PROCEDURE — 99207 PR NO CHARGE NURSE ONLY: CPT

## 2021-10-18 PROCEDURE — 90471 IMMUNIZATION ADMIN: CPT

## 2021-10-27 ENCOUNTER — TELEPHONE (OUTPATIENT)
Dept: FAMILY MEDICINE | Facility: CLINIC | Age: 31
End: 2021-10-27

## 2021-10-27 DIAGNOSIS — Z30.2 ENCOUNTER FOR STERILIZATION: Primary | ICD-10-CM

## 2021-10-28 ENCOUNTER — ANCILLARY PROCEDURE (OUTPATIENT)
Dept: ULTRASOUND IMAGING | Facility: CLINIC | Age: 31
End: 2021-10-28
Attending: NURSE PRACTITIONER
Payer: OTHER GOVERNMENT

## 2021-10-28 ENCOUNTER — OFFICE VISIT (OUTPATIENT)
Dept: FAMILY MEDICINE | Facility: CLINIC | Age: 31
End: 2021-10-28
Payer: OTHER GOVERNMENT

## 2021-10-28 VITALS
HEIGHT: 69 IN | HEART RATE: 82 BPM | WEIGHT: 188 LBS | DIASTOLIC BLOOD PRESSURE: 74 MMHG | SYSTOLIC BLOOD PRESSURE: 116 MMHG | TEMPERATURE: 98 F | BODY MASS INDEX: 27.85 KG/M2 | RESPIRATION RATE: 16 BRPM

## 2021-10-28 DIAGNOSIS — R07.0 THROAT PAIN: ICD-10-CM

## 2021-10-28 DIAGNOSIS — B07.0 PLANTAR WARTS: ICD-10-CM

## 2021-10-28 DIAGNOSIS — R07.0 THROAT PAIN: Primary | ICD-10-CM

## 2021-10-28 PROCEDURE — 76536 US EXAM OF HEAD AND NECK: CPT | Performed by: RADIOLOGY

## 2021-10-28 PROCEDURE — 99214 OFFICE O/P EST MOD 30 MIN: CPT | Performed by: NURSE PRACTITIONER

## 2021-10-28 RX ORDER — OXYCODONE AND ACETAMINOPHEN 5; 325 MG/1; MG/1
1 TABLET ORAL EVERY 6 HOURS PRN
Qty: 12 TABLET | Refills: 0 | Status: SHIPPED | OUTPATIENT
Start: 2021-10-28 | End: 2021-10-31

## 2021-10-28 RX ORDER — DIAZEPAM 10 MG
TABLET ORAL
Qty: 1 TABLET | Refills: 0 | Status: SHIPPED | OUTPATIENT
Start: 2021-10-28 | End: 2022-01-21

## 2021-10-28 ASSESSMENT — MIFFLIN-ST. JEOR: SCORE: 1798.14

## 2021-10-28 NOTE — PATIENT INSTRUCTIONS
Please contact [phone] to schedule your diagnostic procedure      Patient Education     Understanding Plantar Warts    A plantar wart is a small, noncancerous growth on the bottom of the foot. Plantar warts often develop where friction or pressure occurs, such as on the ball of the foot. The word plantar refers to the sole of the foot. Similar warts can occur on other areas of the body such as the hands. Plantar warts are more common in children and young adults.  What causes a plantar wart?  Plantar warts are caused by a virus called human papillomavirus (HPV).  They can be spread by person-to-person contact. Or you can develop one if you walk barefoot on moist surfaces infected with the virus. This might be in a community pool area or locker room. Wearing correct footwear in such places can prevent them.  What are the symptoms of plantar warts?  Plantar warts cause a thick, rough, and often raised patch of skin on the bottom of the foot. The wart may have black dots on it. These dots are dried blood. The wart may cause pain or discomfort. You may also have trouble walking because of the pain.  How are plantar warts treated?  Many plantar warts go away without any treatment. But for those that are painful or that don t go away, several treatments are available. These include:    Salicylic acid. This treatment is put directly on the wart. It may come in the form of a liquid, ointment, pad, or patch. It is available over the counter. Don't use salicylic acid treatment for more than 12 weeks without talking with your healthcare provider.    Cryotherapy. Your healthcare provider puts liquid nitrogen on the wart with a cotton swab or spray. This treatment might be painful.    Medicine. A variety of medicines can be put on or injected into the wart. But research is mixed on how well they work.  There are many folk remedies for plantar warts, but some of these are unproven and can be dangerous. Talk with your  healthcare provider about safe methods to try. Often your healthcare provider will cut away dead parts of the wart before using other treatments.    When should I call my healthcare provider?  Call your healthcare provider if you have plantar warts that become too painful and don't go away on their own or with over-the-counter and at-home treatments.  Santa last reviewed this educational content on 8/1/2018 2000-2021 [employer]. All rights reserved. This information is not intended as a substitute for professional medical care. Always follow your healthcare professional's instructions.

## 2021-10-28 NOTE — PROGRESS NOTES
"      Assessment & Plan     (R07.0) Throat pain  (primary encounter diagnosis)  Comment: Patient complained of inflamed lymph nodes and sore throat does not appear to be strep we will obtain ultrasound  Plan: US Head Neck Soft Tissue      (B07.0) Plantar warts  Comment: Wart removal today due to size and patient's will be running a marathon would like to have it removed sooner than later did apply 1 treatment today but will refer to dermatology per patient's request  Plan: Adult Dermatology Referral           Liquid nitrogen was applied for 5-10 seconds x3  to the skin lesions and the expected blistering or scabbing reaction explained. Do not pick at the areas. Patient reminded to expect hypopigmented scars from the procedure. Return if lesions fail to fully resolve.      Return in about 1 week (around 11/4/2021), or if symptoms worsen or fail to improve.    JEN Gloria CNP  [facility]    Juwan Mccoy is a 31 year old who presents for the following health issues     HPI     Pt presents with a wart on Left foot. Pt states it has been there for 5-6 years. Pt is training for Grandmas marathon and would like removed or referral for removal.       Review of Systems   Constitutional, HEENT, cardiovascular, pulmonary, gi and gu systems are negative, except as otherwise noted.      Objective    /74   Pulse 82   Temp 98  F (36.7  C)   Resp 16   Ht 1.753 m (5' 9\")   Wt 85.3 kg (188 lb)   BMI 27.76 kg/m    Body mass index is 27.76 kg/m .  Physical Exam   GENERAL: healthy, alert and no distress  EYES: Eyes grossly normal to inspection, PERRL and conjunctivae and sclerae normal  HENT: ear canals and TM's normal, nose and mouth without ulcers or lesions  NECK: Enlarged cervical lymph nodes , no asymmetry, masses, or scars and thyroid normal to palpation  RESP: lungs clear to auscultation - no rales, rhonchi or wheezes  CV: regular rate and rhythm, normal S1 S2, no S3 or S4, " no murmur, click or rub, no peripheral edema and peripheral pulses strong  ABDOMEN: soft, nontender, no hepatosplenomegaly, no masses and bowel sounds normal  MS: no gross musculoskeletal defects noted, no edema  SKIN: no suspicious lesions or rashes 4 mm wart to bottom of left foot  NEURO: Normal strength and tone, mentation intact and speech normal  PSYCH: mentation appears normal, affect normal/bright    Results for orders placed or performed in visit on 10/28/21   US Head Neck Soft Tissue     Status: None    Narrative    US HEAD AND NECK SOFT TISSUE   10/28/2021 11:20 AM     HISTORY: Throat pain.    COMPARISON: None available    FINDINGS:  Scattered cervical lymph nodes including 0.7 x 0.5 x 0.2 cm  node in the right zone 5, 0.9 x 0.5 x 0.3 cm left zone 3 and 0.8 x 0.7  x 0.3 cm in left zone 4.      Impression    IMPRESSION: A few mildly prominent cervical lymph nodes,  indeterminate, could be reactive.    JESI ROBERSON MD         SYSTEM ID:  [id number]

## 2021-10-29 ENCOUNTER — MYC MEDICAL ADVICE (OUTPATIENT)
Dept: FAMILY MEDICINE | Facility: CLINIC | Age: 31
End: 2021-10-29

## 2021-10-29 NOTE — TELEPHONE ENCOUNTER
Called patient back to provide reassurance after speaking with Katy Barragan.   Advised to F/U if lymph nodes increase in size, pain, fever or other signs of infection that do not resolve.   Carmen WATKINS RN

## 2021-10-29 NOTE — TELEPHONE ENCOUNTER
"Spoke with patient. He had questions about enlarged lymph nodes seen on ultrasound. He was not even aware he had them.Education reviewed. Patient questioning if that's what the \"dimple\" he has is. Will forward to Katy Barragan.   Carmen WATKINS RN    "

## 2021-11-09 ENCOUNTER — OFFICE VISIT (OUTPATIENT)
Dept: URGENT CARE | Facility: URGENT CARE | Age: 31
End: 2021-11-09
Payer: OTHER GOVERNMENT

## 2021-11-09 VITALS
DIASTOLIC BLOOD PRESSURE: 74 MMHG | RESPIRATION RATE: 16 BRPM | OXYGEN SATURATION: 97 % | TEMPERATURE: 97 F | BODY MASS INDEX: 27.67 KG/M2 | HEART RATE: 66 BPM | SYSTOLIC BLOOD PRESSURE: 126 MMHG | WEIGHT: 187.4 LBS
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
## 2021-11-09 DIAGNOSIS — S30.860A TICK BITE OF BACK, INITIAL ENCOUNTER: Primary | ICD-10-CM

## 2021-11-09 PROCEDURE — 99213 OFFICE O/P EST LOW 20 MIN: CPT | Performed by: PHYSICIAN ASSISTANT

## 2021-11-09 RX ORDER — DOXYCYCLINE 100 MG/1
200 CAPSULE ORAL ONCE
Qty: 2 CAPSULE | Refills: 0 | Status: SHIPPED | OUTPATIENT
Start: 2021-11-09 | End: 2021-11-09

## 2021-11-09 NOTE — PROGRESS NOTES
"  Assessment & Plan     Tick bite of back, initial encounter  Will provide prophylactic treatment with doxycycline 200mg once daily x 1 day. Continue to monitor the area. Follow up as needed.     - doxycycline monohydrate (MONODOX) 100 MG capsule; Take 2 capsules (200 mg) by mouth once for 1 dose             BMI:   Estimated body mass index is 27.67 kg/m  as calculated from the following:    Height as of 10/28/21: 1.753 m (5' 9\").    Weight as of this encounter: 85 kg (187 lb 6.4 oz).           Return in about 1 week (around 11/16/2021), or if symptoms worsen or fail to improve.    Yovana Rueda PA-C  [facility] [place]          Subjective   Chief Complaint   Patient presents with     Insect Bites     Last night found a tick on the right side, was deer hunting, burns, red and a scab.       HPI     Tick bite     Onset of symptoms was 1 day  Course of illness is same.    Severity mild  Current and Associated symptoms: pulled off deer tick on right side/back area, was attached for 1-2 days  Treatment measures tried include None tried.  Predisposing factors include None.              Review of Systems   Constitutional, HEENT, cardiovascular, pulmonary, gi and gu systems are negative, except as otherwise noted.      Objective    /74 (BP Location: Right arm, Patient Position: Sitting, Cuff Size: Adult Regular)   Pulse 66   Temp 97  F (36.1  C) (Tympanic)   Resp 16   Wt 85 kg (187 lb 6.4 oz)   SpO2 97%   BMI 27.67 kg/m    Body mass index is 27.67 kg/m .  Physical Exam  Constitutional:       General: He is not in acute distress.  Skin:     Comments: Right side/back has small area of redness from tick bite. No drainage/discharge or signs of infection. No bullseye rash.    Neurological:      Mental Status: He is alert.                        "

## 2021-12-02 ENCOUNTER — MYC MEDICAL ADVICE (OUTPATIENT)
Dept: FAMILY MEDICINE | Facility: CLINIC | Age: 31
End: 2021-12-02

## 2021-12-02 ENCOUNTER — OFFICE VISIT (OUTPATIENT)
Dept: FAMILY MEDICINE | Facility: CLINIC | Age: 31
End: 2021-12-02
Payer: OTHER GOVERNMENT

## 2021-12-02 VITALS
BODY MASS INDEX: 28.2 KG/M2 | RESPIRATION RATE: 14 BRPM | WEIGHT: 190.4 LBS | SYSTOLIC BLOOD PRESSURE: 144 MMHG | TEMPERATURE: 97.5 F | DIASTOLIC BLOOD PRESSURE: 82 MMHG | HEART RATE: 86 BPM | HEIGHT: 69 IN

## 2021-12-02 DIAGNOSIS — Z30.2 ENCOUNTER FOR STERILIZATION: Primary | ICD-10-CM

## 2021-12-02 PROCEDURE — 55250 REMOVAL OF SPERM DUCT(S): CPT | Performed by: FAMILY MEDICINE

## 2021-12-02 PROCEDURE — 99207 PR DROP WITH A PROCEDURE: CPT | Performed by: FAMILY MEDICINE

## 2021-12-02 RX ORDER — DIAZEPAM 10 MG
TABLET ORAL
Qty: 1 TABLET | Refills: 0 | Status: SHIPPED | OUTPATIENT
Start: 2021-12-02 | End: 2022-01-21

## 2021-12-02 ASSESSMENT — MIFFLIN-ST. JEOR: SCORE: 1809.03

## 2021-12-02 ASSESSMENT — PAIN SCALES - GENERAL: PAINLEVEL: NO PAIN (0)

## 2021-12-02 NOTE — TELEPHONE ENCOUNTER
Call placed to patient regarding my chart message.  Patient is asking what time he needs to take his valium and if he takes the percocet at the same time.    This writer spoke with Dr Ramon, advised patient to take Percocet and valium 30 minutes before 1040 appointment today.  Relayed this information to patient, agrees with plan.  Dionne Rincon RN

## 2021-12-09 ENCOUNTER — TELEPHONE (OUTPATIENT)
Dept: DERMATOLOGY | Facility: CLINIC | Age: 31
End: 2021-12-09
Payer: OTHER GOVERNMENT

## 2021-12-09 NOTE — TELEPHONE ENCOUNTER
Patient has not been seen by Dermatology before, so if he needs a letter stating why he needs to be seen by Dermatology, that needs to come from the referring Provider. I did leave patient a message stating that he needs to contact Provider that referred him to Dermatology clinic.    Mary Alice Oleary RN

## 2021-12-09 NOTE — TELEPHONE ENCOUNTER
[facility] Call Center    Phone Message    May a detailed message be left on voicemail: yes     Reason for Call: Form or Letter   Type or form/letter needing completion: Prior Auth for Appt sent to [facility] for this Appt  Provider: Dr Corrales  Date form needed: ASAP  Once completed: Send directly to [facility]    Pt said he has to have his insurance approve this Appt - said he needs a Prior Auth sent over to [facility] ASA so they can approve him coming to this Dermatology Appt so that they pay for it - he had not scheduled until now because he was waiting for this to happen but decided to schedule now and see if that can speed up the process - his referral was back from 10/28/21 and his Appt is now scheduled with Dr Corrales in [place] [facility] on 02/08/22     Please call Pt back either way if you can help get this process going and speed up this authorization so he can keep this Appt - also once Prior Auth goes through he would like to be on wait list    Thanks for helping him if you can and calling him back either way      Action Taken: Other: [place] [facility]    Travel Screening: Not Applicable

## 2021-12-13 NOTE — PROGRESS NOTES
Darius Bennett is a 31 year old male here for vasectomy.     He has been in previously for vasectomy consult in which we discussed the no-scapel procedure, including risks and benefits, and other options of birth control.  All questions have been answered and the consent form is signed.         Procedure:  He was placed in the supine position on the procedure table.  He was prepped and draped in the usual sterile fashion.  The vas was identified and brought up to the surface.  The skin overlying the vas was anesthetized with lidocaine and further lidocaine injected into the vas sheath.  The no-scaple vas clamp was used to grasp the vas and the dissecting instrument was used to puncture the skin and dissect out the vas free of tissue. A segment of the vas was removed, both ends were closed with intrlumenal electrocautery. .  The distal end was buried under fascia.  The procedure was repeated for the other vas.  There was no complications.  He tolerated the procedure well.  The scrotum was cleaned free of betadine and bacitracin ointment was applied over the skin incisions.         A:  Vasectomy         P:  He was given post-vasectomy instructions, including   containers for post vas specimens.  He should apply ice   and wear support for the next 2-3 days.  He should  abstain from sexual intercourse and any heavy lifting for the next week.  Call or return to clinic for any  problems.  The excised specimens were sent for pathology.  He may use Ibuprofen 800 mg po TID prn for  pain and was given a prescription for percocet.  He is not  considered sterile until follow up vas specimens are free of semen.

## 2021-12-14 NOTE — TELEPHONE ENCOUNTER
I tried to reach patient again, but got voice mail. I left a message stating he should contatc Provider that referred him to Dermatology to fill out whatever form is needed for his  insurance as he has not been seen in Dermatology clinic.    Mary Alice Oleary RN

## 2021-12-20 ENCOUNTER — MYC MEDICAL ADVICE (OUTPATIENT)
Dept: FAMILY MEDICINE | Facility: CLINIC | Age: 31
End: 2021-12-20
Payer: OTHER GOVERNMENT

## 2022-01-18 ENCOUNTER — MYC MEDICAL ADVICE (OUTPATIENT)
Dept: FAMILY MEDICINE | Facility: CLINIC | Age: 32
End: 2022-01-18
Payer: OTHER GOVERNMENT

## 2022-01-18 DIAGNOSIS — R13.10 DYSPHAGIA, UNSPECIFIED TYPE: Primary | ICD-10-CM

## 2022-01-19 NOTE — TELEPHONE ENCOUNTER
Dr Peres  See adicate timeads message. You gave referral to gastro on  7- placed for chronic throat pain. Pt would like new referral as never used previous one.   Does he need new appt?    Walker Dubon RN

## 2022-01-21 ENCOUNTER — VIRTUAL VISIT (OUTPATIENT)
Dept: FAMILY MEDICINE | Facility: CLINIC | Age: 32
End: 2022-01-21
Payer: OTHER GOVERNMENT

## 2022-01-21 DIAGNOSIS — Z20.822 SUSPECTED COVID-19 VIRUS INFECTION: ICD-10-CM

## 2022-01-21 DIAGNOSIS — R07.0 THROAT PAIN: Primary | ICD-10-CM

## 2022-01-21 PROCEDURE — 99213 OFFICE O/P EST LOW 20 MIN: CPT | Mod: 95 | Performed by: PHYSICIAN ASSISTANT

## 2022-01-21 NOTE — PROGRESS NOTES
Carlos Manuel is a 31 year old who is being evaluated via a billable telephone visit.      What phone number would you like to be contacted at? [phone]  How would you like to obtain your AVS? [facility]    Assessment & Plan   Problem List Items Addressed This Visit     None      Visit Diagnoses     Throat pain    -  Primary    Relevant Orders    Adult Gastro Ref - Procedure Only    Symptomatic; Yes; 1/19/2022 COVID-19 Virus (Coronavirus) by PCR    Suspected COVID-19 virus infection        Relevant Orders    Symptomatic; Yes; 1/19/2022 COVID-19 Virus (Coronavirus) by PCR         Impression is likely viral URI including COVID-19. Vaccinated x 2. Will order COVID-19 PCR. Sounds well and non-toxic and I have low suspicion for impending airway obstruction or respiratory distress.  He will push p.o. fluids, use over-the-counter meds for symptoms, and follow-up with us in 2 weeks if not improving or urgent care/the ER if symptoms worsen/change at any time. Recommended COVID-19 vaccination booster.    In addition, patient has had chronic dysphagia with previous thyroid and neck US. Would like EGD. I ordered this. Would consider ENT referral and/or neck CT if EGD unrevealing. Fluticasone and famotidine have been tried with nor improvement.    DDx and Dx discussed with and explained to the pt to their satisfaction.  All questions were answered at this time. Pt expressed understanding of and agreement with this dx, tx, and plan. No further workup warranted and standard medication warnings given. I have given the patient a list of pertinent indications for re-evaluation. Will go to the Emergency Department if symptoms worsen or new concerning symptoms arise. Patient left the call in no apparent distress.     26 minutes spent on the date of the encounter doing chart review, history and exam, documentation and further activities per the note       BMI:   Estimated body mass index is 28.12 kg/m  as calculated from the following:     "Height as of 12/2/21: 1.753 m (5' 9\").    Weight as of 12/2/21: 86.4 kg (190 lb 6.4 oz).     See Patient Instructions    Return in about 1 month (around 2/21/2022) for a recheck of your symptoms if not improving, or call 911/go to an ER anytime if worsening.    JORGE Chavez  [facility] NANDO Horowitz is a 31 year old who presents for the following health issues     HPI     Acute Illness  Acute illness concerns: Eye issue, runny nose, congestion, throat pain, cough, diarrhea, fatigue  Onset/Duration: 2-3 days  Symptoms:  Fever: no  Chills/Sweats: no  Headache (location?): no  Sinus Pressure: no  Conjunctivitis:  YES- floaters have been chronic and he plans to see his eye provider for this. No worsening symptoms.   Ear Pain: no  Rhinorrhea: YES  Congestion: YES  Sore Throat: YES- right side throat pain intermittently for a couple years. Hurts when drinking water and eating. 1 month ago it started up again.   Cough: YES-non-productive  Wheeze: no  Decreased Appetite: no  Nausea: no  Vomiting: no  Diarrhea: YES- IBS, 5-6 years  Dysuria/Freq.: no  Dysuria or Hematuria: no  Fatigue/Achiness: YES- fatigue  Sick/Strep Exposure: no  Therapies tried and outcome: None    Review of Systems   Constitutional, HEENT, cardiovascular, pulmonary, gi and gu systems are negative, except as otherwise noted.      Objective       Vitals:  No vitals were obtained today due to virtual visit.    Physical Exam   healthy, alert and no distress  PSYCH: Alert and oriented times 3; coherent speech, normal   rate and volume, able to articulate logical thoughts, able   to abstract reason, no tangential thoughts, no hallucinations   or delusions  His affect is normal and pleasant  RESP: No cough, no audible wheezing, able to talk in full sentences  Remainder of exam unable to be completed due to telephone visits    EGD and COVID-19 PCR test pending.     Phone call duration: 14 minutes  "

## 2022-01-21 NOTE — PATIENT INSTRUCTIONS
Lurdes Horowitz,    Thank you for allowing [facility] to manage your care.    I am unsure of the cause of your recent  symptoms, but we must assume that this is COVID until proven otherwise. If you develop worsening/changing symptoms at any time, please go to the emergency department for evaluation.    Please call to schedule your endoscopy. If this is reassuring, I would consider CT imaging or evaluation by ENT for your chronic throat pain.    Please allow 1-2 business days for our office to contact you in regards to your laboratory/radiological studies.  If not done so, I encourage you to login into [employer] ([email or web address] to review your results as well.     Drink 8-10 glasses of fluid daily to stay well-hydrated.    For your pain, please use Ibuprofen 400mg four times daily with food. Between ibuprofen doses, you may use Tylenol 650mg.     Max acetaminophen (Tylenol) 4,000mg/24 hours  Max ibuprofen 3,200mg/24 hours    If you have any questions or concerns, please feel free to call us at [phone]    Sincerely,    Nick Richard PA-C    Did you know?      You can schedule a video visit for follow-up appointments as well as future appointments for certain conditions.  Please see the below link.     [email or web address]    If you have not already done so,  I encourage you to sign up for [employer] ([email or web address]).  This will allow you to review your results, securely communicate with a provider, and schedule virtual visits as well.      Patient Education   After Your COVID-19 (Coronavirus) Test  You have been tested for COVID-19 (coronavirus).   If you'll have surgery in the next few days, we'll let you know ahead of time if you have the virus. Please call your surgeon's office with any questions.  For all other patients: Results are usually available in [employer] within 2 to 3 days.   If you do not have a [employer] account, you'll get  "a letter in the mail in about 7 to 10 days.   "[employer]t is often the fastest way to get test results. Please sign up if you do not already have a [employer] account. See the handout Getting COVID-19 Test Results in [employer] for help.  What if my test result is positive?  If your test is positive and you have not viewed your result in [employer], you'll get a phone call with your result. (A positive test means that you have the virus.)     Follow the tips under \"How do I self-isolate?\" below for 10 days (20 days if you have a weak immune system).    You don't need to be retested for COVID-19 before going back to school or work. As long as you're fever-free and feeling better, you can go back to school, work and other activities after waiting the 10 or 20 days.  What if I have questions after I get my results?  If you have questions about your results, please visit our testing website at [email or web address].   After 7 to 10 days, if you have not gotten your results:     Call [phone] ([phone]) and ask to speak with our COVID-19 results team.    If you're being treated at an infusion center: Call your infusion center directly.  What are the symptoms of COVID-19?  Cough, fever and trouble breathing are the most common signs of COVID-19.  Other symptoms can include new headaches, new muscle or body aches, new and unexplained fatigue (feeling very tired), chills, sore throat, congestion (stuffy or runny nose), diarrhea (loose poop), loss of taste or smell, belly pain, and nausea or vomiting (feeling sick to your stomach or throwing up).  You may already have symptoms of COVID-19, or they may show up later.  What should I do if I have symptoms?  If you're having surgery: Call your surgeon's office.  For all other patients: Stay home and away from others (self-isolate) until ...    You've had no fever--and no medicine that reduces fever--for 1 full day (24 hours), AND    Other symptoms " "have gotten better. For example, your cough or breathing has improved, AND    At least 10 days have passed since your symptoms first started.  How do I self-isolate?  1. Stay in your own room, even for meals. Use your own bathroom if you can.  2. Stay away from others in your home. No hugging, kissing or shaking hands. No visitors.  3. Don't go to work, school or anywhere else.  4. Clean \"high touch\" surfaces often (doorknobs, counters, handles). Use household cleaning spray or wipes. You'll find a full list of  on the EPA website: www.epa.gov/pesticide-registration/list-n-disinfectants-use-against-sars-cov-2.  5. Cover your mouth and nose with a mask or other face covering to avoid spreading germs.  6. Wash your hands and face often. Use soap and water.  7. Caregivers in these groups are at risk for severe illness due to COVID-19:  1. People 65 years and older  2. People who live in a nursing home or long-term care facility  3. People with chronic disease (lung, heart, cancer, diabetes, kidney, liver, immunologic)  4. People who have a weakened immune system, including those who:    Are in cancer treatment    Take medicine that weakens the immune system, such as corticosteroids    Had a bone marrow or organ transplant    Have an immune deficiency    Have poorly controlled HIV or AIDS    Are obese (body mass index of 40 or higher)    Smoke regularly  8. Caregivers should wear gloves while washing dishes, handling laundry and cleaning bedrooms and bathrooms.  9. Use caution when washing and drying laundry: Don't shake dirty laundry and use the warmest water setting that you can.  10. For more tips on managing your health at home, go to www.cdc.gov/coronavirus/2019-ncov/downloads/10Things.pdf.  How can I take care of myself at home?  1. Get lots of rest. Drink extra fluids (unless a doctor has told you not to).  2. Take Tylenol (acetaminophen) for fever or pain. If you have liver or kidney problems, ask your " family doctor if it's OK to take Tylenol.   Adults can take either:  ? 650 mg (two 325 mg pills) every 4 to 6 hours, or   ? 1,000 mg (two 500 mg pills) every 8 hours as needed.  ? Note: Don't take more than 3,000 mg in one day. Acetaminophen is found in many medicines (both prescribed and over-the-counter medicines). Read all labels to be sure you don't take too much.   For children, check the Tylenol bottle for the right dose. The dose is based on the child's age or weight.  3. If you have other health problems (like cancer, heart failure, an organ transplant or severe kidney disease): Call your specialty clinic if you don't feel better in the next 2 days.  4. Know when to call 911. Emergency warning signs include:  ? Trouble breathing or shortness of breath  ? Chest pain or pressure that doesn't go away  ? Feeling confused like you haven't felt before, or not being able to wake up  ? Bluish-colored lips or face  5. If your doctor prescribed a blood thinner medicine: Follow their instructions.  Where can I get more information?  1. [facility] - About COVID-19:   [email or web address]  2. CDC - If You're Sick: cdc.gov/coronavirus/2019-ncov/about/steps-when-sick.html  3. CDC - Ending Home Isolation: www.cdc.gov/coronavirus/2019-ncov/hcp/disposition-in-home-patients.html  4. CDC - Caring for Someone: www.cdc.gov/coronavirus/2019-ncov/if-you-are-sick/care-for-someone.html  5. [facility] - Interim Guidance for Hospital Discharge to Home: www.health.[facility].[place]/diseases/coronavirus/hcp/hospdischarge.pdf  6. [facility] clinical trials (COVID-19 research studies): [email or web address].[facility]-clinical-trials  7. Below are the COVID-19 hotlines at the [place] Department of Health ([facility]). Interpreters are available.  ? For health questions: Call 568-019-1432 or 1-789.608.6109 (7 a.m. to 7 p.m.)  ? For questions about schools and childcare: Call 853-475-2328 or 1-632.389.3763 (7 a.m. to 7 p.m.)    For  informational purposes only. Not to replace the advice of your health care provider. Clinically reviewed by Infection Prevention and the [facility] COVID-19 Clinical Team. Copyright   2020 [facility]. All rights reserved. [employer] 844059 - Rev 11/11/20.    We are scheduling all people age 5 and older for COVID-19 vaccines (patients age 5-17 can only receive the Pfizer vaccine).    We are offering third doses of the Pfizer and Moderna COVID-19 vaccines to moderately and severely immunocompromised patients.     [facility] is offering booster doses of Covid-19 vaccination to anyone age 18 and up who got the Jay and Jay vaccine 2 or more months ago or Moderna COVID-19 vaccine or Pfizer COVID-19 vaccine 6 months or more ago.  We are also offering boosters to people age 16-17 who got their second Pfizer vaccine in the [place] 6 months or more ago.    If your initial vaccination was Jay & Jay, we recommend getting a Pfizer or Moderna second dose to maximize immunity.  If you received Moderna or Pfizer, you can schedule either Moderna or Pfizer for your booster dose based on convenience or personal preference other than people younger than 18 years old who can only get pfizer for a booster.      To schedule any COVID-19 vaccination appointment for Moderna or Pfizer, please log in to [employer] using this using this link to see when and where we have openings.  Jay & Jay is only offered at our retail pharmacies (and they also offer Moderna and Pfizer).  To schedule at [facility] please go to [email or web address].    If you have technical difficulty using [employer], call [phone], option 1 for assistance.    More information about vaccine effectiveness at reducing spread of disease, hospitalizations, and death as well as vaccine safety and answers to other questions can be found on our website: [email or web address].

## 2022-01-31 ENCOUNTER — VIRTUAL VISIT (OUTPATIENT)
Dept: FAMILY MEDICINE | Facility: CLINIC | Age: 32
End: 2022-01-31
Payer: OTHER GOVERNMENT

## 2022-01-31 DIAGNOSIS — R07.0 THROAT PAIN: Primary | ICD-10-CM

## 2022-01-31 PROCEDURE — 99213 OFFICE O/P EST LOW 20 MIN: CPT | Mod: 95 | Performed by: PHYSICIAN ASSISTANT

## 2022-01-31 NOTE — PATIENT INSTRUCTIONS
Lurdes Horowitz,    Thank you for allowing [facility] to manage your care.    I am unsure of the cause of your symptoms, but your story is reassuring. We will see what our workup shows.     If you develop worsening/changing symptoms at any time, please call 911 or go to the emergency department for evaluation.    I ordered a CT of your neck to be done ONLY if the neck/throat symptoms return. Please call diagnostic imaging [phone] to schedule your test as needed.    If you have any questions or concerns, please feel free to call us at [phone]    Sincerely,    Nick Richard PA-C    Did you know?      You can schedule a video visit for follow-up appointments as well as future appointments for certain conditions.  Please see the below link.     [email or web address]    If you have not already done so,  I encourage you to sign up for [facility] ([email or web address]).  This will allow you to review your results, securely communicate with a provider, and schedule virtual visits as well.      Patient Education     Self-Care for Sore Throats     Sore throats happen for many reasons, such as colds, allergies, cigarette smoke, air pollution, and infections caused by viruses or bacteria. In any case, your throat becomes red and sore. Your goal for self-care is to reduce your discomfort while giving your throat a chance to heal.  Moisten and soothe your throat  Tips include the following:    Try a sip of water first thing after waking up.    Keep your throat moist by drinking 6 or more glasses of clear liquids every day.    Run a cool-air humidifier in your room overnight.    Stay away from cigarette smoke.     Check the air quality index,if air pollution gives you a sore throat. On high pollution days, try to limit outdoor time.    Suck on throat lozenges, cough drops, hard candy, ice chips, or frozen fruit-juice bars. Use the sugar-free versions if your diet or  medical condition requires them.  Gargle to ease irritation  Gargling every hour or 2 can ease irritation. Try gargling with 1 of these solutions:    1/4 teaspoon of salt in 1/2 cup of warm water    An over-the-counter anesthetic gargle  Use medicine for more relief  Over-the-counter medicine can reduce sore throat symptoms. Ask your pharmacist if you have questions about which medicine to use. To prevent possible medicine interactions, let the pharmacist know what medicines you take. To decrease symptoms:    Ease pain with anesthetic sprays. Aspirin or an aspirin substitute also helps. Remember, never give aspirin to anyone 18 or younger. Don't take aspirin if you are already taking blood thinners.     For sore throats caused by allergies, try antihistamines to block the allergic reaction.  Unless a sore throat is caused by a bacterial infection, antibiotics won t help you.  Prevent future sore throats  Prevention tips include:    Stop smoking or reduce contact with secondhand smoke. Smoke irritates the tender throat lining.    Limit contact with pets and with allergy-causing substances, such as pollen and mold.    Wash your hands often when you re around someone with a sore throat or cold. This will keep viruses or bacteria from spreading.    Limit outdoor time when air pollution is bad.    Don t strain your vocal cords.  When to call your healthcare provider  Contact your healthcare provider if you have:    Fever of 100.4 F (38.0 C) or higher, or as directed by your healthcare provider    White spots on the throat    Great Trouble swallowing    A skin rash    Recent exposure to someone else with strep bacteria    Severe hoarseness and swollen glands in the neck or jaw  Call 911  Call 911 if any of the following occur:    Trouble breathing or catching your breath    Drooling and problems swallowing    Wheezing    Unable to talk    Feeling dizzy or faint    Feeling of doom  [place] last reviewed this educational  "content on 9/1/2019 2000-2021 [employer]. All rights reserved. This information is not intended as a substitute for professional medical care. Always follow your healthcare professional's instructions.           Patient Education   Discharge Instructions for COVID-19 Patients  You have--or may have--COVID-19. Please follow the instructions listed below.   If you have a weakened immune system, discuss with your doctor any other actions you need to take.  How can I protect others?  If you have symptoms (fever, cough, body aches or trouble breathing):    Stay home and away from others (self-isolate) until:  ? Your other symptoms have resolved (gotten better). And   ? You've had no fever--and no medicine that reduces fever--for 1 full day (24 hours). And   ? At least 10 days have passed since your symptoms started. (You may need to wait 20 days. Follow the advice of your care team.)  If you don't show symptoms, but testing showed that you have COVID-19:    Stay home and away from others (self-isolate) until at least 10 days have passed since the date of your first positive COVID-19 test.  During this time    Stay in your own room, even for meals. Use your own bathroom if you can.    Stay away from others in your home. No hugging, kissing or shaking hands. No visitors.    Don't go to work, school or anywhere else.    Clean \"high touch\" surfaces often (doorknobs, counters, handles). Use household cleaning spray or wipes.    You'll find a full list of  on the EPA website: www.epa.gov/pesticide-registration/list-n-disinfectants-use-against-sars-cov-2.    Cover your mouth and nose with a mask or other face covering to avoid spreading germs.    Wash your hands and face often. Use soap and water.    Caregivers in these groups are at risk for severe illness due to COVID-19:  ? People 65 years and older  ? People who live in a nursing home or long-term care facility  ? People with chronic disease (lung, " heart, cancer, diabetes, kidney, liver, immunologic)  ? People who have a weakened immune system, including those who:    Are in cancer treatment    Take medicine that weakens the immune system, such as corticosteroids    Had a bone marrow or organ transplant    Have an immune deficiency    Have poorly controlled HIV or AIDS    Are obese (body mass index of 40 or higher)    Smoke regularly    Caregivers should wear gloves while washing dishes, handling laundry and cleaning bedrooms and bathrooms.    Use caution when washing and drying laundry: Don't shake dirty laundry and use the warmest water setting that you can.    For more tips on managing your health at home, go to www.cdc.gov/coronavirus/2019-ncov/downloads/10Things.pdf.  How can I take care of myself at home?  1. Get lots of rest. Drink extra fluids (unless a doctor has told you not to).  2. Take Tylenol (acetaminophen) for fever or pain. If you have liver or kidney problems, ask your family doctor if it's okay to take Tylenol.   Adults can take either:   ? 650 mg (two 325 mg pills) every 4 to 6 hours, or   ? 1,000 mg (two 500 mg pills) every 8 hours as needed.  ? Note: Don't take more than 3,000 mg in one day. Acetaminophen is found in many medicines (both prescribed and over-the-counter medicines). Read all labels to be sure you don't take too much.   For children, check the Tylenol bottle for the right dose. The dose is based on the child's age or weight.  3. If you have other health problems (like cancer, heart failure, an organ transplant or severe kidney disease): Call your specialty clinic if you don't feel better in the next 2 days.  4. Know when to call 911. Emergency warning signs include:  ? Trouble breathing or shortness of breath  ? Pain or pressure in the chest that doesn't go away  ? Feeling confused like you haven't felt before, or not being able to wake up  ? Bluish-colored lips or face  5. Your doctor may have prescribed a blood thinner  medicine. Follow their instructions.  Where can I get more information?    [facility] - About COVID-19:   [email or web address]    CDC - What to Do If You're Sick: www.cdc.gov/coronavirus/2019-ncov/about/steps-when-sick.html    CDC - Ending Home Isolation: www.cdc.gov/coronavirus/2019-ncov/hcp/disposition-in-home-patients.html    CDC - Caring for Someone: www.cdc.gov/coronavirus/2019-ncov/if-you-are-sick/care-for-someone.html    [facility] - Interim Guidance for Hospital Discharge to Home: www.health.[facility].mn./diseases/coronavirus/hcp/hospdischarge.pdf    Below are the COVID-19 hotlines at the [place] Department of Health ([facility]). Interpreters are available.  ? For health questions: Call 348-972-1895 or 1-681.131.5468 (7 a.m. to 7 p.m.)  ? For questions about schools and childcare: Call 138-723-3497 or 1-344.737.9633 (7 a.m. to 7 p.m.)    For informational purposes only. Not to replace the advice of your health care provider. Clinically reviewed by Dr. Saturnino Burnett.   Copyright   2020 [facility]. All rights reserved. [employer] 584024 - REV 01/05/21.       We are scheduling all people age 5 and older for COVID-19 vaccines (patients age 5-17 can only receive the Pfizer vaccine).    We are offering third doses of the Pfizer and Moderna COVID-19 vaccines to moderately and severely immunocompromised patients.     [facility] is offering booster doses of Covid-19 vaccination to anyone age 18 and up who got the Jay and Jay vaccine 2 or more months ago or Moderna COVID-19 vaccine or Pfizer COVID-19 vaccine 6 months or more ago.  We are also offering boosters to people age 16-17 who got their second Pfizer vaccine in the [place] 6 months or more ago.    If your initial vaccination was Jay & Jay, we recommend getting a Pfizer or Moderna second dose to maximize immunity.  If you received Moderna or Pfizer, you can schedule either Moderna or Pfizer for your booster dose based  on convenience or personal preference other than people younger than 18 years old who can only get pfizer for a booster.      To schedule any COVID-19 vaccination appointment for Moderna or Pfizer, please log in to [facility] using this using this link to see when and where we have openings.  Jay & Jay is only offered at our retail pharmacies (and they also offer Moderna and Pfizer).  To schedule at [facility] please go to [email or web address].    If you have technical difficulty using [employer], call [phone], option 1 for assistance.    More information about vaccine effectiveness at reducing spread of disease, hospitalizations, and death as well as vaccine safety and answers to other questions can be found on our website: [email or web address].

## 2022-01-31 NOTE — PROGRESS NOTES
"Carlos Manuel is a 31 year old who is being evaluated via a billable telephone visit.      What phone number would you like to be contacted at? [phone]  [facility]    Assessment & Plan   Problem List Items Addressed This Visit     None      Visit Diagnoses     Throat pain    -  Primary    Relevant Orders    CT Soft Tissue Neck w Contrast         Patient recently had a sore throat and other symptoms due to COVID-19. Sounds well and non-toxic and I have low suspicion for impending airway obstruction or respiratory distress. He reports intermittent chronic throat and neck discomfort and I have ordered a soft tissue neck CT should his symptoms return. Reassured.    DDx and Dx discussed with and explained to the pt to their satisfaction.  All questions were answered at this time. Pt expressed understanding of and agreement with this dx, tx, and plan. No further workup warranted and standard medication warnings given. I have given the patient a list of pertinent indications for re-evaluation. Will go to the Emergency Department if symptoms worsen or new concerning symptoms arise. Patient left the callin no apparent distress.     17 minutes spent on the date of the encounter doing chart review, history and exam, documentation and further activities per the note     BMI:   Estimated body mass index is 28.12 kg/m  as calculated from the following:    Height as of 12/2/21: 1.753 m (5' 9\").    Weight as of 12/2/21: 86.4 kg (190 lb 6.4 oz).     See Patient Instructions    Return for a recheck as needed, or call 911/go to an ER anytime if worsening.    JORGE Chavez  [facility] NANDO Horowitz is a 31 year old who presents for the following health issues     HPI     Sore throat a few days before a Covid test came back Pos on 1/24/22. His symptoms have resolved. Unvaccinated. Has had intermittent right sided throat pain for years and is hoping to have this worked " up, though he is asymptomatic currently. We had discussed endoscopy previously.     Review of Systems   Constitutional, HEENT, cardiovascular, pulmonary, gi and gu systems are negative, except as otherwise noted.      Objective           Vitals:  No vitals were obtained today due to virtual visit.    Physical Exam   healthy, alert and no distress  PSYCH: Alert and oriented times 3; coherent speech, normal   rate and volume, able to articulate logical thoughts, able   to abstract reason, no tangential thoughts, no hallucinations   or delusions  His affect is normal and pleasant  RESP: No cough, no audible wheezing, able to talk in full sentences  Remainder of exam unable to be completed due to telephone visits    CT soft tissue neck with contrast pending.     Phone call duration: 12 minutes

## 2022-02-04 ENCOUNTER — TELEPHONE (OUTPATIENT)
Dept: FAMILY MEDICINE | Facility: CLINIC | Age: 32
End: 2022-02-04
Payer: OTHER GOVERNMENT

## 2022-02-08 ENCOUNTER — OFFICE VISIT (OUTPATIENT)
Dept: DERMATOLOGY | Facility: CLINIC | Age: 32
End: 2022-02-08
Attending: NURSE PRACTITIONER
Payer: OTHER GOVERNMENT

## 2022-02-08 ENCOUNTER — TELEPHONE (OUTPATIENT)
Dept: FAMILY MEDICINE | Facility: CLINIC | Age: 32
End: 2022-02-08

## 2022-02-08 VITALS — SYSTOLIC BLOOD PRESSURE: 115 MMHG | OXYGEN SATURATION: 97 % | HEART RATE: 75 BPM | DIASTOLIC BLOOD PRESSURE: 76 MMHG

## 2022-02-08 DIAGNOSIS — B07.0 PLANTAR WART: ICD-10-CM

## 2022-02-08 DIAGNOSIS — L81.4 LENTIGO: Primary | ICD-10-CM

## 2022-02-08 PROCEDURE — 17110 DESTRUCTION B9 LES UP TO 14: CPT | Performed by: DERMATOLOGY

## 2022-02-08 PROCEDURE — 99212 OFFICE O/P EST SF 10 MIN: CPT | Mod: 25 | Performed by: DERMATOLOGY

## 2022-02-08 NOTE — NURSING NOTE
"Initial /76   Pulse 75   SpO2 97%  Estimated body mass index is 28.12 kg/m  as calculated from the following:    Height as of 12/2/21: 1.753 m (5' 9\").    Weight as of 12/2/21: 86.4 kg (190 lb 6.4 oz). .    "

## 2022-02-08 NOTE — LETTER
2/8/2022         RE: Darius Bennett  [address] [address]        Dear Colleague,    Thank you for referring your patient, Darius Bennett, to the [facility]. Please see a copy of my visit note below.    Darius Bennett is an extremely pleasant 31 year old year old male patient here today for spot on left foot.   .   Patient states this has been present for a while.  Patient reports the following symptoms:  Growing.  .  Patient reports the following previous treatments OTC.  These treatments did not work.  Patient reports the following modifying factors none.  Associated symptoms: none.  Patient has no other skin complaints today.  Remainder of the HPI, Meds, PMH, Allergies, FH, and SH was reviewed in chart.      Past Medical History:   Diagnosis Date     Cluster headaches      IBS (irritable bowel syndrome)        Past Surgical History:   Procedure Laterality Date     Compartment release Bilateral 2007    He reports compartment syndrome in both lower legs for which he had surgery        Family History   Problem Relation Age of Onset     No Known Problems Mother      Hypertension Father      Diabetes Paternal Grandmother      Kidney Cancer Paternal Grandfather      No Known Problems Brother      No Known Problems Sister        Social History     Socioeconomic History     Marital status:      Spouse name: Not on file     Number of children: Not on file     Years of education: Not on file     Highest education level: Not on file   Occupational History     Not on file   Tobacco Use     Smoking status: Never Smoker     Smokeless tobacco: Former User   Vaping Use     Vaping Use: Never used   Substance and Sexual Activity     Alcohol use: Yes     Drug use: No     Sexual activity: Yes     Partners: Female   Other Topics Concern     Parent/sibling w/ CABG, MI or angioplasty before 65F 55M? Not Asked   Social History Narrative     Not on file     Social  Determinants of Health     Financial Resource Strain: Not on file   Food Insecurity: Not on file   Transportation Needs: Not on file   Physical Activity: Not on file   Stress: Not on file   Social Connections: Not on file   Intimate Partner Violence: Not on file   Housing Stability: Not on file       Outpatient Encounter Medications as of 2/8/2022   Medication Sig Dispense Refill     Multiple Vitamins-Minerals (MULTIVITAMIN ADULT PO)        Omega-3 Fatty Acids (FISH OIL OMEGA-3 PO)        No facility-administered encounter medications on file as of 2/8/2022.             O:   NAD, WDWN, Alert & Oriented, Mood & Affect wnl, Vitals stable   Here today alone   /76   Pulse 75   SpO2 97%    General appearance normal   Vitals stable   Alert, oriented and in no acute distress     L plantar foot verucous plaque  Brown macule son legs  Eyes: Conjunctivae/lids:Normal     ENT: Lips, buccal mucosa, tongue: normal    MSK:Normal    Cardiovascular: peripheral edema none    Pulm: Breathing Normal    Neuro/Psych: Orientation:Alert and Orientedx3 ; Mood/Affect:normal       A/P:  1. Lentigo  2. Wart  Bleo, excision, cryo, mediplast discussed with patient   Lesion paired down today   LN2:  Treated with LN2 for 5s for 1-2 cycles. Warned risks of blistering, pain, pigment change, scarring, and incomplete resolution.  Advised patient to return if lesions do not completely resolve.  Wound care sheet given.  mediplast tape at home  Return to clinic 6 weeks  It was a pleasure speaking to Darius Bennett today.  Previous clinic notes and pertinent laboratory tests were reviewed prior to Darius Bennett's visit.        Again, thank you for allowing me to participate in the care of your patient.        Sincerely,        Chris Corrales MD

## 2022-02-08 NOTE — PROGRESS NOTES
Darius Bennett is an extremely pleasant 31 year old year old male patient here today for spot on left foot.   .   Patient states this has been present for a while.  Patient reports the following symptoms:  Growing.  .  Patient reports the following previous treatments OTC.  These treatments did not work.  Patient reports the following modifying factors none.  Associated symptoms: none.  Patient has no other skin complaints today.  Remainder of the HPI, Meds, PMH, Allergies, FH, and SH was reviewed in chart.      Past Medical History:   Diagnosis Date     Cluster headaches      IBS (irritable bowel syndrome)        Past Surgical History:   Procedure Laterality Date     Compartment release Bilateral 2007    He reports compartment syndrome in both lower legs for which he had surgery        Family History   Problem Relation Age of Onset     No Known Problems Mother      Hypertension Father      Diabetes Paternal Grandmother      Kidney Cancer Paternal Grandfather      No Known Problems Brother      No Known Problems Sister        Social History     Socioeconomic History     Marital status:      Spouse name: Not on file     Number of children: Not on file     Years of education: Not on file     Highest education level: Not on file   Occupational History     Not on file   Tobacco Use     Smoking status: Never Smoker     Smokeless tobacco: Former User   Vaping Use     Vaping Use: Never used   Substance and Sexual Activity     Alcohol use: Yes     Drug use: No     Sexual activity: Yes     Partners: Female   Other Topics Concern     Parent/sibling w/ CABG, MI or angioplasty before 65F 55M? Not Asked   Social History Narrative     Not on file     Social Determinants of Health     Financial Resource Strain: Not on file   Food Insecurity: Not on file   Transportation Needs: Not on file   Physical Activity: Not on file   Stress: Not on file   Social Connections: Not on file   Intimate Partner Violence: Not on file    Housing Stability: Not on file       Outpatient Encounter Medications as of 2/8/2022   Medication Sig Dispense Refill     Multiple Vitamins-Minerals (MULTIVITAMIN ADULT PO)        Omega-3 Fatty Acids (FISH OIL OMEGA-3 PO)        No facility-administered encounter medications on file as of 2/8/2022.             O:   NAD, WDWN, Alert & Oriented, Mood & Affect wnl, Vitals stable   Here today alone   /76   Pulse 75   SpO2 97%    General appearance normal   Vitals stable   Alert, oriented and in no acute distress     L plantar foot verucous plaque  Brown macule son legs  Eyes: Conjunctivae/lids:Normal     ENT: Lips, buccal mucosa, tongue: normal    MSK:Normal    Cardiovascular: peripheral edema none    Pulm: Breathing Normal    Neuro/Psych: Orientation:Alert and Orientedx3 ; Mood/Affect:normal       A/P:  1. Lentigo  2. Wart  Bleo, excision, cryo, mediplast discussed with patient   Lesion paired down today   LN2:  Treated with LN2 for 5s for 1-2 cycles. Warned risks of blistering, pain, pigment change, scarring, and incomplete resolution.  Advised patient to return if lesions do not completely resolve.  Wound care sheet given.  mediplast tape at home  Return to clinic 6 weeks  It was a pleasure speaking to Darius Bennett today.  Previous clinic notes and pertinent laboratory tests were reviewed prior to Darius Bennett's visit.

## 2022-02-08 NOTE — PATIENT INSTRUCTIONS
Warts are caused by the Human Papilloma Virus.They are commonly spread by direct contact or autoinoculation. That mean if the wart is picked at it could spread to another area of skin.   In children without treatment 50% of warts will disappear spontaneous in 6 months, 90% are gone in 2 years. In adults they can last for a longer period of time, but they can resolve without treatment.   No method of treatment is better than the other. You just have to be consistent with your treatments and return every 4-6 weeks.   In between treatments you should use either salicylic acid or mediplast tape:    Mediplast 40% tape: Cut to size of wart, leave tape on for 1 week, (Put duct tape over it to secure it). In 1 week, pare skin down with a pumice stone/razor and repeat. You want to pare down until you see some pinpoint bleeding. Do this for 6-8 weeks, and then follow up in clinic.

## 2022-02-08 NOTE — TELEPHONE ENCOUNTER
Noting- I have called patient numerus time to have him call once he schedules his EGD. Patient has never called me back. Patient has not scheduled his EGD. Patient has  insurance  And needs a referral sent before procedure is done.  No referral has been sent at this time reason I do not have the information I need to completed referral.     Dariana ADAMS-referral rep

## 2022-02-10 ENCOUNTER — LAB (OUTPATIENT)
Dept: LAB | Facility: CLINIC | Age: 32
End: 2022-02-10
Payer: OTHER GOVERNMENT

## 2022-02-10 DIAGNOSIS — Z30.2 ENCOUNTER FOR STERILIZATION: ICD-10-CM

## 2022-02-10 LAB
SEMEN ANALYSIS P VAS PNL: NORMAL
SPERM MOTILE SMN QL MICRO: NORMAL

## 2022-02-10 PROCEDURE — 89321 SEMEN ANAL SPERM DETECTION: CPT

## 2022-02-13 ENCOUNTER — HEALTH MAINTENANCE LETTER (OUTPATIENT)
Age: 32
End: 2022-02-13

## 2022-04-13 ENCOUNTER — VIRTUAL VISIT (OUTPATIENT)
Dept: FAMILY MEDICINE | Facility: CLINIC | Age: 32
End: 2022-04-13
Payer: OTHER GOVERNMENT

## 2022-04-13 DIAGNOSIS — S86.899D MEDIAL TIBIAL STRESS SYNDROME, UNSPECIFIED LATERALITY, SUBSEQUENT ENCOUNTER: Primary | ICD-10-CM

## 2022-04-13 PROCEDURE — 99441 PR PHYSICIAN TELEPHONE EVALUATION 5-10 MIN: CPT | Mod: TEL | Performed by: NURSE PRACTITIONER

## 2022-04-13 NOTE — PROGRESS NOTES
Carlos Manuel is a 32 year old who is being evaluated via a billable telephone visit.      What phone number would you like to be contacted at? [phone]  How would you like to obtain your AVS? Solitario    Assessment & Plan     Medial tibial stress syndrome, unspecified laterality, subsequent encounter  Recurrent shin pain with running.  Recommend PT with running program (formerly through the [employer]).  - Physical Therapy Referral; Future      The risks, benefits and treatment options of prescribed medications or other treatments have been discussed with the patient. The patient verbalized their understanding and should call or follow up if no improvement or if they develop further problems.    JEN Massey CNP  [facility]          Subjective   Carlos Manuel is a 32 year old who presents for the following health issues     HPI     Chief Complaint   Patient presents with     Patient Request     Patient requesting referral to physical therapy for shin splints  Patient also asking about MRI test results from last year.         Concern - shin splints/ pain  Asking for referral to PT  Discuss MRI results-  Never really had a discussion about this from last year.  Onset:  Chronic - years  Description: patient c/o chronic shin splints related to running  Pain in lower legs.  Patient is in the  and currently training for grandma's marathon.  Intensity: moderate  Progression of Symptoms:  same  Accompanying Signs & Symptoms:   Previous history of similar problem: yes  Orthopedic  MRI  Precipitating factors:        Worsened by: exercise- doesn't hurt while running , pain is afterwards.  Alleviating factors:        Improved by: nothing  Therapies tried and outcome: rest from running ,  Pain always comes back after starting exercise again  Ice  Ibuprofen PRN        Review of Systems   Constitutional, HEENT, cardiovascular, pulmonary, gi and gu systems are negative, except as otherwise noted.    "   Objective    Vitals - Patient Reported  Weight (Patient Reported): 83.5 kg (184 lb)  Height (Patient Reported): 175.3 cm (5' 9\")  BMI (Based on Pt Reported Ht/Wt): 27.17  Pain Score: Moderate Pain (4)      Vitals:  No vitals were obtained today due to virtual visit.    Physical Exam   PSYCH: Alert and oriented times 3; coherent speech, normal   rate and volume, able to articulate logical thoughts, able   to abstract reason, no tangential thoughts, no hallucinations   or delusions    RESP: No cough, no audible wheezing, able to talk in full sentences  Remainder of exam unable to be completed due to telephone visits                Phone call duration: 5 minutes  "

## 2022-04-19 ENCOUNTER — TELEPHONE (OUTPATIENT)
Dept: FAMILY MEDICINE | Facility: CLINIC | Age: 32
End: 2022-04-19
Payer: OTHER GOVERNMENT

## 2022-04-19 NOTE — TELEPHONE ENCOUNTER
Talked with patient. Sent to [facility] [place] for approval.     Dariana ADAMS-referral rep

## 2022-04-19 NOTE — TELEPHONE ENCOUNTER
Reason for Call: Request for insurance referral:    Order or referral being requested: Insurance Referral for , patient is in the [employer] for left leg medial tibial stress symptom    Date needed: as soon as possible    Has the patient been seen by the PCP for this problem? YES    Additional comments: Patient saw RENE Sawyer on 4/13/22    Phone number Patient can be reached at:  Home number on file [phone] (home)    Best Time:  Any    Can we leave a detailed message on this number?  YES    Call taken on 4/19/2022 at 10:35 AM by Patricia Osuna

## 2022-04-25 ENCOUNTER — THERAPY VISIT (OUTPATIENT)
Dept: PHYSICAL THERAPY | Facility: CLINIC | Age: 32
End: 2022-04-25
Attending: NURSE PRACTITIONER
Payer: OTHER GOVERNMENT

## 2022-04-25 DIAGNOSIS — S86.899D MEDIAL TIBIAL STRESS SYNDROME, UNSPECIFIED LATERALITY, SUBSEQUENT ENCOUNTER: ICD-10-CM

## 2022-04-25 PROCEDURE — 97530 THERAPEUTIC ACTIVITIES: CPT | Mod: GP | Performed by: PHYSICAL THERAPIST

## 2022-04-25 PROCEDURE — 97035 APP MDLTY 1+ULTRASOUND EA 15: CPT | Mod: GP | Performed by: PHYSICAL THERAPIST

## 2022-04-25 PROCEDURE — 97161 PT EVAL LOW COMPLEX 20 MIN: CPT | Mod: GP | Performed by: PHYSICAL THERAPIST

## 2022-04-25 NOTE — PROGRESS NOTES
Physical Therapy Initial Evaluation  Physical Therapy Initial Examination/Evaluation    April 25, 2022    Darius Bennett  is a 32 year old  male referred to physical therapy by Dr. Charlene Sawyer, cNP for treatment of R medial tibial stress syndrome.      DOI/onset MD order 4/13/2022    Mechanism of injury Chronic progressing with training.  Has had them since 2008/2009 playing soccer.  In the last 5 years it has become more significant.  Last year he did PT to get through the marathon and then 2 two months off running to rest.        Currently training for [employer].  Started running 12/2021, very slowly and building per program. 5' for 2 bouts.      Worked up to 27 miles in a week.  Longest training run is 18 miles a couple of weeks and the pain returned.  Then took 2 weeks off due to the pain and did active rest.  Ran 10 miles yesterday and felt ok, then this morning he felt it walking again.   This weekend supposed to run 1/2 marathon.    Ran 3x/week, for the marathon      DOS none  Prior treatment strength, orthotics, shoes.   Effect of prior treatment ineffective.  PT did some chiropractic care     Has manipulated melinda, step width, and foot strike.  Reports he used to over stride and caught that about with itb     Uses a massage gun, lacrosse ball for TPR and foot ball massage    Chief Complaint:   Shin splits.     Pain location: L medial tibia  Quality: push off reproduces pain through medial tibia  Constant/Intermittent: intermiattent  Time of day: with activity  Symptoms have worsened since onset.    Current pain 5/10.  Pain at best 0/10.  Pain at worst 7-8/10.    Symptoms aggravated by activity.    Symptoms improved with rest.     Social history:  Pt is , 2 kids- 2 and 4 years old.  Pt bikes, swims and lift weights.    Occupation:  for [employer].  Job duties:  Sitting standing.    Patient having difficulty with ADLs: walking, jumping.    Patient's goals are improve  pain.    Patient reports general health as excellent.  Related medical history none.    Surgical History:  Compartment syndrome surgery L 2007.    Imaging: MRI with no findings last year.    Medications:  none.       Outcome measure:   LEFS  Return to MD:  As needed.      Clinical Impression: Carlos Manuel presents to [facility] with primary complaint of left medial tibial stress syndrome.  Per clinical examination, pt with noted tenderness through the medial shaft of the left leg; proximal strength and proprioceptive deficits.  Nonpainful muscle testing today.  Contacted referring provider to reach out if additional evaluation is necessary to ensure safety prior to running a race this weekend.  Pt with positive response to mechanical pulsed US and taping today.  To continue per plan of care outlined below.     HPI                    Objective:  - Calcaneal valgus, incr pain through lateral foot   - Slight gastroc astrophy L     Balance:   EC R 22 sec, L 9 sec     Squat:   Good form B; SLS Squat loss of femoral control R>L with 2/5 poor form, L 4/5 poor form       System    Ankle/Foot Evaluation  ROM:  AROM is normal.  AROM:    Dorsiflexion:  Left:   8  Right:   5  Plantarflexion:  Left:  85    Right:  83            Strength:    Dorsiflexion:  Left: 5/5     Pain:   Right: 5/5   Pain:  Plantarflexion: Left: 5/5   Pain:   Right: 5/5  Pain:          Anterior Tibialis:Left: 5/5  Pain:  Right: 5/5  Pain:  Posterior Tibialis: Left: 5/5  Pain:  Right: 5/5  Pain:  Peroneals: Left: 5/5  Pain:  Right: 5/5  Pain:  Extensor Digitorum: Left: 5/5  Pain:Right: 5/5  Pain:      LIGAMENT TESTING: not assessed                PALPATION: Palpation of ankle: + Medial tibial left distally       Right ankle tenderness not present at:  medial malleolus or lateral malleolus  EDEMA: normal          MOBILITY TESTING: not assessed                                                     Hip Evaluation    Hip Strength:    Flexion:   Left: 5/5   Pain:  Right:  5/5   Pain:                      Abduction:  Left: 4/5     Pain:Right: 5/5    Pain:    Internal Rotation:  Left: 5/5    Pain:Right: 5/5   Pain:  External Rotation:  Left: 5/5   Pain:  Right: 5/5   Pain:  Knee Flexion:  Left: 5/5   Pain:Right: 5/5   Pain:  Knee Extension:  Left: 5/5   Pain:Right: 5/5    Pain:                       General     ROS    Assessment/Plan:    Patient is a 32 year old male with left side ankle complaints.    Patient has the following significant findings with corresponding treatment plan.                Diagnosis 1:  L medial tibial stress syndrome    Pain -  hot/cold therapy, US, manual therapy, STS, self management, education and home program  Decreased ROM/flexibility - manual therapy, therapeutic exercise and home program  Decreased strength - therapeutic exercise and therapeutic activities  Impaired muscle performance - neuro re-education  Decreased function - therapeutic activities  Impaired posture - neuro re-education    Therapy Evaluation Codes:   1) History comprised of:   Personal factors that impact the plan of care:      Overall behavior pattern, Past/current experiences, Profession and Time since onset of symptoms.    Comorbidity factors that impact the plan of care are:      None.     Medications impacting care: None.  2) Examination of Body Systems comprised of:   Body structures and functions that impact the plan of care:      Ankle.   Activity limitations that impact the plan of care are:      Running, Sports, Standing and Walking.  3) Clinical presentation characteristics are:   Evolving/Changing.  4) Decision-Making    Low complexity using standardized patient assessment instrument and/or measureable assessment of functional outcome.  Cumulative Therapy Evaluation is: Low complexity.    Previous and current functional limitations:  (See Goal Flow Sheet for this information)    Short term and Long term goals: (See Goal Flow Sheet for this information)     Communication  ability:  Patient appears to be able to clearly communicate and understand verbal and written communication and follow directions correctly.  Treatment Explanation - The following has been discussed with the patient:   RX ordered/plan of care  Anticipated outcomes  Possible risks and side effects  This patient would benefit from PT intervention to resume normal activities.   Rehab potential is good.    Frequency:  1-2 X week, once daily  Duration:  for 1 months tapering to 2 X a month over 1 months  Discharge Plan:  Achieve all LTG.  Independent in home treatment program.  Reach maximal therapeutic benefit.    Please refer to the daily flowsheet for treatment today, total treatment time and time spent performing 1:1 timed codes.

## 2022-04-28 ENCOUNTER — THERAPY VISIT (OUTPATIENT)
Dept: PHYSICAL THERAPY | Facility: CLINIC | Age: 32
End: 2022-04-28
Payer: OTHER GOVERNMENT

## 2022-04-28 DIAGNOSIS — S86.899D MEDIAL TIBIAL STRESS SYNDROME, UNSPECIFIED LATERALITY, SUBSEQUENT ENCOUNTER: Primary | ICD-10-CM

## 2022-04-28 PROCEDURE — 97140 MANUAL THERAPY 1/> REGIONS: CPT | Mod: GP | Performed by: PHYSICAL THERAPIST

## 2022-04-28 PROCEDURE — 97530 THERAPEUTIC ACTIVITIES: CPT | Mod: GP | Performed by: PHYSICAL THERAPIST

## 2022-04-28 PROCEDURE — 97035 APP MDLTY 1+ULTRASOUND EA 15: CPT | Mod: GP | Performed by: PHYSICAL THERAPIST

## 2022-05-02 ENCOUNTER — TELEPHONE (OUTPATIENT)
Dept: FAMILY MEDICINE | Facility: CLINIC | Age: 32
End: 2022-05-02
Payer: OTHER GOVERNMENT

## 2022-05-02 DIAGNOSIS — S86.892D LEFT MEDIAL TIBIAL STRESS SYNDROME, SUBSEQUENT ENCOUNTER: Primary | ICD-10-CM

## 2022-05-02 NOTE — TELEPHONE ENCOUNTER
Please call patient and discussed therapy recs below.  If he is feeling better from his physical therapy, ultrasound therapy and taping, we do not need to proceed with any new imaging.  If he is not feeling any pain relief, we can repeat MRI per PT recommendations.    Charlene Sawyer, CNP

## 2022-05-02 NOTE — TELEPHONE ENCOUNTER
----- Message from Ivette Paul, PT sent at 4/25/2022 10:08 AM CDT -----  Regarding: PT follow up  Charlene,     I wanted to touch based about Carlos Manuel.  He presented today with significant tenderness through the medial tibial zone consistent with MTSS.  He did rest for a couple of weeks and has a 1/2 marathon planned this weekend.  Yesterday he did try a 10 mile and felt ok following but again had pain when walking today.  Prior to telling him it was ok to run, I wanted to touch base with you to see if you think he needs any updated imaging of the lower leg to rule out stress fracture.  I did not charge him for a running eval, as I did not feel it was appropriate given his level of pain- potential compensations, etc.      In clinic, I did do a pulsed ultrasound and taping which really seemed to help with the pain.   If it was bone stress I do not think we would have that response.  Either way, I wanted to touch base with you to see your thoughts and further give him the clearance to run this weekend.  Also I did touch base with him about nutrition to ensure he is getting proper Ca+/Vit D.  He may reach out to you or discuss with the pharmacist.      Please let me know and I will reach out to the patient to further discuss our plan.       Thanks for the referral.      Loreta

## 2022-05-03 ENCOUNTER — THERAPY VISIT (OUTPATIENT)
Dept: PHYSICAL THERAPY | Facility: CLINIC | Age: 32
End: 2022-05-03
Attending: NURSE PRACTITIONER
Payer: OTHER GOVERNMENT

## 2022-05-03 DIAGNOSIS — S86.899D MEDIAL TIBIAL STRESS SYNDROME, UNSPECIFIED LATERALITY, SUBSEQUENT ENCOUNTER: Primary | ICD-10-CM

## 2022-05-03 PROCEDURE — 97530 THERAPEUTIC ACTIVITIES: CPT | Mod: GP | Performed by: PHYSICAL THERAPIST

## 2022-05-03 PROCEDURE — 97112 NEUROMUSCULAR REEDUCATION: CPT | Mod: GP | Performed by: PHYSICAL THERAPIST

## 2022-05-03 PROCEDURE — 97035 APP MDLTY 1+ULTRASOUND EA 15: CPT | Mod: GP | Performed by: PHYSICAL THERAPIST

## 2022-05-03 PROCEDURE — 97110 THERAPEUTIC EXERCISES: CPT | Mod: GP | Performed by: PHYSICAL THERAPIST

## 2022-05-03 NOTE — PROGRESS NOTES
Running Video Gait Analysis  Date: 5/3/2022   Injury: MTSS   Speed(MPH): 5.5 mph Pace(min/mile): runs approximately 9 min mile pace     Shoe: stability  Orthotics: Yes, currently   Shoe Type: Stability  Current Melinda: 172  Pain: 3/10   Recommended Melinda  Did not change today to hold and reassess stride next.  Pt reports he had already been working to manipulate his stride and melinda   Posterior/Back View:    Pronation    Left: Controlled.  Right:Controlled.  Toe-Out   Left: Normal. Right: Normal.  Push Off   Left: Lateral.  Right: Normal.  Step Width   Left: Normal. Right: Normal.  Heel Whip   Left: ER (Medial). Right: ER (Medial).  Shoulder/arm- holding arms and trunk tight with running.  Cuing to brush hands on the pockets with the stride to relax the upper trunk and facilitate greater rotation    Left: Abducted. Right: Normal.  Pelvis   Left: Level.  Right: Level.    Trunk Rotation: Limited. Vertical Displacement: Moderate.  Incr with left push off     Lateral/Side View:    Strike Pattern     Left: Heel and Midfoot. Right: Heel and Midfoot.    Knee Flexion     Left: Normal. Right: Normal.    Hip Extension     Left: Normal. Right: Normal.    Tibial Inclincation     Left: Mild. Right: Normal.    Trunk: Normal.       Anterior/Front View:    Hip    Knee     Left: Neutral. Right: Valgus, very slight.      Overall Impression/Summary: Pt with decr stride length and rapid push off L LE due to pain today.  Limited trunk mobility and arm swing as potential pain guarding strategies.  Suspect antalgic gait patterning and did not modify melinda and recommended rest this week.  To reassess gait next week with further discussion on proper training and marathon expectations.       Currently on week 10/16- concerned he will not make it through the marathon.  Recommended further follow up with Sports Medicine Physician as the problem has persisted despite efforts of rest, training modification and orthotic/footwear changes.   >10 years on/off pain.

## 2022-05-03 NOTE — TELEPHONE ENCOUNTER
"  S-(situation): Patient having pain with PT recs and questioning what the pain is from     B-(background): Patient stated having trouble with leg pain for many of years. Has a Dx of medial tibial stress syndrome , unspecified laterality. 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     A-(assessment): Called and discussed therapy Rec's with the patient He stated \"He doesn't feel like it is a fracture and he ran a half marathon which was 13 miles on Saturday.  (4/30/22)\".  He stated that his pain right after the marathon was 7/10, He stated he sat and relaxed for a bit after the marathon, and he could still walk around and tolerate it a bit after marathon. One he was home he would put the affected leg in a 5 gallon bucket of ice water for 15 minute increments and would elevate mark placed and Ice wrap on Saturday also stated he was displaying limping due to pain. continued ice wrap and rest on Sunday. His pain level Sunday patient stated went down to 4-5/10, then Monday still icing when he could in the evening pain level was 3-4/10 he stated today his pain was 3/10.  Patient stated that running is something that he enjoys and he has always had issues with pain in his left leg.   .     R-(recommendations): Patient stated he would complete and MRI if ou want him to, He really enjoys running and wants to keep working toward goals of [employer]'s marathon.     "

## 2022-05-05 NOTE — TELEPHONE ENCOUNTER
Lauren Chiu contacted Darius on 05/05/22 and left a message. If patient calls back please contact care team.

## 2022-05-05 NOTE — TELEPHONE ENCOUNTER
Charlene  Pt also requesting referral to ortho. Suggested waiting til after mri but pt states needs PA from [facility] for referral so wants to get it started.       Walker Dubon RN

## 2022-05-05 NOTE — TELEPHONE ENCOUNTER
As his pain seems significant and he continues to want to train for this marathon, my recommendation is that we take physical therapist advice and repeat imaging.  MRI ordered  Charlene Sawyer CNP

## 2022-05-10 ENCOUNTER — THERAPY VISIT (OUTPATIENT)
Dept: PHYSICAL THERAPY | Facility: CLINIC | Age: 32
End: 2022-05-10
Attending: NURSE PRACTITIONER
Payer: OTHER GOVERNMENT

## 2022-05-10 DIAGNOSIS — S86.899D MEDIAL TIBIAL STRESS SYNDROME, UNSPECIFIED LATERALITY, SUBSEQUENT ENCOUNTER: Primary | ICD-10-CM

## 2022-05-10 PROCEDURE — 97110 THERAPEUTIC EXERCISES: CPT | Mod: GP | Performed by: PHYSICAL THERAPIST

## 2022-05-10 PROCEDURE — 97530 THERAPEUTIC ACTIVITIES: CPT | Mod: GP | Performed by: PHYSICAL THERAPIST

## 2022-05-18 ENCOUNTER — THERAPY VISIT (OUTPATIENT)
Dept: PHYSICAL THERAPY | Facility: CLINIC | Age: 32
End: 2022-05-18
Payer: OTHER GOVERNMENT

## 2022-05-18 DIAGNOSIS — S86.899D MEDIAL TIBIAL STRESS SYNDROME, UNSPECIFIED LATERALITY, SUBSEQUENT ENCOUNTER: Primary | ICD-10-CM

## 2022-05-18 PROCEDURE — 97110 THERAPEUTIC EXERCISES: CPT | Mod: GP | Performed by: PHYSICAL THERAPIST

## 2022-05-18 PROCEDURE — 97140 MANUAL THERAPY 1/> REGIONS: CPT | Mod: GP | Performed by: PHYSICAL THERAPIST

## 2022-05-18 NOTE — PROGRESS NOTES
Subjective:  HPI  Physical Exam                    Objective:  System    Physical Exam    General     ROS    Assessment/Plan:    PROGRESS  REPORT    Progress reporting period is from 5/18/2022.       SUBJECTIVE  I am now doing every other day with the running and strengthening with a full day off between.  I am kind of just making it by.  I feel like the 180 is ok for short runs but with the longer slow runs, it is too fast.  176-178 for longer runs.  Unfortunately my right knee started bothering me for the first time in a long time this weekend.   S    trengthening doing 2 running days; Tues/Thursday.  Using the gel food packs every 45 min with running and electrolytes with longer weekend runs.    25 miles last week.  Progressive mile increase to include: 22/25/28/31/20/32.2 over 6 weeks.      Current pain- did not ask a level today, reports soreness.   After the 14 mile this weekend ok in a couple of days vs last run it was an entire week.      Changes in function:  None  Adverse reaction to treatment or activity: treatment - reports slight improvement with US, taping.  Planned MRI on Friday.      OBJECTIVE  Changes noted in objective findings:  The objective findings below are from DOS 5/18/2022.  Objective:   Palpation: Mod-max TTP through the left medial tibia, distally.  No pain through the distal anterior bone region.      Flexibility: + hip flexor and quad tightness B.  + R>L Maco test with ITB prominencel.    MMT: hip ER 4/5 B; knee flexion R 4/5, L 5-/5.     Other: FRS/ERS screen Improved extension L L5 in sitting.  R decr ext L2 with hypomobility L sacral base.    Maintain melinda 178 as tolerated.  Add anterior stretching and use of a foam roller to the warm up.      Plan: Continue training MRI on Friday- to see sports medicine relative to any further medical intervention required.      Goal [place] [address]      ASSESSMENT/PLAN  Updated problem list and treatment plan: Diagnosis 1:  L shin splints;  MTSS    Pain -  hot/cold therapy, US, manual therapy, self management, education and home program  Decreased strength - therapeutic exercise and therapeutic activities  Impaired balance - neuro re-education and therapeutic activities  Impaired muscle performance - neuro re-education  Decreased function - therapeutic activities  STG/LTGs have been met or progress has been made towards goals:  Yes (See Goal flow sheet completed today.)  Assessment of Progress: The patient's condition has potential to improve.  Self Management Plans:  Patient has been instructed in a home treatment program.  Patient  has been instructed in self management of symptoms.  I have re-evaluated this patient and find that the nature, scope, duration and intensity of the therapy is appropriate for the medical condition of the patient.  Darius continues to require the following intervention to meet STG and LTG's:  PT    Recommendations:  This patient would benefit from continued therapy.     Frequency:  1-2 X week, once daily  Duration:  for 1 months tapering to 2 X a month over 2 months        Please refer to the daily flowsheet for treatment today, total treatment time and time spent performing 1:1 timed codes.

## 2022-05-20 ENCOUNTER — ANCILLARY PROCEDURE (OUTPATIENT)
Dept: MRI IMAGING | Facility: CLINIC | Age: 32
End: 2022-05-20
Attending: NURSE PRACTITIONER
Payer: OTHER GOVERNMENT

## 2022-05-20 DIAGNOSIS — S86.892D LEFT MEDIAL TIBIAL STRESS SYNDROME, SUBSEQUENT ENCOUNTER: ICD-10-CM

## 2022-05-20 PROCEDURE — 73718 MRI LOWER EXTREMITY W/O DYE: CPT | Mod: LT | Performed by: RADIOLOGY

## 2022-05-31 ENCOUNTER — THERAPY VISIT (OUTPATIENT)
Dept: PHYSICAL THERAPY | Facility: CLINIC | Age: 32
End: 2022-05-31
Payer: OTHER GOVERNMENT

## 2022-05-31 DIAGNOSIS — S86.899D MEDIAL TIBIAL STRESS SYNDROME, UNSPECIFIED LATERALITY, SUBSEQUENT ENCOUNTER: Primary | ICD-10-CM

## 2022-05-31 PROCEDURE — 20560 NDL INSJ W/O NJX 1 OR 2 MUSC: CPT | Performed by: PHYSICAL THERAPIST

## 2022-05-31 PROCEDURE — 97140 MANUAL THERAPY 1/> REGIONS: CPT | Mod: GP | Performed by: PHYSICAL THERAPIST

## 2022-07-13 ENCOUNTER — LAB (OUTPATIENT)
Dept: LAB | Facility: CLINIC | Age: 32
End: 2022-07-13
Payer: OTHER GOVERNMENT

## 2022-07-13 DIAGNOSIS — Z30.2 ENCOUNTER FOR STERILIZATION: ICD-10-CM

## 2022-07-13 LAB — SEMEN ANALYSIS P VAS PNL: NORMAL

## 2022-07-13 PROCEDURE — 89321 SEMEN ANAL SPERM DETECTION: CPT

## 2022-09-09 NOTE — PROGRESS NOTES
Subjective:  HPI  Physical Exam                    Objective:  System    Physical Exam    General     ROS    Assessment/Plan:    DISCHARGE REPORT    Progress reporting period is from 4/25/22 to 5/31/22.       SUBJECTIVE   Subjective: Pt reports needling did help with his discomfort on his 18 mile run.    Changes in function:  unknown  Adverse reaction to treatment or activity: None    OBJECTIVE  Changes noted in objective findings:  Patient has failed to return to therapy so current objective findings are unknown.        ASSESSMENT/PLAN  Updated problem list and treatment plan: Diagnosis 1:  Medial tibial stress suyndrome  STG/LTGs have been met or progress has been made towards goals:  Yes (See Goal flow sheet completed today.) and unknown  Assessment of Progress: The patient has not returned to therapy. Current status is unknown.  Self Management Plans:  Patient has been instructed in a home treatment program.  Patient  has been instructed in self management of symptoms.  Darius continues to require the following intervention to meet STG and LTG's:  unknown    Recommendations:  Patient did not return to therapy, current status is unknown    Please refer to the daily flowsheet for treatment today, total treatment time and time spent performing 1:1 timed codes.

## 2022-10-16 ENCOUNTER — HEALTH MAINTENANCE LETTER (OUTPATIENT)
Age: 32
End: 2022-10-16

## 2022-11-29 ENCOUNTER — VIRTUAL VISIT (OUTPATIENT)
Dept: FAMILY MEDICINE | Facility: CLINIC | Age: 32
End: 2022-11-29
Payer: OTHER GOVERNMENT

## 2022-11-29 DIAGNOSIS — J01.90 ACUTE SINUSITIS, RECURRENCE NOT SPECIFIED, UNSPECIFIED LOCATION: Primary | ICD-10-CM

## 2022-11-29 PROCEDURE — 99442 PR PHYSICIAN TELEPHONE EVALUATION 11-20 MIN: CPT | Mod: TEL | Performed by: INTERNAL MEDICINE

## 2022-11-29 RX ORDER — PREDNISONE 20 MG/1
40 TABLET ORAL DAILY
Qty: 14 TABLET | Refills: 2 | Status: SHIPPED | OUTPATIENT
Start: 2022-11-30 | End: 2022-12-05

## 2022-11-29 NOTE — PROGRESS NOTES
Carlos Manuel is a 32 year old who is being evaluated via a billable telephone visit.      What phone number would you like to be contacted at? [phone]  How would you like to obtain your AVS? [facility]           Assessment and Plan:     Acute sinusitis, recurrence not specified, unspecified location  - REVIEW OF HEALTH MAINTENANCE PROTOCOL ORDERS  - predniSONE (DELTASONE) 20 MG tablet; Take 2 tablets (40 mg) by mouth daily for 5 days Then take 1 tablet daily x 3 days and then 1/2 tablet once a day x 2 days.  - amoxicillin-clavulanate (AUGMENTIN) 875-125 MG tablet; Take 1 tablet by mouth 2 times daily           Interval History:     Chief complaint: Sinus pressure, left side  Onset/Duration: A week  Symptoms:  Fever: No  Chills/Sweats: No  Headache (location?): YES- left temple  Sinus Pressure: YES  Conjunctivitis:  No  Ear Pain: no  Rhinorrhea: YES  Congestion: YES  Sore Throat: No  Cough: no  Wheeze: No  Decreased Appetite: YES  Nausea: No  Vomiting: No  Diarrhea: No  Dysuria/Freq.: No  Dysuria or Hematuria: No  Fatigue/Achiness: No  Sick/Strep Exposure: YES- wife, sinus infection  Therapies tried and outcome: Sudafed and Ibuprofen              Significant Problems:   Patient Active Problem List   Diagnosis     Cluster headaches     IBS (irritable bowel syndrome)     Numbness and tingling of both feet     Left leg pain     Medial tibial stress syndrome, unspecified laterality, subsequent encounter              Review of Systems:   CONSTITUTIONAL: NEGATIVE for fever, chills, change in weight  INTEGUMENTARY/SKIN: NEGATIVE for worrisome rashes, moles or lesions  EYES: NEGATIVE for vision changes or irritation  ENT/MOUTH: NEGATIVE for ear, mouth and throat problems  RESP:NEGATIVE for hemoptysis and wheezing  BREAST: NEGATIVE for masses, tenderness or discharge  CV: NEGATIVE for chest pain, palpitations or peripheral edema  GI: NEGATIVE for nausea, abdominal pain,  heartburn, or change in bowel habits  : NEGATIVE for frequency, dysuria, or hematuria  MUSCULOSKELETAL: NEGATIVE for significant arthralgias or myalgia  NEURO: POSITIVE for history of cluster headaches.  ENDOCRINE: NEGATIVE for temperature intolerance, skin/hair changes  HEME: NEGATIVE for bleeding problems  PSYCHIATRIC: NEGATIVE for changes in mood or affect             Physical Exam:   Vital signs not obtained due to nature of visit.    Remainder of exam not performed due to nature of visit.          Data:   None    Disposition:  Follow-up in two weeks or as needed.      Jaron Barahona MD  Internal Medicine  [facility] Team    Phone call duration: 15 minutes  Start: 2:20 PM  End: 2:35 PM

## 2022-12-21 ENCOUNTER — NURSE TRIAGE (OUTPATIENT)
Dept: NURSING | Facility: CLINIC | Age: 32
End: 2022-12-21

## 2022-12-22 NOTE — TELEPHONE ENCOUNTER
"TRIAGE CALL - Is this a 2nd Level Triage? NO  Nurse Triage SBAR - Patient calling   Situation:  stomach pain and vomiting   Background:    Not takingmedication  Assessment:  At 7:30 PM  Sharp pain before vomiting  Vomiting X 4  No headaches  Feels bloated  Last urination was 7:30 today  Last meal was 2:30 PM  Pain comes in waves  And goes  Up to 10/10     Pain location all over the stomach  Patient is able to speak in full long sentences without getting short of breath.  Patients denies fever, dizziness, or diarrhea.   Measures taken: 8 mg of Zofran - dissolving ones - 3 hrs ago     Recommended Disposition per protocol:  Home Care but  call back  if measures do not work -Patient verbalized understanding and agrees with plan.Amrita Harper RN Nurse Triage Advisor 11:14 PM 12/21/2022    Reason for Disposition    [1] SEVERE vomiting (e.g., 6 or more times/day, vomits everything) BUT [2] hydrated    Additional Information    Negative: Shock suspected (e.g., cold/pale/clammy skin, too weak to stand, low BP, rapid pulse)    Negative: Difficult to awaken or acting confused (e.g., disoriented, slurred speech)    Negative: Sounds like a life-threatening emergency to the triager    Negative: [1] Vomiting AND [2] contains red blood or black (\"coffee ground\") material  (Exception: few red streaks in vomit that only happened once)    Negative: Severe pain in one eye    Negative: Recent head injury (within last 3 days)    Negative: Recent abdominal injury (within last 3 days)    Negative: [1] Insulin-dependent diabetes (Type I) AND [2] glucose > 400 mg/dl (22 mmol/l)    Negative: [1] Vomiting AND [2] hernia is more painful or swollen than usual    Negative: [1] SEVERE vomiting (e.g., 6 or more times/day) AND [2] present > 8 hours (Exception: patient sounds well, is drinking liquids, does not sound dehydrated, and vomiting has lasted less than 24 hours)    Negative: [1] MODERATE vomiting (e.g., 3 - 5 times/day) AND [2] age > 60 " years    Negative: Severe headache (e.g., excruciating) (Exception: similar to previous migraines)    Negative: High-risk adult (e.g., diabetes mellitus, brain tumor, V-P shunt, hernia)    Negative: [1] Drinking very little AND [2] dehydration suspected (e.g., no urine > 12 hours, very dry mouth, very lightheaded)    Negative: Patient sounds very sick or weak to the triager    Negative: [1] Vomiting AND [2] abdomen looks much more swollen than usual    Negative: Taking any of the following medications: digoxin (Lanoxin), lithium, theophylline, phenytoin (Dilantin)    Negative: [1] Fever > 100.0 F (37.8 C) AND [2] weak immune system (e.g., HIV positive, cancer chemo, splenectomy, organ transplant, chronic steroids)    Negative: [1] Fever > 100.0 F (37.8 C) AND [2] bedridden (e.g., nursing home patient, CVA, chronic illness, recovering from surgery)    Negative: [1] Fever > 101 F (38.3 C) AND [2] age > 60 years    Negative: [1] Fever > 103 F (39.4 C) AND [2] not able to get the fever down using Fever Care Advice    Negative: [1] Constant abdominal pain AND [2] present > 2 hours    Negative: [1] Vomiting AND [2] contains bile (green color)    Negative: [1] MILD or MODERATE vomiting AND [2] present > 48 hours (2 days) (Exception: mild vomiting with associated diarrhea)    Negative: Fever present > 3 days (72 hours)    Negative: Vomiting a prescription medication    Negative: [1] MILD vomiting with diarrhea AND [2] present > 5 days    Negative: Substance use (drug use) or unhealthy alcohol use, known or suspected    Protocols used: VOMITING-A-AH

## 2023-03-26 ENCOUNTER — HEALTH MAINTENANCE LETTER (OUTPATIENT)
Age: 33
End: 2023-03-26

## 2023-10-15 ENCOUNTER — NURSE TRIAGE (OUTPATIENT)
Dept: NURSING | Facility: CLINIC | Age: 33
End: 2023-10-15
Payer: OTHER GOVERNMENT

## 2023-10-15 ENCOUNTER — OFFICE VISIT (OUTPATIENT)
Dept: URGENT CARE | Facility: URGENT CARE | Age: 33
End: 2023-10-15
Payer: OTHER GOVERNMENT

## 2023-10-15 VITALS
TEMPERATURE: 97.6 F | OXYGEN SATURATION: 95 % | RESPIRATION RATE: 20 BRPM | HEART RATE: 83 BPM | SYSTOLIC BLOOD PRESSURE: 122 MMHG | DIASTOLIC BLOOD PRESSURE: 76 MMHG

## 2023-10-15 DIAGNOSIS — K52.9 GASTROENTERITIS: Primary | ICD-10-CM

## 2023-10-15 LAB
BASO+EOS+MONOS # BLD AUTO: NORMAL 10*3/UL
BASO+EOS+MONOS NFR BLD AUTO: NORMAL %
BASOPHILS # BLD AUTO: 0 10E3/UL (ref 0–0.2)
BASOPHILS NFR BLD AUTO: 1 %
EOSINOPHIL # BLD AUTO: 0.1 10E3/UL (ref 0–0.7)
EOSINOPHIL NFR BLD AUTO: 2 %
ERYTHROCYTE [DISTWIDTH] IN BLOOD BY AUTOMATED COUNT: 12.1 % (ref 10–15)
HCT VFR BLD AUTO: 40.7 % (ref 40–53)
HGB BLD-MCNC: 14.4 G/DL (ref 13.3–17.7)
IMM GRANULOCYTES # BLD: 0 10E3/UL
IMM GRANULOCYTES NFR BLD: 0 %
LYMPHOCYTES # BLD AUTO: 1.1 10E3/UL (ref 0.8–5.3)
LYMPHOCYTES NFR BLD AUTO: 20 %
MCH RBC QN AUTO: 30.6 PG (ref 26.5–33)
MCHC RBC AUTO-ENTMCNC: 35.4 G/DL (ref 31.5–36.5)
MCV RBC AUTO: 86 FL (ref 78–100)
MONOCYTES # BLD AUTO: 0.5 10E3/UL (ref 0–1.3)
MONOCYTES NFR BLD AUTO: 10 %
NEUTROPHILS # BLD AUTO: 3.7 10E3/UL (ref 1.6–8.3)
NEUTROPHILS NFR BLD AUTO: 68 %
PLATELET # BLD AUTO: 157 10E3/UL (ref 150–450)
RBC # BLD AUTO: 4.71 10E6/UL (ref 4.4–5.9)
WBC # BLD AUTO: 5.4 10E3/UL (ref 4–11)

## 2023-10-15 PROCEDURE — 85025 COMPLETE CBC W/AUTO DIFF WBC: CPT

## 2023-10-15 PROCEDURE — 99213 OFFICE O/P EST LOW 20 MIN: CPT

## 2023-10-15 PROCEDURE — 36415 COLL VENOUS BLD VENIPUNCTURE: CPT

## 2023-10-15 RX ORDER — LOPERAMIDE HCL 2 MG
2 CAPSULE ORAL 4 TIMES DAILY PRN
Qty: 15 CAPSULE | Refills: 0 | Status: SHIPPED | OUTPATIENT
Start: 2023-10-15

## 2023-10-15 RX ORDER — LOPERAMIDE HCL 2 MG
2 CAPSULE ORAL 4 TIMES DAILY PRN
Qty: 15 CAPSULE | Refills: 0 | Status: SHIPPED | OUTPATIENT
Start: 2023-10-15 | End: 2023-10-15

## 2023-10-15 NOTE — TELEPHONE ENCOUNTER
Nurse Triage SBAR    Is this a 2nd Level Triage? NO    Situation: Patient having watery diarrhea since Thursday evening  Consent: not needed    Background: started Thursday evening- missed work on Friday due to feeling ill.     Assessment:   since Thursday night has been having very watery diarrhea  Watery yellow stool- no form to the stool   3 stools today- yesterday had 5-6  Indicates chills- doesn't feel feverish- last chills was Thursday/Friday  Son had diarrhea as well    Has tried taking pepto bismal  Drinking lots fluids  Eating makes his stomach gurgle  One night of nausea on Friday- no present since  Indicates otherwise he was feeling OK   No abdominal  No antibiotic use in the last 2 months   Last urinated about 1-2 hours ago  No travel, no spoiled food, no reptile exposure  No blood in the stool     Protocol Recommended Disposition:       Recommendation: Advised to be seen within the next 24 hours- reviewed additional care advice with patient and he verbalizes understanding. [facility] go to [facility] to be seen.      [facility] today    Does the patient meet one of the following criteria for ADS visit consideration? 16+ years old, with an [facility] PCP     Mable Jernigan RN 2:09 PM 10/15/2023  Reason for Disposition   [1] MODERATE diarrhea (e.g., 4-6 times / day more than normal) AND [2] present > 48 hours (2 days)    Additional Information   Negative: Shock suspected (e.g., cold/pale/clammy skin, too weak to stand, low BP, rapid pulse)   Negative: Difficult to awaken or acting confused (e.g., disoriented, slurred speech)   Negative: Sounds like a life-threatening emergency to the triager   Negative: Vomiting also present and worse than the diarrhea   Negative: [1] Blood in stool AND [2] without diarrhea   Negative: Diarrhea in a cancer patient who is currently (or recently) receiving chemotherapy or radiation therapy, or cancer patient who has metastatic or end-stage cancer and is receiving palliative care    Negative: Diarrhea begins while taking an antibiotic by mouth (oral antibiotic)   Negative: [1] SEVERE abdominal pain (e.g., excruciating) AND [2] present > 1 hour   Negative: [1] SEVERE abdominal pain AND [2] age > 60 years   Negative: [1] Blood in the stool AND [2] moderate or large amount of blood   Negative: Black or tarry bowel movements  (Exception: Chronic-unchanged black-grey BMs AND is taking iron pills or Pepto-Bismol.)   Negative: [1] Drinking very little AND [2] dehydration suspected (e.g., no urine > 12 hours, very dry mouth, very lightheaded)   Negative: Patient sounds very sick or weak to the triager   Negative: [1] SEVERE diarrhea (e.g., 7 or more times / day more than normal) AND [2] age > 60 years   Negative: [1] Constant abdominal pain AND [2] present > 2 hours   Negative: [1] Fever > 103 F (39.4 C) AND [2] not able to get the fever down using Fever Care Advice   Negative: [1] SEVERE diarrhea (e.g., 7 or more times / day more than normal) AND [2] present > 24 hours (1 day)    Protocols used: Diarrhea-A-

## 2023-10-15 NOTE — PROGRESS NOTES
URGENT CARE  Assessment & Plan   Assessment:   Darius Bennett is a 33 year old male who's clinical presentation today is consistent with:   1. Gastroenteritis  - CBC with platelets and differential; Future  - Ova and Parasite Exam Routine; Future  - Enteric Bacteria and Virus Panel by SUY[facility]A Stool; Future  - C. difficile Toxin B PCR with reflex to C. difficile Antigen and Toxins A/B EIA;  - loperamide (IMODIUM) 2 MG capsule;   Plan:  Patient is well appearing, afebrile, has reassuring vital signs, a benign physical exam and there are no suspicious signs of a bacterial cause of the patient's diarrhea (no bloody stools, no fevers, no mucus in the stool); additionally the patient's labs were reassuring; furthermore, the patient does not appear to be hypovolemic, there is no 'profuse' diarrhea noted,  and there is no suspicion for an acute community-acquired diarrheal concern; thus, will treat patient's diarrhea today symptomatically and supportively as a viral gastroenteritis. This will include: fluid repletion w/ a mix of water, sugar and salt (such as Gatorade, electrolyte mixture) and dietary modifications w/ bland foods.   Additionally given patient's symptoms are >72 hours in length (and there is no concern for a bacterial pathology) , educated the patient that it is reasonable to try an anti-motility agent, such as loperamide, cautiously, additionally informed the patient that antisecretory agents such as bismuth subsalicylate, as it helps to decrease fluid to the gastrointestinal tract and has antimicrobial properties specific to gastrointestinal pathogens; side effects of medications reviewed.   Reassurance was provided to the patient that diarrhea usually resolves on its own without treatment and no further workup is necessary, however if the diarrhea becomes persistent, lasting >4-5 days, they should follow up, as it would be reasonable to return to search for an etiology by trying to isolate a treatable  cause or identify a pathogen w/ a stool culture  Additionally we discussed if symptoms do not improve after starting today's treatment (or if symptoms worsen) to follow up in 4-5 days.  No alarm signs or symptoms present   Differential Diagnoses for this patient's chief complaint that I considered include:  Viral vs bacterial cause of diarrhea, parasitic or iatrogentic cause, irritable bowel, inflammatory bowel, ischemic pathology, bowel obstruction, c.diff     Patient is agreeable to treatment plan and state they will follow-up if symptoms do not improve and/or if symptoms worsen   see patient's AVS 'monitor for' section for specific patient instructions given and discussed regarding what to watch for and when to follow up    JEN Hernandez [facility]      ______________________________________________________________________      Subjective     HPI: Darius Bennett is a 33 year old  male who presents today for evaluation the following concerns:   Patient presents today for evaluation of diarrhea for 3 days which started 10/12/23   he denies that  there is discomfort in abdomen, no pain   he states the frequency of the stool  is 2-6 times a day and the stool  characteristics are: liquid yellow, but now changed to brown after taking the pepto .   he denies that  his diarrhea is made worse by movement, pressure, eating, spicy foods, fatty foods, coffee, dairy products, odors, standing, sitting up, recumbency, urination, bowel movement, and nonsteroidal anti-inflammatory drugs},   He denies any anorexia, melena, hematochezia, dysuria, frequency, hematuria, belching, flatus, arthralgias, myalgias, and headache}  he denies: any fevers, blood in the stool or mucus. Denies any severe abdominal pain    he states he is urinating less, but denies decreased skin turgor, orthostatic hypotension other signs of extracellular fluid loss   Regarding potential exposures, he denies:  recent  travel, eating unsafe foods, drinking untreated water, states he doesn't think it is anything he ate, states his son (4yo) also had diarrhea for a few days too, but his son's wasn't as bad and self resolved already   Patient does have a history of IBS in his medical record     Review of Systems:  Pertinent review of systems as reflected in HPI, otherwise negative.     Objective    Physical Exam:  Vitals:    10/15/23 1502   BP: 122/76   BP Location: Right arm   Patient Position: Sitting   Cuff Size: Adult Regular   Pulse: 83   Resp: 20   Temp: 97.6  F (36.4  C)   TempSrc: Tympanic   SpO2: 95%      General:   alert and oriented, no acute distress, non ill-appearing   Vital signs reviewed: afebrile and normotensive    ABD: Bowel sounds hyperactive  in all quadrants   soft,  non-distended   nonTender to palpation    No rebound tenderness appreciated   No tympany, no organomegaly, no masses palpable,   Non-tense, no guarding present, no rigidity present,   No hernias noted, no enlarged inguinal notes, no ascites present     LABS:   Results for orders placed or performed in visit on 10/15/23   CBC with platelets and differential     Status: None ()    Narrative    The following orders were created for panel order CBC with platelets and differential.  Procedure                               Abnormality         Status                     ---------                               -----------         ------                     CBC with platelets and d...[[id number]]                                                   Please view results for these tests on the individual orders.       ______________________________________________________________________    I explained my diagnostic considerations and recommendations to the patient, who voiced understanding and agreement with the treatment plan.   All questions were answered.   We discussed potential side effects, risks and benefits of any prescribed or recommended therapies, as  well as expectations for response to treatments.  Please see AVS for any patient instructions & handouts given.   Patient was advised to contact the Nurse Care Line, their Primary Care provider, Urgent Care, or the Emergency Department if there are new or worsening symptoms, or call 911 for emergencies.

## 2023-10-15 NOTE — PATIENT INSTRUCTIONS
Diagnosis: Most likely a viral gastroenteritis (stomach flu)    Vs a food randell gastritis (food poisoning)   Today:   Labs: no bacterial infectious pathology   Plan:   Get plenty of fluids! fluid replacement with a mix of water, salt, sugar = electrolyte mix    oral rehydration solutions like Gatorade or Pedialyte have the right amount of water, sugar, and salt   Adjust diet: Eat bland and starchy foods such as cereal, crackers, or rice  Remove certain foods that irritate stomach: dairy, high fat or sugar, spicy, cofee   antimotility if >72hr reasonable And if not concerned about bacterial cause (no fevers, mucus, blood) = imodium - The dose of loperamide is two tablets (4 mg) initially, then 2 mg after each unformed stool for ?2 days, with a maximum of 16 mg/day  Also like: antisecretory agents such as Pepto-Bismol, as they help w/ decreasing fluid to gut and also have anti-microbial properties specific to GI pathogens  Pepto-Bismol, 30 mL or two tablets every 30 minutes for eight doses  Monitor for:   diarrhea that gets worse or lasts >72hrs   Develop a new fever  has blood or mucus in the poop, or poop that is black and looks like tar  can't drink fluids, vomiting, for >24hrs,   has severe or worsening belly pain  appears dehydrated; signs include dizziness, drowsiness, dry or sticky mouth, sunken eyes, crying with few or no tears, or peeing less often  Diarrhea:  Diarrhea is poop that's loose, watery, or happens a lot.   Patients who have diarrhea lose a lot of fluid from the body through their poop.   They might also vomit (throw up) or have a fever.   If too much fluid is lost, they can get dehydrated (not have enough water in the body).   Most of the time, diarrhea goes away on its own in a few days.  Diarrhea may be caused by:  Bacterial, viral, or parasitic infections (such as Salmonella , rotavirus, or Giardia)  Food intolerances (such as dairy products)  Medicines (such as antibiotics)  Intestinal illness  (such as Crohn's disease)

## 2024-02-19 NOTE — TELEPHONE ENCOUNTER
--pt has 2 task--    Awaiting for pt to C/B.   PA was started in another encounter and sent to [facility] for sumatriptan intranasal.

## 2024-03-03 ENCOUNTER — TELEPHONE (OUTPATIENT)
Dept: FAMILY MEDICINE | Facility: CLINIC | Age: 34
End: 2024-03-03

## 2024-03-03 ENCOUNTER — OFFICE VISIT (OUTPATIENT)
Dept: URGENT CARE | Facility: URGENT CARE | Age: 34
End: 2024-03-03
Payer: OTHER GOVERNMENT

## 2024-03-03 VITALS
BODY MASS INDEX: 28.65 KG/M2 | DIASTOLIC BLOOD PRESSURE: 78 MMHG | WEIGHT: 194 LBS | OXYGEN SATURATION: 95 % | TEMPERATURE: 98.5 F | SYSTOLIC BLOOD PRESSURE: 121 MMHG | RESPIRATION RATE: 16 BRPM | HEART RATE: 92 BPM

## 2024-03-03 DIAGNOSIS — L50.9 HIVES: Primary | ICD-10-CM

## 2024-03-03 DIAGNOSIS — R50.9 FEVER, UNSPECIFIED: ICD-10-CM

## 2024-03-03 DIAGNOSIS — J06.9 VIRAL UPPER RESPIRATORY TRACT INFECTION: ICD-10-CM

## 2024-03-03 LAB
DEPRECATED S PYO AG THROAT QL EIA: NEGATIVE
FLUAV AG SPEC QL IA: NEGATIVE
FLUBV AG SPEC QL IA: NEGATIVE
GROUP A STREP BY PCR: DETECTED

## 2024-03-03 PROCEDURE — 87804 INFLUENZA ASSAY W/OPTIC: CPT | Performed by: PHYSICIAN ASSISTANT

## 2024-03-03 PROCEDURE — 87651 STREP A DNA AMP PROBE: CPT | Performed by: PHYSICIAN ASSISTANT

## 2024-03-03 PROCEDURE — 99213 OFFICE O/P EST LOW 20 MIN: CPT | Performed by: PHYSICIAN ASSISTANT

## 2024-03-03 RX ORDER — PREDNISONE 20 MG/1
40 TABLET ORAL DAILY
Qty: 10 TABLET | Refills: 0 | Status: SHIPPED | OUTPATIENT
Start: 2024-03-03 | End: 2024-03-03

## 2024-03-03 RX ORDER — PREDNISONE 20 MG/1
40 TABLET ORAL DAILY
Qty: 10 TABLET | Refills: 0 | Status: SHIPPED | OUTPATIENT
Start: 2024-03-03 | End: 2024-03-08

## 2024-03-03 NOTE — TELEPHONE ENCOUNTER
FYI - Status Update    Who is Calling: patient    Update: Pt was seen today at  and prescription was sent to a closed pharmacy. Pt wants prescription sent to [facility] in [place].     [address] [place] [address]      Does caller want a call/response back: Yes     Could we send this information to you in [facility] or would you prefer to receive a phone call?:   Patient would prefer a phone call   Okay to leave a detailed message?: Yes at Cell number on file:    Telephone Information:   Mobile [phone]

## 2024-03-04 ENCOUNTER — TELEPHONE (OUTPATIENT)
Dept: URGENT CARE | Facility: URGENT CARE | Age: 34
End: 2024-03-04

## 2024-03-04 ENCOUNTER — VIRTUAL VISIT (OUTPATIENT)
Dept: FAMILY MEDICINE | Facility: CLINIC | Age: 34
End: 2024-03-04
Payer: OTHER GOVERNMENT

## 2024-03-04 DIAGNOSIS — L50.9 HIVES: ICD-10-CM

## 2024-03-04 DIAGNOSIS — J02.0 STREP THROAT: ICD-10-CM

## 2024-03-04 DIAGNOSIS — Z09 FOLLOW-UP EXAM: Primary | ICD-10-CM

## 2024-03-04 DIAGNOSIS — J02.0 STREP THROAT: Primary | ICD-10-CM

## 2024-03-04 DIAGNOSIS — L29.9 ITCHING: ICD-10-CM

## 2024-03-04 PROCEDURE — 99443 PR PHYSICIAN TELEPHONE EVALUATION 21-30 MIN: CPT | Mod: 93 | Performed by: NURSE PRACTITIONER

## 2024-03-04 RX ORDER — AMOXICILLIN 500 MG/1
500 CAPSULE ORAL 2 TIMES DAILY
Qty: 20 CAPSULE | Refills: 0 | Status: SHIPPED | OUTPATIENT
Start: 2024-03-04

## 2024-03-04 RX ORDER — HYDROXYZINE HYDROCHLORIDE 10 MG/1
10 TABLET, FILM COATED ORAL 3 TIMES DAILY PRN
Qty: 180 TABLET | Refills: 1 | Status: SHIPPED | OUTPATIENT
Start: 2024-03-04

## 2024-03-04 NOTE — PROGRESS NOTES
Carlos Manuel is a 34 year old who is being evaluated via a billable telephone visit.      What phone number would you like to be contacted at? [phone]  How would you like to obtain your AVS? Solitario  Originating Location (pt. Location): Home    Distant Location (provider location):  Off-site    Assessment & Plan     Follow-up exam    - amoxicillin (AMOXIL) 500 MG capsule; Take 1 capsule (500 mg) by mouth 2 times daily With daily yogurt or probiotics.  - hydrOXYzine HCl (ATARAX) 10 MG tablet; Take 1 tablet (10 mg) by mouth 3 times daily as needed for itching    Strep throat    - amoxicillin (AMOXIL) 500 MG capsule; Take 1 capsule (500 mg) by mouth 2 times daily With daily yogurt or probiotics.    Hives    - hydrOXYzine HCl (ATARAX) 10 MG tablet; Take 1 tablet (10 mg) by mouth 3 times daily as needed for itching    Itching    - hydrOXYzine HCl (ATARAX) 10 MG tablet; Take 1 tablet (10 mg) by mouth 3 times daily as needed for itching    Review of external notes as documented elsewhere in note  Ordering of each unique test  Prescription drug management  25 minutes spent by me on the date of the encounter doing chart review, history and exam, documentation and further activities per the note    MED REC REQUIRED  Post Medication Reconciliation Status: discharge medications reconciled and changed, per note/orders    See Patient Instructions    Subjective   Carlos Manuel is a 34 year old, presenting for the following health issues:  Hospital F/U        3/4/2024     8:24 AM   Additional Questions   Roomed by Jesse     Seen at , positive strep test, no antibiotics sent in yet. Will send in antibiotics; discussed take full course with daily yogurt or probiotics.  Discussed more recent cases of chronic hives; unsure if r/t COVID. Continue claritin daily.  Take prednisone in the morning with food. Hydroxyzine for itching, sleep. Review after visit summary tips.  If hives persist may need prednisone taper; allergy referral.  Follow up if  worsening symptoms. Pt in agreement.     HPI     ED/UC Followup:    Facility:  [facility]   Date of visit: 3/3/24  Reason for visit: Hives, tested positive for strep  Current Status: Still has hives and today is day 4           Review of Systems  Constitutional, HEENT, cardiovascular, pulmonary, GI, , musculoskeletal, neuro, skin, endocrine and psych systems are negative, except as otherwise noted.      Objective           Vitals:  No vitals were obtained today due to virtual visit.    Physical Exam   General: Alert and no distress //Respiratory: No audible wheeze, cough, or shortness of breath // Psychiatric:  Appropriate affect, tone, and pace of words      See orders      Phone call duration: 8 minutes  Signed Electronically by: KELIN DOUGLAS

## 2024-03-04 NOTE — TELEPHONE ENCOUNTER
Please inform patient strep PCR is positive. I will send antibiotic to the pharmacy of his choice.

## 2024-06-01 ENCOUNTER — HEALTH MAINTENANCE LETTER (OUTPATIENT)
Age: 34
End: 2024-06-01

## 2024-08-12 NOTE — TELEPHONE ENCOUNTER
Patient Comment: All is going well so would like to up the dose and do another month.    Routing to Yaima VALLECILLO RN

## 2024-12-03 ENCOUNTER — OFFICE VISIT (OUTPATIENT)
Dept: URGENT CARE | Facility: URGENT CARE | Age: 34
End: 2024-12-03
Payer: OTHER GOVERNMENT

## 2024-12-03 VITALS
HEART RATE: 88 BPM | BODY MASS INDEX: 27.91 KG/M2 | SYSTOLIC BLOOD PRESSURE: 128 MMHG | DIASTOLIC BLOOD PRESSURE: 85 MMHG | TEMPERATURE: 99 F | RESPIRATION RATE: 16 BRPM | WEIGHT: 189 LBS | OXYGEN SATURATION: 98 %

## 2024-12-03 DIAGNOSIS — R07.0 THROAT PAIN: Primary | ICD-10-CM

## 2024-12-03 DIAGNOSIS — H10.31 ACUTE CONJUNCTIVITIS OF RIGHT EYE, UNSPECIFIED ACUTE CONJUNCTIVITIS TYPE: ICD-10-CM

## 2024-12-03 LAB
DEPRECATED S PYO AG THROAT QL EIA: NEGATIVE
GROUP A STREP BY PCR: NOT DETECTED

## 2024-12-03 PROCEDURE — 87651 STREP A DNA AMP PROBE: CPT | Performed by: PHYSICIAN ASSISTANT

## 2024-12-03 PROCEDURE — 99213 OFFICE O/P EST LOW 20 MIN: CPT | Performed by: PHYSICIAN ASSISTANT

## 2024-12-03 RX ORDER — POLYMYXIN B SULFATE AND TRIMETHOPRIM 1; 10000 MG/ML; [USP'U]/ML
1 SOLUTION OPHTHALMIC EVERY 4 HOURS
Qty: 10 ML | Refills: 0 | Status: SHIPPED | OUTPATIENT
Start: 2024-12-03 | End: 2024-12-10

## 2024-12-03 NOTE — PROGRESS NOTES
Assessment & Plan     Throat pain  Rapid strep negative, PCR pending. This is likely viral. Continue with supportive care. Get plenty of rest and push fluids. Can use Tylenol and/or ibuprofen as needed for pain and/or fever control. Discussed return to school/work guidelines. Return to clinic if symptoms worsen or do not improve; otherwise follow up as needed     - Streptococcus A Rapid Screen w/Reflex to PCR - Clinic Collect  - Group A Streptococcus PCR Throat Swab    Acute conjunctivitis of right eye, unspecified acute conjunctivitis type  This is likely viral.  Can try over the counter Visine A drops as needed to help with the redness. Wash hands frequently and avoid touching eyes and face. Gave written Rx for polymyxin B/trimethoprim ophthalmic drops to use if purulent drainage develops.      - polymixin b-trimethoprim (POLYTRIM) 43917-5.1 UNIT/ML-% ophthalmic solution; Place 1 drop into the right eye every 4 hours for 7 days.                Return in about 1 week (around 12/10/2024), or if symptoms worsen or fail to improve.              Subjective   Chief Complaint   Patient presents with    Pharyngitis     Exposed to strep over the weekend, sore throat, feeling terrible, headache, cough.       HPI       URI Adult    Onset of symptoms was 2 day(s) ago.  Course of illness is same.    Severity moderate  Current and Associated symptoms: sore throat, headache, cough, runny nose  Treatment measures tried include Tylenol/Ibuprofen, over the counter cough   Predisposing factors include exposure to strep.                      Objective    /85   Pulse 88   Temp 99  F (37.2  C) (Tympanic)   Resp 16   Wt 85.7 kg (189 lb)   SpO2 98%   BMI 27.91 kg/m    Body mass index is 27.91 kg/m .      Physical Exam  Constitutional:       General: He is not in acute distress.     Appearance: He is well-developed.   HENT:      Head: Normocephalic and atraumatic.      Right Ear: Tympanic membrane and ear canal normal.       Left Ear: Tympanic membrane and ear canal normal.      Mouth/Throat:      Pharynx: Posterior oropharyngeal erythema present.   Eyes:      Conjunctiva/sclera:      Right eye: Right conjunctiva is injected. No exudate.     Left eye: Left conjunctiva is not injected. No exudate.  Cardiovascular:      Rate and Rhythm: Normal rate and regular rhythm.   Pulmonary:      Effort: Pulmonary effort is normal.      Breath sounds: Normal breath sounds.   Skin:     General: Skin is warm and dry.      Findings: No rash.   Psychiatric:         Behavior: Behavior normal.            Results for orders placed or performed in visit on 12/03/24 (from the past 24 hours)   Streptococcus A Rapid Screen w/Reflex to PCR - Clinic Collect    Specimen: Throat; Swab   Result Value Ref Range    Group A Strep antigen Negative Negative           Signed Electronically by: Yovana Rueda PA-C

## 2025-03-14 ENCOUNTER — HOSPITAL ENCOUNTER (EMERGENCY)
Facility: CLINIC | Age: 35
Discharge: HOME | End: 2025-03-14
Attending: PHYSICIAN ASSISTANT | Admitting: PHYSICIAN ASSISTANT
Payer: COMMERCIAL

## 2025-03-14 ENCOUNTER — APPOINTMENT (OUTPATIENT)
Dept: GENERAL RADIOLOGY | Facility: CLINIC | Age: 35
End: 2025-03-14
Attending: STUDENT IN AN ORGANIZED HEALTH CARE EDUCATION/TRAINING PROGRAM
Payer: COMMERCIAL

## 2025-03-14 VITALS
SYSTOLIC BLOOD PRESSURE: 147 MMHG | TEMPERATURE: 98.1 F | WEIGHT: 195 LBS | RESPIRATION RATE: 18 BRPM | BODY MASS INDEX: 28.88 KG/M2 | HEIGHT: 69 IN | HEART RATE: 100 BPM | DIASTOLIC BLOOD PRESSURE: 111 MMHG | OXYGEN SATURATION: 98 %

## 2025-03-14 DIAGNOSIS — S99.911A ANKLE INJURY, RIGHT, INITIAL ENCOUNTER: ICD-10-CM

## 2025-03-14 PROCEDURE — 99283 EMERGENCY DEPT VISIT LOW MDM: CPT

## 2025-03-14 PROCEDURE — 73610 X-RAY EXAM OF ANKLE: CPT | Mod: RT

## 2025-03-14 ASSESSMENT — ACTIVITIES OF DAILY LIVING (ADL): ADLS_ACUITY_SCORE: 41

## 2025-03-14 ASSESSMENT — COLUMBIA-SUICIDE SEVERITY RATING SCALE - C-SSRS
1. IN THE PAST MONTH, HAVE YOU WISHED YOU WERE DEAD OR WISHED YOU COULD GO TO SLEEP AND NOT WAKE UP?: NO
2. HAVE YOU ACTUALLY HAD ANY THOUGHTS OF KILLING YOURSELF IN THE PAST MONTH?: NO
6. HAVE YOU EVER DONE ANYTHING, STARTED TO DO ANYTHING, OR PREPARED TO DO ANYTHING TO END YOUR LIFE?: NO

## 2025-03-14 NOTE — Clinical Note
Darius Bennett was seen and treated in our emergency department on 3/14/2025.  He may return to work on 03/17/2025.  Should maintain nonweightbearing status to the right ankle until cleared by orthopedics.     If you have any questions or concerns, please don't hesitate to call.      Artis Alvarez PA-C

## 2025-03-15 NOTE — DISCHARGE INSTRUCTIONS
You have indicated that you prefer and require a referral for orthopedics.  You prefer following up with your primary care doctor and discussing orthopedic referral as you do not live in the [facility].  I think this is reasonable pleat.  Please maintain nonweightbearing and crutches elevate ice and anti-inflammatories for your right ankle.  At this time although you do have an age-indeterminate avulsion fracture we suspect this may be old as you have reported that you have injured this ankle many times in the past.  Out of abundance precaution as you do have some clinical tenderness in this area I do recommend nonweightbearing until cleared by orthopedics.

## 2025-03-15 NOTE — ED PROVIDER NOTES
"  Emergency Department Note      History of Present Illness     Chief Complaint   Ankle Pain      HPI   Darius Bennett is a 35 year old male presenting to the ED for ankle pain. He reports jumping on a trampoline and landing on his right ankle. He heard crack from the ankle and states that the pain is localized in the lateral region. He has injured the same ankle multiple times previously.     Independent Historian   None    Review of External Notes     Past Medical History     Medical History and Problem List   Cluster headaches  IBS  Medial tibial stress syndrome     Medications   No current outpatient medications.     Surgical History   Compartment release, bilateral     Physical Exam     Patient Vitals for the past 24 hrs:   BP Temp Temp src Pulse Resp SpO2 Height Weight   03/14/25 1837 (!) 147/111 98.1  F (36.7  C) Temporal 100 18 98 % 1.753 m (5' 9\") 88.5 kg (195 lb)     Physical Exam  Vitals and nursing note reviewed.   HENT:      Head: Atraumatic.   Eyes:      General: No scleral icterus.     Conjunctiva/sclera: Conjunctivae normal.   Pulmonary:      Effort: Pulmonary effort is normal.   Musculoskeletal:      Left lower leg: No swelling, tenderness or bony tenderness. No edema.      Left ankle: Swelling present. No ecchymosis. Tenderness present over the lateral malleolus, medial malleolus and base of 5th metatarsal. No proximal fibula tenderness. Decreased range of motion. Normal pulse.      Left foot: Normal range of motion and normal capillary refill. No swelling, deformity or tenderness.   Skin:     Capillary Refill: Capillary refill takes less than 2 seconds.      Findings: No bruising.   Neurological:      Mental Status: He is alert and oriented to person, place, and time. Mental status is at baseline.   Psychiatric:         Mood and Affect: Mood normal.         Behavior: Behavior normal.         Thought Content: Thought content normal.         Diagnostics     Lab Results   Labs Ordered and " Resulted from Time of ED Arrival to Time of ED Departure - No data to display    Imaging   Ankle XR, G/E 3 views, right   Final Result   IMPRESSION:       There is a small, mildly displaced, age-indeterminate, possibly acute avulsion fracture along the lateral aspect of the lateral talar process, just inferior to the distal fibula on the AP view. Correlation with point tenderness would be helpful. There is    overlying soft tissue swelling. No dislocation. No joint space narrowing.            Independent Interpretation   X-ray left ankle shows notable for small rounded avulsion fracture possibly acute versus age-indeterminate near the lateral talar process.    ED Course      Medications Administered   Medications - No data to display    Procedures   Procedures     Discussion of Management   None    ED Course   ED Course as of 03/14/25 2337   Fri Mar 14, 2025   1948 I obtained history and examined the patient as noted above.         Additional Documentation  None    Medical Decision Making / Diagnosis     CMS Diagnoses: None    MIPS       None    [place]   Darius Bennett is a 35 year old male presents with right ankle injury.There is no proximal tenderness or pain to suggest tib/fib or knee injury. There is no midfoot, calcaneus or base of 5th MT tenderness to suggest midfoot or calcaneus injury.  X-ray imaging does show potentially age-indeterminate versus acute small avulsion fracture.  He does have tenderness in this area but also has tenderness on the other side of his ankle.  It is notable that the patient does have history of previous ankle injuries so this could be old.  I suspect he likely has a sprain versus new fracture however as a precaution he will be placed in a boot and should maintain nonweightbearing with crutches.  He is not from the [facility] area and because he is in the  he requires referral to orthopedics.  I did offer him [facility] information however the patient prefers  to follow-up with his primary care doctor next week and get a referral to orthopedics which I think is reasonable.  At this time he is neurovascular intact.  Offered small prescription of stronger pain medication such as oxycodone which the patient declined at this time which I think is also reasonable.  He will stick with Motrin and Tylenol ice and elevation.  He understands if he develops any concerning neurovascular compromise or increasing pain or swelling or further concerns regarding the condition of his right ankle to return back to his local emergency department or here.      Disposition   The patient was discharged.     Diagnosis     ICD-10-CM    1. Ankle injury, right, initial encounter  S99.911A Ankle/Foot Bracing Supplies Order Walking Boot; Right; Pneumatic; Short     Crutches Order for DME - ONLY FOR DME           Discharge Medications   Discharge Medication List as of 3/14/2025  8:09 PM            Scribe Disclosure:  I, Taco Franz, am serving as a scribe at 7:56 PM on 3/14/2025 to document services personally performed by Artis Alvarez PA-C based on my observations and the provider's statements to me.        Artis Alvarez PA-C  03/14/25 9836

## 2025-03-17 ENCOUNTER — VIRTUAL VISIT (OUTPATIENT)
Dept: FAMILY MEDICINE | Facility: CLINIC | Age: 35
End: 2025-03-17
Payer: COMMERCIAL

## 2025-03-17 DIAGNOSIS — M25.571 PAIN IN JOINT INVOLVING ANKLE AND FOOT, RIGHT: Primary | ICD-10-CM

## 2025-03-17 PROBLEM — G44.019 EPISODIC CLUSTER HEADACHE, NOT INTRACTABLE: Status: ACTIVE | Noted: 2017-02-14

## 2025-03-17 PROBLEM — M79.605 LEFT LEG PAIN: Status: RESOLVED | Noted: 2021-06-02 | Resolved: 2025-03-17

## 2025-03-17 PROCEDURE — 98005 SYNCH AUDIO-VIDEO EST LOW 20: CPT | Performed by: PHYSICIAN ASSISTANT

## 2025-03-17 NOTE — PROGRESS NOTES
"Carlos Manuel is a 35 year old who is being evaluated via a billable video visit.    How would you like to obtain your AVS? MyChart  If the video visit is dropped, the invitation should be resent by: Text to cell phone: [phone]  Will anyone else be joining your video visit? No      Assessment & Plan     Pain in joint involving ankle and foot, right  ER notes reviewed. Sprain vs fracture. Needs Ortho referral. Placed today. Pain is much improved. No concerning symptoms today. RTC prn for any new, changing or worsening symptoms.    - Orthopedic  Referral; Future      MED REC REQUIRED  Post Medication Reconciliation Status:  Patient was not discharged from an inpatient facility or TCU  BMI  Estimated body mass index is 28.8 kg/m  as calculated from the following:    Height as of 3/14/25: 1.753 m (5' 9\").    Weight as of 3/14/25: 88.5 kg (195 lb).     Subjective   Carlos Manuel is a 35 year old, presenting for the following health issues:  Emergency room follow up         3/17/2025     8:16 AM   Additional Questions   Roomed by Brooke PABLO CMA   Accompanied by Self     HPI     ED/UC Followup:    Facility:  [facility] Emergency Dept  Date of visit: 03/14/2025  Reason for visit: Ankle Pain   Current Status: Pain level is pretty low, Swelling is still significant. Non weight bearing per the emergency room. Looking for a referral to ortho as the emergency dept would not give him one. Has a tentative appointment on Friday.       Review of Systems  See HPI       Objective           Vitals:  No vitals were obtained today due to virtual visit.    Physical Exam   GENERAL: alert and no distress  EYES: Eyes grossly normal to inspection.  No discharge or erythema, or obvious scleral/conjunctival abnormalities.  RESP: No audible wheeze, cough, or visible cyanosis.    SKIN: Visible skin clear. No significant rash, abnormal pigmentation or lesions.  NEURO: Cranial nerves grossly intact.  Mentation and speech appropriate for " age.  PSYCH: Appropriate affect, tone, and pace of words      Video-Visit Details    Type of service:  Video Visit   Originating Location (pt. Location): Home    Distant Location (provider location):  On-site  Platform used for Video Visit: Abby  Signed Electronically by: Chauncey Conte PA-C     Middle cerebral aneurysm

## 2025-03-21 ENCOUNTER — ANCILLARY PROCEDURE (OUTPATIENT)
Dept: GENERAL RADIOLOGY | Facility: CLINIC | Age: 35
End: 2025-03-21
Attending: PEDIATRICS
Payer: COMMERCIAL

## 2025-03-21 PROCEDURE — 73610 X-RAY EXAM OF ANKLE: CPT | Mod: TC | Performed by: RADIOLOGY

## 2025-04-02 ENCOUNTER — VIRTUAL VISIT (OUTPATIENT)
Dept: FAMILY MEDICINE | Facility: CLINIC | Age: 35
End: 2025-04-02
Payer: COMMERCIAL

## 2025-04-02 DIAGNOSIS — R53.83 OTHER FATIGUE: Primary | ICD-10-CM

## 2025-04-02 PROCEDURE — 98005 SYNCH AUDIO-VIDEO EST LOW 20: CPT | Performed by: FAMILY MEDICINE

## 2025-04-02 PROCEDURE — 1126F AMNT PAIN NOTED NONE PRSNT: CPT | Performed by: FAMILY MEDICINE

## 2025-04-02 NOTE — PROGRESS NOTES
"Carlos Manuel is a 35 year old who is being evaluated via a billable video visit.    How would you like to obtain your AVS? MyChart  If the video visit is dropped, the invitation should be resent by: Send to e-mail at: [email or web address].  Logging into My Chart.  Will anyone else be joining your video visit? No      Assessment & Plan   Problem List Items Addressed This Visit    None  Visit Diagnoses       Other fatigue    -  Primary    Relevant Orders    CBC with platelets and differential    Vitamin D Deficiency    Testosterone Free and Total    TSH with free T4 reflex           Patient will be notified of results.       BMI  Estimated body mass index is 28.8 kg/m  as calculated from the following:    Height as of 3/14/25: 1.753 m (5' 9\").    Weight as of 3/14/25: 88.5 kg (195 lb).       FUTURE APPOINTMENTS:       - Follow-up visit as needed.      Subjective   Carlos Manuel is a 35 year old, presenting for the following health issues:    Patient is a 35-year-old male who like his testosterone levels checked.  He reports the last few months to years he has had a decrease in energy level and his libido has decreased also.      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      He also reports that some people who work with him have also had issues with their testosterone levels and he would like to start with this to see if his levels are low.      I did mention that if he does have low testosterone, I  will likely be referring to endocrinology for management of this.      I also added a thyroid and CBC labs to his blood order.  He had no other complaints today and feels well.    Testosterone level check (Wanting to discuss about getting his testosterone levels checked.  There has been a decrease with his energy levels.  Sexual concerns. )        4/2/2025     1:57 PM   Additional Questions   Roomed by Taylor Barone CMA   Accompanied by Self     HPI      Chief Complaint   Patient presents " with    Testosterone level check     Wanting to discuss about getting his testosterone levels checked.  There has been a decrease with his energy levels.  Sexual concerns.              Review of Systems  Constitutional, HEENT, cardiovascular, pulmonary, gi and gu systems are negative, except as otherwise noted.      Objective    Vitals - Patient Reported  Pain Score: No Pain (0)      Physical Exam   GENERAL: alert and no distress  EYES: Eyes grossly normal to inspection.  No discharge or erythema, or obvious scleral/conjunctival abnormalities.  RESP: No audible wheeze, cough, or visible cyanosis.    SKIN: Visible skin clear. No significant rash, abnormal pigmentation or lesions.  NEURO: Cranial nerves grossly intact.  Mentation and speech appropriate for age.  PSYCH: Appropriate affect, tone, and pace of words          Video-Visit Details    Type of service:  Video Visit   Originating Location (pt. Location): Home  Distant Location (provider location):  Off-site  Platform used for Video Visit: Abby  Signed Electronically by: Laquita Peres MD

## 2025-04-08 ENCOUNTER — MYC MEDICAL ADVICE (OUTPATIENT)
Dept: FAMILY MEDICINE | Facility: CLINIC | Age: 35
End: 2025-04-08
Payer: COMMERCIAL

## 2025-04-09 NOTE — TELEPHONE ENCOUNTER
See [employer] messages and pt's response, would like to proceed with referral to endocrinology. Pended and routing to provider for review.    Kortney Zarate RN  [facility]

## 2025-04-10 NOTE — TELEPHONE ENCOUNTER
Pt has appt scheduled with PCP to discuss lab results 4/14.     Ivette Bassett on 4/10/2025 at 8:33 AM

## 2025-04-10 NOTE — TELEPHONE ENCOUNTER
I am sorry I still don't see the reason for the referral. I will recommend patient see another provider maybe they can get a sense of what patient needs. I don't have a diagnosis to attach to the referral is my dilemma

## 2025-04-14 ENCOUNTER — VIRTUAL VISIT (OUTPATIENT)
Dept: FAMILY MEDICINE | Facility: CLINIC | Age: 35
End: 2025-04-14
Payer: COMMERCIAL

## 2025-04-14 DIAGNOSIS — N52.9 ERECTILE DYSFUNCTION, UNSPECIFIED ERECTILE DYSFUNCTION TYPE: ICD-10-CM

## 2025-04-14 DIAGNOSIS — R53.83 OTHER FATIGUE: Primary | ICD-10-CM

## 2025-04-14 PROCEDURE — 98006 SYNCH AUDIO-VIDEO EST MOD 30: CPT | Performed by: PHYSICIAN ASSISTANT

## 2025-04-14 NOTE — PROGRESS NOTES
"Carlos Manuel is a 35 year old who is being evaluated via a billable video visit.    How would you like to obtain your AVS? MyChart  If the video visit is dropped, the invitation should be resent by: Text to cell phone: [phone]  Will anyone else be joining your video visit? No      Assessment & Plan   Other fatigue  Erectile dysfunction, unspecified erectile dysfunction type  Patient presents for second opinion on his low testosterone.  He has been having fatigue, erectile dysfunction including maintaining and achieving erections.  He was worked up by a another primary care provider and was shown to have a testosterone level of 256.  This was done in the morning.  According to many endocrinology societies including the endocrinology Society of [place] low testosterone occurs at levels less than 300.  Despite our lab having a normal level around 240 it is common for patients to have symptoms of low testosterone when it is less than 300.  Given his current symptoms I do think it is reasonable to treat him with testosterone replacement as long as we repeat this test and ensure that it is below 300 again.  Patient will present to clinic for a lab appointment to recheck his testosterone level as well as a PSA which will be the only other test we need to start testosterone replacement.  I did discuss the risks of testosterone placement today including an increased risk of prostate cancer, DVT/PE.  Patient verbalized understanding but will discuss this again if his level is below 300 again.  - Testosterone Free and Total; Future  - PSA, screen; Future      BMI  Estimated body mass index is 28.8 kg/m  as calculated from the following:    Height as of 3/14/25: 1.753 m (5' 9\").    Weight as of 3/14/25: 88.5 kg (195 lb).     Subjective   Carlos Manuel is a 35 year old, presenting for the following health issues:  Results (Labwork 04/04/2025)        4/14/2025     1:25 PM   Additional Questions   Roomed by Lacey CALDERÓN   Accompanied by Self "     HPI          Review of Systems  See HPI       Objective           Vitals:  No vitals were obtained today due to virtual visit.    Physical Exam   GENERAL: alert and no distress  EYES: Eyes grossly normal to inspection.  No discharge or erythema, or obvious scleral/conjunctival abnormalities.  RESP: No audible wheeze, cough, or visible cyanosis.    SKIN: Visible skin clear. No significant rash, abnormal pigmentation or lesions.  NEURO: Cranial nerves grossly intact.  Mentation and speech appropriate for age.  PSYCH: Appropriate affect, tone, and pace of words        Video-Visit Details    Type of service:  Video Visit   Originating Location (pt. Location): Home    Distant Location (provider location):  On-site  Platform used for Video Visit: Abby  Signed Electronically by: Chauncey Conte PA-C

## 2025-04-15 ENCOUNTER — LAB (OUTPATIENT)
Dept: LAB | Facility: CLINIC | Age: 35
End: 2025-04-15
Payer: COMMERCIAL

## 2025-04-15 DIAGNOSIS — R53.83 OTHER FATIGUE: ICD-10-CM

## 2025-04-15 DIAGNOSIS — N52.9 ERECTILE DYSFUNCTION, UNSPECIFIED ERECTILE DYSFUNCTION TYPE: ICD-10-CM

## 2025-04-15 LAB
PSA SERPL DL<=0.01 NG/ML-MCNC: 0.54 NG/ML
SHBG SERPL-SCNC: 14 NMOL/L (ref 11–80)

## 2025-04-15 PROCEDURE — 84270 ASSAY OF SEX HORMONE GLOBUL: CPT

## 2025-04-15 PROCEDURE — G0103 PSA SCREENING: HCPCS

## 2025-04-15 PROCEDURE — 36415 COLL VENOUS BLD VENIPUNCTURE: CPT

## 2025-04-21 ENCOUNTER — MYC MEDICAL ADVICE (OUTPATIENT)
Dept: FAMILY MEDICINE | Facility: CLINIC | Age: 35
End: 2025-04-21
Payer: COMMERCIAL

## 2025-04-21 NOTE — TELEPHONE ENCOUNTER
Socrates Horowitz,     Testosterone levels are the same, and your PSA is within the normal range, so we can start testosterone replacement. Normally I recommend a topical form like Androgel first. Let me know if this is okay and what pharmacy you would like me to send this to.     Chauncey Conte PA-C    --Patient's lab results

## 2025-05-02 PROBLEM — M25.571 PAIN IN JOINT INVOLVING ANKLE AND FOOT, RIGHT: Status: ACTIVE | Noted: 2025-05-02

## 2025-05-02 PROBLEM — S99.911D INJURY OF RIGHT ANKLE, SUBSEQUENT ENCOUNTER: Status: ACTIVE | Noted: 2025-05-02

## 2025-05-20 ENCOUNTER — THERAPY VISIT (OUTPATIENT)
Dept: PHYSICAL THERAPY | Facility: CLINIC | Age: 35
End: 2025-05-20
Attending: PEDIATRICS
Payer: COMMERCIAL

## 2025-05-20 DIAGNOSIS — M25.571 PAIN IN JOINT INVOLVING ANKLE AND FOOT, RIGHT: Primary | ICD-10-CM

## 2025-05-20 DIAGNOSIS — S99.911D INJURY OF RIGHT ANKLE, SUBSEQUENT ENCOUNTER: ICD-10-CM

## 2025-05-20 PROCEDURE — 97140 MANUAL THERAPY 1/> REGIONS: CPT | Mod: GP | Performed by: PHYSICAL THERAPIST

## 2025-05-20 PROCEDURE — 97110 THERAPEUTIC EXERCISES: CPT | Mod: GP | Performed by: PHYSICAL THERAPIST

## 2025-06-03 ENCOUNTER — VIRTUAL VISIT (OUTPATIENT)
Dept: FAMILY MEDICINE | Facility: CLINIC | Age: 35
End: 2025-06-03
Payer: COMMERCIAL

## 2025-06-03 DIAGNOSIS — B96.89 ACUTE BACTERIAL SINUSITIS: Primary | ICD-10-CM

## 2025-06-03 DIAGNOSIS — J01.90 ACUTE BACTERIAL SINUSITIS: Primary | ICD-10-CM

## 2025-06-03 PROCEDURE — 98005 SYNCH AUDIO-VIDEO EST LOW 20: CPT | Performed by: PHYSICIAN ASSISTANT

## 2025-06-03 NOTE — PROGRESS NOTES
"Carlos Manuel is a 35 year old who is being evaluated via a billable video visit.          Assessment & Plan     Acute bacterial sinusitis  Treat with augmentin. Side effects and how to take the medication discussed.  Continue with symptomatic treatments with OTC medications also, rest and fluids.   - amoxicillin-clavulanate (AUGMENTIN) 875-125 MG tablet; Take 1 tablet by mouth 2 times daily.    Blood pressure elevated at his last in person appointment.   Follow up with primary provider for blood pressure check.       BMI  Estimated body mass index is 28.8 kg/m  as calculated from the following:    Height as of 3/14/25: 1.753 m (5' 9\").    Weight as of 3/14/25: 88.5 kg (195 lb).             Subjective   Carlos Manuel is a 35 year old, presenting for the following health issues:  Sinus Problem (Possible sinus infection)    History of Present Illness       Reason for visit:  Sinus infection  Symptom onset:  3-7 days ago  Symptoms include:  Sinus pressure head cheek gums  Symptom intensity:  Moderate  Symptom progression:  Staying the same  Had these symptoms before:  Yes  Has tried/received treatment for these symptoms:  Yes  Previous treatment was successful:  Yes  Prior treatment description:  Antibiotics   He is taking medications regularly.                Review of Systems  Constitutional, HEENT, cardiovascular, pulmonary, GI, , musculoskeletal, neuro, skin, endocrine and psych systems are negative, except as otherwise noted.      Objective           Vitals:  No vitals were obtained today due to virtual visit.    Physical Exam   GENERAL: alert and no distress  EYES: Eyes grossly normal to inspection.  No discharge or erythema, or obvious scleral/conjunctival abnormalities.  RESP: No audible wheeze, cough, or visible cyanosis.    SKIN: Visible skin clear. No significant rash, abnormal pigmentation or lesions.  NEURO: Cranial nerves grossly intact.  Mentation and speech appropriate for age.  PSYCH: Appropriate affect, tone, and " pace of words          Video-Visit Details    Type of service:  Video Visit   Originating Location (pt. Location): Home    Distant Location (provider location):  On-site  Platform used for Video Visit: Abby  Signed Electronically by: Kristen M. Kehr, PA-C

## 2025-06-14 ENCOUNTER — HEALTH MAINTENANCE LETTER (OUTPATIENT)
Age: 35
End: 2025-06-14

## 2025-06-25 ENCOUNTER — VIRTUAL VISIT (OUTPATIENT)
Dept: FAMILY MEDICINE | Facility: CLINIC | Age: 35
End: 2025-06-25
Payer: COMMERCIAL

## 2025-06-25 DIAGNOSIS — E29.1 HYPOGONADISM MALE: Primary | ICD-10-CM

## 2025-06-25 PROCEDURE — 98005 SYNCH AUDIO-VIDEO EST LOW 20: CPT | Performed by: PHYSICIAN ASSISTANT

## 2025-06-25 RX ORDER — TESTOSTERONE CYPIONATE 200 MG/ML
200 INJECTION, SOLUTION INTRAMUSCULAR
Status: ACTIVE | OUTPATIENT
Start: 2025-06-25 | End: 2025-09-17

## 2025-06-25 RX ORDER — TESTOSTERONE CYPIONATE 1000 MG/10ML
200 INJECTION, SOLUTION INTRAMUSCULAR
Qty: 2 ML | Refills: 5 | Status: SHIPPED | OUTPATIENT
Start: 2025-06-25

## 2025-06-25 NOTE — PROGRESS NOTES
"Carlos Manuel is a 35 year old who is being evaluated via a billable video visit.    How would you like to obtain your AVS? MyChart  If the video visit is dropped, the invitation should be resent by: Text to cell phone: [phone]  Will anyone else be joining your video visit? No      Assessment & Plan   Hypogonadism male  He has not noticed any change in symptoms or any feelings with starting topical testosterone.  He is also worried about the job getting on his children because they roughhouse with him and they are young.  We will transition to injectable 200 mg q. 14 days and he will get this done at the clinic at first by nursing staff and then likely transition to home.  Recommend follow-up in 2 to 3 months with a lab work after he is on stable dosing.  - Testosterone Free and Total; Future  - PRIMARY CARE FOLLOW-UP SCHEDULING; Future  - testosterone cypionate (DEPOTESTOSTERONE) 100 MG/ML injection; Inject 2 mLs (200 mg) into the muscle every 14 days.  - testosterone cypionate (DEPOTESTOSTERONE) injection 200 mg    The longitudinal plan of care for the diagnosis(es)/condition(s) as documented were addressed during this visit. Due to the added complexity in care, I will continue to support Carlos Manuel in the subsequent management and with ongoing continuity of care.     BMI  Estimated body mass index is 28.8 kg/m  as calculated from the following:    Height as of 3/14/25: 1.753 m (5' 9\").    Weight as of 3/14/25: 88.5 kg (195 lb).       Subjective   Carlos Manuel is a 35 year old, presenting for the following health issues:  Medication Follow-up        4/14/2025     1:25 PM   Additional Questions   Roomed by Lacey CALDERÓN   Accompanied by Self     HPI      Follow up/ med check - Testosterone   Not doing much for him.   Has not noticed any change in symptoms.  Concerned about getting gel on his children.       Review of Systems  See Hpi       Objective    Vitals - Patient Reported  Weight (Patient Reported): 86.6 kg (191 " lb)      Vitals:  No vitals were obtained today due to virtual visit.    Physical Exam   GENERAL: alert and no distress  EYES: Eyes grossly normal to inspection.  No discharge or erythema, or obvious scleral/conjunctival abnormalities.  RESP: No audible wheeze, cough, or visible cyanosis.    SKIN: Visible skin clear. No significant rash, abnormal pigmentation or lesions.  NEURO: Cranial nerves grossly intact.  Mentation and speech appropriate for age.  PSYCH: Appropriate affect, tone, and pace of words        Video-Visit Details    Type of service:  Video Visit   Originating Location (pt. Location): Home    Distant Location (provider location):  On-site  Platform used for Video Visit: Abby  Signed Electronically by: Chauncey Conte PA-C

## 2025-06-25 NOTE — PATIENT INSTRUCTIONS
Transition to testosterone injections.  These are done every 14 days.  When you  your medicine to the pharmacy schedule a visit with our nursing staff at that same time so that they can show you how to administer the injection at home.

## 2025-07-18 ENCOUNTER — TELEPHONE (OUTPATIENT)
Dept: FAMILY MEDICINE | Facility: CLINIC | Age: 35
End: 2025-07-18
Payer: COMMERCIAL

## 2025-07-18 DIAGNOSIS — E29.1 HYPOGONADISM MALE: Primary | ICD-10-CM

## 2025-07-18 NOTE — TELEPHONE ENCOUNTER
Patient is calling to see testosterone comes in a single dose vial instead of 10 ml vial as this medication is only good for 28 days and this would be a large amount of waste.     Current dose of testosterone is 200 mg IM Q 14 days.    Writer spoke with [facility] who states they are only able to get the 10 ml vials at their pharmacy. Spoke with [facility] who state they do have it in stock.     Last testosterone level was done 4/15/25 was 7.38.    Patient notified of the above and is requesting to have RX sent to our pharmacy for the individual vials.     RX pended and message routed to provider to review upon his return on 7/22/25, patient states his next dose is due 7/25/25.    Julie Behrendt RN  '

## 2025-07-21 RX ORDER — TESTOSTERONE CYPIONATE 200 MG/ML
200 INJECTION, SOLUTION INTRAMUSCULAR
Qty: 2 ML | Refills: 5 | Status: SHIPPED | OUTPATIENT
Start: 2025-07-21

## 2025-07-31 ENCOUNTER — LAB (OUTPATIENT)
Dept: LAB | Facility: CLINIC | Age: 35
End: 2025-07-31
Payer: COMMERCIAL

## 2025-07-31 DIAGNOSIS — E29.1 HYPOGONADISM MALE: ICD-10-CM

## 2025-07-31 LAB — SHBG SERPL-SCNC: 14 NMOL/L (ref 11–80)
